# Patient Record
Sex: FEMALE | Race: WHITE | NOT HISPANIC OR LATINO | Employment: UNEMPLOYED | URBAN - METROPOLITAN AREA
[De-identification: names, ages, dates, MRNs, and addresses within clinical notes are randomized per-mention and may not be internally consistent; named-entity substitution may affect disease eponyms.]

---

## 2023-11-01 PROBLEM — G40.909 SEIZURE DISORDER (MULTI): Status: ACTIVE | Noted: 2023-11-01

## 2023-11-01 PROBLEM — R73.9 HYPERGLYCEMIA: Status: ACTIVE | Noted: 2023-11-01

## 2023-11-01 PROBLEM — E66.9 OBESITY: Status: ACTIVE | Noted: 2023-11-01

## 2023-11-01 PROBLEM — L23.9 ALLERGIC CONTACT DERMATITIS: Status: ACTIVE | Noted: 2023-11-01

## 2023-11-01 PROBLEM — E55.9 VITAMIN D DEFICIENCY: Status: ACTIVE | Noted: 2023-11-01

## 2023-11-01 PROBLEM — E03.9 HYPOTHYROIDISM: Status: ACTIVE | Noted: 2023-11-01

## 2023-11-01 PROBLEM — K21.9 ACID REFLUX: Status: ACTIVE | Noted: 2023-11-01

## 2023-11-01 PROBLEM — G43.909 MIGRAINE, UNSPECIFIED, NOT INTRACTABLE, WITHOUT STATUS MIGRAINOSUS: Status: ACTIVE | Noted: 2023-11-01

## 2023-11-01 PROBLEM — E78.5 HYPERLIPIDEMIA: Status: ACTIVE | Noted: 2023-11-01

## 2023-11-01 PROBLEM — R20.2 PARESTHESIAS: Status: ACTIVE | Noted: 2023-11-01

## 2023-11-01 PROBLEM — R53.83 FATIGUE: Status: ACTIVE | Noted: 2023-11-01

## 2023-11-01 PROBLEM — G62.9 PERIPHERAL NEUROPATHY: Status: ACTIVE | Noted: 2023-11-01

## 2023-11-01 PROBLEM — F39 MOOD DISORDER (CMS-HCC): Status: ACTIVE | Noted: 2023-11-01

## 2023-11-01 RX ORDER — METHYLPREDNISOLONE 4 MG/1
TABLET ORAL
COMMUNITY
Start: 2022-06-30 | End: 2024-01-30 | Stop reason: WASHOUT

## 2023-11-01 RX ORDER — PANTOPRAZOLE SODIUM 40 MG/1
40 TABLET, DELAYED RELEASE ORAL DAILY
COMMUNITY
End: 2024-04-11

## 2023-11-01 RX ORDER — SERTRALINE HYDROCHLORIDE 50 MG/1
50 TABLET, FILM COATED ORAL DAILY
COMMUNITY
Start: 2023-04-12

## 2023-11-01 RX ORDER — ATORVASTATIN CALCIUM 20 MG/1
TABLET, FILM COATED ORAL
COMMUNITY
Start: 2023-07-28 | End: 2024-01-30 | Stop reason: WASHOUT

## 2023-11-01 RX ORDER — LEVOTHYROXINE SODIUM 175 UG/1
1 TABLET ORAL DAILY
COMMUNITY
Start: 2021-11-16 | End: 2024-03-01

## 2023-11-01 RX ORDER — TRIAMCINOLONE ACETONIDE 1 MG/G
CREAM TOPICAL
COMMUNITY
Start: 2022-06-13 | End: 2024-01-30 | Stop reason: WASHOUT

## 2023-11-01 RX ORDER — LAMOTRIGINE 200 MG/1
1 TABLET ORAL 2 TIMES DAILY
Status: ON HOLD | COMMUNITY
Start: 2023-10-22 | End: 2024-04-25

## 2023-11-01 RX ORDER — ALBUTEROL SULFATE 90 UG/1
AEROSOL, METERED RESPIRATORY (INHALATION)
COMMUNITY
Start: 2023-01-15 | End: 2024-06-10 | Stop reason: HOSPADM

## 2023-11-01 RX ORDER — VALACYCLOVIR HYDROCHLORIDE 1 G/1
1000 TABLET, FILM COATED ORAL DAILY
COMMUNITY
End: 2024-01-30 | Stop reason: WASHOUT

## 2023-11-01 RX ORDER — ERGOCALCIFEROL 1.25 MG/1
1 CAPSULE ORAL
Status: ON HOLD | COMMUNITY
Start: 2022-04-19 | End: 2024-04-24 | Stop reason: ALTCHOICE

## 2023-11-01 RX ORDER — FLUOXETINE HYDROCHLORIDE 20 MG/1
1 CAPSULE ORAL DAILY
COMMUNITY
Start: 2022-01-31 | End: 2024-01-30 | Stop reason: WASHOUT

## 2023-11-01 RX ORDER — ATORVASTATIN CALCIUM 10 MG/1
1 TABLET, FILM COATED ORAL DAILY
COMMUNITY
Start: 2022-01-25 | End: 2024-03-25 | Stop reason: WASHOUT

## 2023-11-01 RX ORDER — FLUTICASONE PROPIONATE AND SALMETEROL 50; 250 UG/1; UG/1
1 POWDER RESPIRATORY (INHALATION) 2 TIMES DAILY
COMMUNITY
Start: 2023-02-11 | End: 2024-01-30 | Stop reason: WASHOUT

## 2023-11-01 RX ORDER — CEFUROXIME AXETIL 250 MG/1
250 TABLET ORAL EVERY 12 HOURS
COMMUNITY
Start: 2023-05-24 | End: 2024-01-30 | Stop reason: WASHOUT

## 2024-01-02 ENCOUNTER — DOCUMENTATION (OUTPATIENT)
Dept: PRIMARY CARE | Facility: CLINIC | Age: 59
End: 2024-01-02
Payer: COMMERCIAL

## 2024-01-03 ENCOUNTER — PATIENT OUTREACH (OUTPATIENT)
Dept: PRIMARY CARE | Facility: CLINIC | Age: 59
End: 2024-01-03
Payer: COMMERCIAL

## 2024-01-03 NOTE — PROGRESS NOTES
Discharge Facility:  [place]  Discharge Diagnosis: Community acquired pneumonia of left lower lobe of lung   Admission Date: 12/26/2023  Discharge Date: 12/29/2023    PCP Appointment Date: none- task office  Specialist Appointment Date: neurology 1/30/2024  Hospital Encounter and Summary: Linked       START taking these medications     levETIRAcetam 500 mg tablet  Commonly known as: KEPPRA  Take 1 tablet by mouth two times a day.     Unsuccessful attempts x 2 to reach patient for PCP follow up.

## 2024-01-12 ENCOUNTER — PATIENT OUTREACH (OUTPATIENT)
Dept: PRIMARY CARE | Facility: CLINIC | Age: 59
End: 2024-01-12
Payer: COMMERCIAL

## 2024-01-30 ENCOUNTER — OFFICE VISIT (OUTPATIENT)
Dept: NEUROLOGY | Facility: CLINIC | Age: 59
End: 2024-01-30
Payer: MEDICARE

## 2024-01-30 VITALS
DIASTOLIC BLOOD PRESSURE: 70 MMHG | HEART RATE: 66 BPM | BODY MASS INDEX: 35.56 KG/M2 | SYSTOLIC BLOOD PRESSURE: 112 MMHG | WEIGHT: 200.7 LBS | TEMPERATURE: 97.3 F | HEIGHT: 63 IN

## 2024-01-30 DIAGNOSIS — G40.109 TEMPORAL LOBE EPILEPSY (MULTI): Primary | ICD-10-CM

## 2024-01-30 PROCEDURE — 1036F TOBACCO NON-USER: CPT | Performed by: PSYCHIATRY & NEUROLOGY

## 2024-01-30 PROCEDURE — 99214 OFFICE O/P EST MOD 30 MIN: CPT | Performed by: PSYCHIATRY & NEUROLOGY

## 2024-01-30 RX ORDER — LEVETIRACETAM 500 MG/1
500 TABLET ORAL 2 TIMES DAILY
COMMUNITY
Start: 2023-12-29 | End: 2024-03-25 | Stop reason: SINTOL

## 2024-01-30 ASSESSMENT — PATIENT HEALTH QUESTIONNAIRE - PHQ9
2. FEELING DOWN, DEPRESSED OR HOPELESS: NEARLY EVERY DAY
1. LITTLE INTEREST OR PLEASURE IN DOING THINGS: NOT AT ALL
SUM OF ALL RESPONSES TO PHQ9 QUESTIONS 1 & 2: 3
10. IF YOU CHECKED OFF ANY PROBLEMS, HOW DIFFICULT HAVE THESE PROBLEMS MADE IT FOR YOU TO DO YOUR WORK, TAKE CARE OF THINGS AT HOME, OR GET ALONG WITH OTHER PEOPLE: VERY DIFFICULT

## 2024-01-30 ASSESSMENT — LIFESTYLE VARIABLES
HOW OFTEN DO YOU HAVE A DRINK CONTAINING ALCOHOL: NEVER
HOW MANY STANDARD DRINKS CONTAINING ALCOHOL DO YOU HAVE ON A TYPICAL DAY: PATIENT DOES NOT DRINK

## 2024-01-30 NOTE — PROGRESS NOTES
Chief Complaint: Epilepsy    HPI  58 y.o. female presenting for follow up regarding epilepsy.    Since the last visit, she developed focal seizures again.  Around Kali time, she developed episodes of staring off and has concurrent hand automatisms.  It lasts a couple of minutes.  Following the episodes, she is irritable and confused.  Prior to Kali time, her  reports that she was having staring spells once in a while.  She was taken to [facility].  Apparently, she had a continuous EEG overnight from1 2/28 to 12/29.  Keppra was added.  She is currently on Keppra 500 mg BID and Lamotrigine 200 mg BID.  Her  feels like the episodes have lessened.  She remains aphasic.  She also is very tearful.              Current Outpatient Medications:     albuterol 90 mcg/actuation inhaler, INHALE 1 PUFF EVERY 4 HOURS AS NEEDED FOR COUGH/WHEEZE, Disp: , Rfl:     atorvastatin (Lipitor) 10 mg tablet, Take 1 tablet (10 mg) by mouth once daily., Disp: , Rfl:     atorvastatin (Lipitor) 20 mg tablet, TAKE 1 TABLET DAILY, Disp: 90 tablet, Rfl: 3    ergocalciferol (Vitamin D-2) 1.25 MG (57822 UT) capsule, Take 1 capsule (1,250 mcg) by mouth 1 (one) time per week., Disp: , Rfl:     lamoTRIgine (LaMICtal) 200 mg tablet, Take 1 tablet (200 mg) by mouth 2 times a day., Disp: , Rfl:     levETIRAcetam (Keppra) 500 mg tablet, Take 1 tablet (500 mg) by mouth twice a day., Disp: , Rfl:     levothyroxine (Synthroid, Levoxyl) 175 mcg tablet, Take 1 tablet (175 mcg) by mouth once daily., Disp: , Rfl:     pantoprazole (ProtoNix) 40 mg EC tablet, Take 1 tablet (40 mg) by mouth once daily., Disp: , Rfl:     sertraline (Zoloft) 50 mg tablet, Take 1 tablet (50 mg) by mouth once daily., Disp: , Rfl:       Objective:  Gen: NAD  Neuro:  --HIF: Awake, alert, tearful; able to say yes/no answers, unable to name or repeat objects  --CN:  PERRLA, EOM grossly intact, VFF intact to threat, no visible facial asymmetry, facial sensation  intact, no tongue or palatal deviation, SCM intact  --Motor: Moves all 4 extremities equally; no focal deficits  --Sensory: Intact to light touch  --Reflex: 2+ symmetric, toes down  --Cerebellum: Unable to follow commans  --Gait: Normal, narrow based gait    Relevant Results  EEG  Moderate encephalopathy noted    Assessment:   This is a 58 year old female with temporal lobe epilepsy s/p epilepsy surgery presenting for follow up.  She receives her care at [facility].  Since the last visit, her family has noted increasing focal seizures.  She has events where she stares off and has automatisms in her hands (she will start rubbing her thighs) - lasting a few minutes.  She was seen at [facility] - Keppra was added.    Her case is rather complex.  Six months following her epilepsy surgery, she developed aphasia.  She had a workup at [facility] and it was thought that her aphasia may be functional (due to depression).      Her family is here today asking for a second opinion.    Plan:  - recommend continuing Keppra 500 mg BID and Lamotrigine 200 mg BID  - recommend second opinion with Epilepsy subspecialist at   - consider EMU monitoring    Kwaku Cleaning MD  [facility]  Department of Neurology

## 2024-02-05 ENCOUNTER — APPOINTMENT (OUTPATIENT)
Dept: PSYCHOLOGY | Facility: CLINIC | Age: 59
End: 2024-02-05
Payer: COMMERCIAL

## 2024-02-09 ENCOUNTER — PATIENT OUTREACH (OUTPATIENT)
Dept: PRIMARY CARE | Facility: CLINIC | Age: 59
End: 2024-02-09
Payer: MEDICARE

## 2024-03-01 DIAGNOSIS — E03.9 HYPOTHYROIDISM, UNSPECIFIED TYPE: Primary | ICD-10-CM

## 2024-03-01 RX ORDER — LEVOTHYROXINE SODIUM 175 UG/1
175 TABLET ORAL DAILY
Qty: 90 TABLET | Refills: 0 | Status: SHIPPED | OUTPATIENT
Start: 2024-03-01 | End: 2024-03-25 | Stop reason: SDUPTHER

## 2024-03-22 PROBLEM — R56.9 SEIZURE (MULTI): Status: ACTIVE | Noted: 2021-04-27

## 2024-03-22 PROBLEM — G40.219: Status: ACTIVE | Noted: 2020-10-02

## 2024-03-22 PROBLEM — B00.1 HERPES LABIALIS: Status: ACTIVE | Noted: 2024-03-22

## 2024-03-22 PROBLEM — G40.109 TEMPORAL LOBE EPILEPSY (MULTI): Status: ACTIVE | Noted: 2019-03-28

## 2024-03-22 PROBLEM — G47.30 SLEEP APNEA: Status: ACTIVE | Noted: 2021-04-30

## 2024-03-22 PROBLEM — R41.82 MENTAL STATUS, DECREASED: Status: ACTIVE | Noted: 2023-12-28

## 2024-03-22 PROBLEM — F33.9 RECURRENT MAJOR DEPRESSION (CMS-HCC): Status: ACTIVE | Noted: 2020-10-05

## 2024-03-22 PROBLEM — R41.3 MEMORY IMPAIRMENT: Status: ACTIVE | Noted: 2024-03-22

## 2024-03-22 PROBLEM — G40.119: Status: ACTIVE | Noted: 2021-09-20

## 2024-03-22 PROBLEM — R47.01 APHASIA: Status: ACTIVE | Noted: 2020-11-03

## 2024-03-22 PROBLEM — J45.909 ASTHMA (HHS-HCC): Status: ACTIVE | Noted: 2024-03-22

## 2024-03-22 PROBLEM — G40.919 BREAKTHROUGH SEIZURE (MULTI): Status: ACTIVE | Noted: 2022-03-01

## 2024-03-22 PROBLEM — R40.4 STARING EPISODES: Status: ACTIVE | Noted: 2023-12-27

## 2024-03-22 PROBLEM — G40.209 PARTIAL EPILEPSY WITH IMPAIRMENT OF CONSCIOUSNESS (MULTI): Status: ACTIVE | Noted: 2020-10-02

## 2024-03-22 PROBLEM — G40.909 EPILEPSY (MULTI): Status: ACTIVE | Noted: 2022-03-18

## 2024-03-22 PROBLEM — R21 RASH: Status: ACTIVE | Noted: 2024-03-22

## 2024-03-22 PROBLEM — J06.9 ACUTE UPPER RESPIRATORY INFECTION: Status: ACTIVE | Noted: 2024-03-22

## 2024-03-22 PROBLEM — R79.89: Status: ACTIVE | Noted: 2022-03-11

## 2024-03-22 PROBLEM — J18.9 COMMUNITY ACQUIRED PNEUMONIA OF LEFT LOWER LOBE OF LUNG: Status: ACTIVE | Noted: 2023-12-27

## 2024-03-22 PROBLEM — E78.49 OTHER HYPERLIPIDEMIA: Status: ACTIVE | Noted: 2020-10-05

## 2024-03-22 PROBLEM — F17.200 NICOTINE DEPENDENCE: Status: ACTIVE | Noted: 2020-09-05

## 2024-03-22 PROBLEM — F32.A DEPRESSIVE DISORDER: Status: ACTIVE | Noted: 2022-03-11

## 2024-03-22 PROBLEM — R26.89 BALANCE PROBLEMS: Status: ACTIVE | Noted: 2022-03-23

## 2024-03-22 PROBLEM — M79.602 PAIN OF LEFT UPPER EXTREMITY: Status: ACTIVE | Noted: 2022-02-14

## 2024-03-22 PROBLEM — E66.01 SEVERE OBESITY (MULTI): Status: ACTIVE | Noted: 2020-09-05

## 2024-03-22 PROBLEM — G93.41 METABOLIC ENCEPHALOPATHY: Status: ACTIVE | Noted: 2022-03-11

## 2024-03-25 ENCOUNTER — OFFICE VISIT (OUTPATIENT)
Dept: PRIMARY CARE | Facility: CLINIC | Age: 59
End: 2024-03-25
Payer: MEDICARE

## 2024-03-25 VITALS
DIASTOLIC BLOOD PRESSURE: 72 MMHG | OXYGEN SATURATION: 95 % | HEIGHT: 63 IN | BODY MASS INDEX: 35.26 KG/M2 | HEART RATE: 76 BPM | WEIGHT: 199 LBS | TEMPERATURE: 96.5 F | SYSTOLIC BLOOD PRESSURE: 114 MMHG

## 2024-03-25 DIAGNOSIS — R21 RASH: ICD-10-CM

## 2024-03-25 DIAGNOSIS — E03.9 HYPOTHYROIDISM, UNSPECIFIED TYPE: ICD-10-CM

## 2024-03-25 DIAGNOSIS — E78.5 HYPERLIPIDEMIA, UNSPECIFIED HYPERLIPIDEMIA TYPE: Primary | ICD-10-CM

## 2024-03-25 PROCEDURE — 99214 OFFICE O/P EST MOD 30 MIN: CPT | Performed by: FAMILY MEDICINE

## 2024-03-25 PROCEDURE — 1036F TOBACCO NON-USER: CPT | Performed by: FAMILY MEDICINE

## 2024-03-25 RX ORDER — LEVOTHYROXINE SODIUM 175 UG/1
175 TABLET ORAL DAILY
Qty: 90 TABLET | Refills: 3 | Status: SHIPPED | OUTPATIENT
Start: 2024-03-25 | End: 2024-06-10 | Stop reason: HOSPADM

## 2024-03-25 RX ORDER — TRIAMCINOLONE ACETONIDE 1 MG/G
CREAM TOPICAL 2 TIMES DAILY
Qty: 30 G | Refills: 0 | Status: SHIPPED | OUTPATIENT
Start: 2024-03-25 | End: 2024-04-11

## 2024-03-25 RX ORDER — ATORVASTATIN CALCIUM 20 MG/1
20 TABLET, FILM COATED ORAL DAILY
Qty: 90 TABLET | Refills: 3 | Status: SHIPPED | OUTPATIENT
Start: 2024-03-25

## 2024-03-25 ASSESSMENT — PATIENT HEALTH QUESTIONNAIRE - PHQ9
1. LITTLE INTEREST OR PLEASURE IN DOING THINGS: NOT AT ALL
2. FEELING DOWN, DEPRESSED OR HOPELESS: NOT AT ALL
SUM OF ALL RESPONSES TO PHQ9 QUESTIONS 1 AND 2: 0

## 2024-03-25 NOTE — PROGRESS NOTES
Fuv   Med refills   Eval medication    HPI patient with a history of hyperlipidemia hypothyroidism questing refills of medication.  She is scheduled to see an epilepsy subspecialist next month.  She remains on Lamictal.  She was given Keppra as well but discontinued the Keppra due to side effects.   notes she became very angry.  Patient will need refills for her Lipitor and Synthroid.  She notes she has a rash on the back notes it is itchy.  Denies fever chills chest pain.  Past medical social surgical history is reviewed    Exam calm psych pleasant female no psychosis.  Skin skin exam reveals a very small pinpoint rash on the low back area in the waistline no evidence of infection no blisters or pustules noted    A/P 1.  Hyperlipidemia stable 2 hypothyroidism stable 3 rash.  Triamcinolone cream ordered.  Medicines are refilled.  She will follow-up with neurology as previously arranged.   notes she also has an appointment with psychiatry at the [facility].

## 2024-04-23 ENCOUNTER — APPOINTMENT (OUTPATIENT)
Dept: RADIOLOGY | Facility: HOSPITAL | Age: 59
DRG: 177 | End: 2024-04-23
Payer: MEDICARE

## 2024-04-23 ENCOUNTER — HOSPITAL ENCOUNTER (INPATIENT)
Facility: HOSPITAL | Age: 59
LOS: 2 days | Discharge: HOME | DRG: 177 | End: 2024-04-25
Attending: STUDENT IN AN ORGANIZED HEALTH CARE EDUCATION/TRAINING PROGRAM | Admitting: STUDENT IN AN ORGANIZED HEALTH CARE EDUCATION/TRAINING PROGRAM
Payer: MEDICARE

## 2024-04-23 ENCOUNTER — APPOINTMENT (OUTPATIENT)
Dept: CARDIOLOGY | Facility: HOSPITAL | Age: 59
DRG: 177 | End: 2024-04-23
Payer: MEDICARE

## 2024-04-23 DIAGNOSIS — J18.9 PNEUMONIA OF RIGHT LUNG DUE TO INFECTIOUS ORGANISM, UNSPECIFIED PART OF LUNG: ICD-10-CM

## 2024-04-23 DIAGNOSIS — A49.9 BACTERIAL UTI: ICD-10-CM

## 2024-04-23 DIAGNOSIS — R41.82 ALTERED MENTAL STATUS, UNSPECIFIED ALTERED MENTAL STATUS TYPE: Primary | ICD-10-CM

## 2024-04-23 DIAGNOSIS — J18.9 COMMUNITY ACQUIRED PNEUMONIA OF LEFT LOWER LOBE OF LUNG: ICD-10-CM

## 2024-04-23 DIAGNOSIS — R56.9 SEIZURE (MULTI): ICD-10-CM

## 2024-04-23 DIAGNOSIS — E66.9 OBESITY, UNSPECIFIED: ICD-10-CM

## 2024-04-23 DIAGNOSIS — G40.109 TEMPORAL LOBE EPILEPSY (MULTI): ICD-10-CM

## 2024-04-23 DIAGNOSIS — N39.0 BACTERIAL UTI: ICD-10-CM

## 2024-04-23 LAB
ALBUMIN SERPL BCP-MCNC: 4.5 G/DL (ref 3.4–5)
ALP SERPL-CCNC: 78 U/L (ref 33–110)
ALT SERPL W P-5'-P-CCNC: 7 U/L (ref 7–45)
ANION GAP SERPL CALC-SCNC: 15 MMOL/L (ref 10–20)
APPEARANCE UR: ABNORMAL
AST SERPL W P-5'-P-CCNC: 17 U/L (ref 9–39)
BACTERIA #/AREA URNS AUTO: ABNORMAL /HPF
BASOPHILS # BLD AUTO: 0.05 X10*3/UL (ref 0–0.1)
BASOPHILS NFR BLD AUTO: 0.3 %
BILIRUB SERPL-MCNC: 0.6 MG/DL (ref 0–1.2)
BILIRUB UR STRIP.AUTO-MCNC: NEGATIVE MG/DL
BUN SERPL-MCNC: 16 MG/DL (ref 6–23)
CALCIUM SERPL-MCNC: 9.8 MG/DL (ref 8.6–10.3)
CAOX CRY #/AREA UR COMP ASSIST: ABNORMAL /HPF
CHLORIDE SERPL-SCNC: 104 MMOL/L (ref 98–107)
CO2 SERPL-SCNC: 24 MMOL/L (ref 21–32)
COLOR UR: YELLOW
CREAT SERPL-MCNC: 1.06 MG/DL (ref 0.5–1.05)
EGFRCR SERPLBLD CKD-EPI 2021: 61 ML/MIN/1.73M*2
EOSINOPHIL # BLD AUTO: 0.06 X10*3/UL (ref 0–0.7)
EOSINOPHIL NFR BLD AUTO: 0.4 %
ERYTHROCYTE [DISTWIDTH] IN BLOOD BY AUTOMATED COUNT: 13.5 % (ref 11.5–14.5)
FLUAV RNA RESP QL NAA+PROBE: NOT DETECTED
FLUBV RNA RESP QL NAA+PROBE: NOT DETECTED
GLUCOSE SERPL-MCNC: 100 MG/DL (ref 74–99)
GLUCOSE UR STRIP.AUTO-MCNC: NEGATIVE MG/DL
HCT VFR BLD AUTO: 41.9 % (ref 36–46)
HGB BLD-MCNC: 13.6 G/DL (ref 12–16)
IMM GRANULOCYTES # BLD AUTO: 0.04 X10*3/UL (ref 0–0.7)
IMM GRANULOCYTES NFR BLD AUTO: 0.3 % (ref 0–0.9)
KETONES UR STRIP.AUTO-MCNC: NEGATIVE MG/DL
LEUKOCYTE ESTERASE UR QL STRIP.AUTO: ABNORMAL
LYMPHOCYTES # BLD AUTO: 1.46 X10*3/UL (ref 1.2–4.8)
LYMPHOCYTES NFR BLD AUTO: 9.7 %
MAGNESIUM SERPL-MCNC: 2.11 MG/DL (ref 1.6–2.4)
MCH RBC QN AUTO: 27.7 PG (ref 26–34)
MCHC RBC AUTO-ENTMCNC: 32.5 G/DL (ref 32–36)
MCV RBC AUTO: 85 FL (ref 80–100)
MONOCYTES # BLD AUTO: 0.61 X10*3/UL (ref 0.1–1)
MONOCYTES NFR BLD AUTO: 4 %
MUCOUS THREADS #/AREA URNS AUTO: ABNORMAL /LPF
NEUTROPHILS # BLD AUTO: 12.85 X10*3/UL (ref 1.2–7.7)
NEUTROPHILS NFR BLD AUTO: 85.3 %
NITRITE UR QL STRIP.AUTO: NEGATIVE
NRBC BLD-RTO: 0 /100 WBCS (ref 0–0)
PH UR STRIP.AUTO: 6 [PH]
PLATELET # BLD AUTO: 204 X10*3/UL (ref 150–450)
POTASSIUM SERPL-SCNC: 4.3 MMOL/L (ref 3.5–5.3)
PROT SERPL-MCNC: 7.5 G/DL (ref 6.4–8.2)
PROT UR STRIP.AUTO-MCNC: ABNORMAL MG/DL
RBC # BLD AUTO: 4.91 X10*6/UL (ref 4–5.2)
RBC # UR STRIP.AUTO: ABNORMAL /UL
RBC #/AREA URNS AUTO: ABNORMAL /HPF
SARS-COV-2 RNA RESP QL NAA+PROBE: NOT DETECTED
SODIUM SERPL-SCNC: 139 MMOL/L (ref 136–145)
SP GR UR STRIP.AUTO: 1.03
SQUAMOUS #/AREA URNS AUTO: ABNORMAL /HPF
UROBILINOGEN UR STRIP.AUTO-MCNC: 4 MG/DL
WBC # BLD AUTO: 15.1 X10*3/UL (ref 4.4–11.3)
WBC #/AREA URNS AUTO: ABNORMAL /HPF

## 2024-04-23 PROCEDURE — 87086 URINE CULTURE/COLONY COUNT: CPT | Mod: PARLAB | Performed by: NURSE PRACTITIONER

## 2024-04-23 PROCEDURE — 96366 THER/PROPH/DIAG IV INF ADDON: CPT

## 2024-04-23 PROCEDURE — G0378 HOSPITAL OBSERVATION PER HR: HCPCS

## 2024-04-23 PROCEDURE — 1210000001 HC SEMI-PRIVATE ROOM DAILY

## 2024-04-23 PROCEDURE — 93005 ELECTROCARDIOGRAM TRACING: CPT

## 2024-04-23 PROCEDURE — 36415 COLL VENOUS BLD VENIPUNCTURE: CPT | Performed by: NURSE PRACTITIONER

## 2024-04-23 PROCEDURE — 83735 ASSAY OF MAGNESIUM: CPT | Performed by: NURSE PRACTITIONER

## 2024-04-23 PROCEDURE — 87636 SARSCOV2 & INF A&B AMP PRB: CPT | Performed by: NURSE PRACTITIONER

## 2024-04-23 PROCEDURE — 71046 X-RAY EXAM CHEST 2 VIEWS: CPT | Performed by: RADIOLOGY

## 2024-04-23 PROCEDURE — 2500000004 HC RX 250 GENERAL PHARMACY W/ HCPCS (ALT 636 FOR OP/ED): Performed by: STUDENT IN AN ORGANIZED HEALTH CARE EDUCATION/TRAINING PROGRAM

## 2024-04-23 PROCEDURE — 85025 COMPLETE CBC W/AUTO DIFF WBC: CPT | Performed by: NURSE PRACTITIONER

## 2024-04-23 PROCEDURE — 80053 COMPREHEN METABOLIC PANEL: CPT | Performed by: NURSE PRACTITIONER

## 2024-04-23 PROCEDURE — 99285 EMERGENCY DEPT VISIT HI MDM: CPT | Mod: 25

## 2024-04-23 PROCEDURE — 70450 CT HEAD/BRAIN W/O DYE: CPT | Performed by: RADIOLOGY

## 2024-04-23 PROCEDURE — 96365 THER/PROPH/DIAG IV INF INIT: CPT

## 2024-04-23 PROCEDURE — 71046 X-RAY EXAM CHEST 2 VIEWS: CPT

## 2024-04-23 PROCEDURE — 81001 URINALYSIS AUTO W/SCOPE: CPT | Performed by: NURSE PRACTITIONER

## 2024-04-23 PROCEDURE — 70450 CT HEAD/BRAIN W/O DYE: CPT

## 2024-04-23 RX ORDER — LEVOFLOXACIN 5 MG/ML
750 INJECTION, SOLUTION INTRAVENOUS ONCE
Status: COMPLETED | OUTPATIENT
Start: 2024-04-23 | End: 2024-04-23

## 2024-04-23 RX ORDER — DOXYCYCLINE HYCLATE 50 MG/1
100 CAPSULE ORAL ONCE
Status: DISCONTINUED | OUTPATIENT
Start: 2024-04-23 | End: 2024-04-23

## 2024-04-23 RX ADMIN — LEVOFLOXACIN 750 MG: 750 INJECTION, SOLUTION INTRAVENOUS at 21:44

## 2024-04-23 ASSESSMENT — COLUMBIA-SUICIDE SEVERITY RATING SCALE - C-SSRS
2. HAVE YOU ACTUALLY HAD ANY THOUGHTS OF KILLING YOURSELF?: NO
6. HAVE YOU EVER DONE ANYTHING, STARTED TO DO ANYTHING, OR PREPARED TO DO ANYTHING TO END YOUR LIFE?: NO
6. HAVE YOU EVER DONE ANYTHING, STARTED TO DO ANYTHING, OR PREPARED TO DO ANYTHING TO END YOUR LIFE?: NO
1. IN THE PAST MONTH, HAVE YOU WISHED YOU WERE DEAD OR WISHED YOU COULD GO TO SLEEP AND NOT WAKE UP?: NO

## 2024-04-23 ASSESSMENT — LIFESTYLE VARIABLES
HAVE YOU EVER FELT YOU SHOULD CUT DOWN ON YOUR DRINKING: NO
EVER HAD A DRINK FIRST THING IN THE MORNING TO STEADY YOUR NERVES TO GET RID OF A HANGOVER: NO
HAVE PEOPLE ANNOYED YOU BY CRITICIZING YOUR DRINKING: NO
TOTAL SCORE: 0
EVER FELT BAD OR GUILTY ABOUT YOUR DRINKING: NO

## 2024-04-23 ASSESSMENT — PAIN SCALES - GENERAL
PAINLEVEL_OUTOF10: 0 - NO PAIN
PAINLEVEL_OUTOF10: 0 - NO PAIN

## 2024-04-23 ASSESSMENT — PAIN - FUNCTIONAL ASSESSMENT: PAIN_FUNCTIONAL_ASSESSMENT: 0-10

## 2024-04-23 NOTE — ED TRIAGE NOTES
" TRIAGE NOTE   I saw the patient as the Clinician in Triage and performed a brief history and physical exam, established acuity, and ordered appropriate tests to develop basic plan of care. Patient will be seen by an [facility]ENNI, resident and/or physician who will independently evaluate the patient. Please see subsequent provider notes for further details and disposition.     Brief HPI: In brief, Elana Cox is a 58 y.o. female with significant past medical history for hypothyroidism, COPD and epilepsy presenting to ED today from home with  for evaluation of mental status change.  Patient is unable to answer any questions, she is able to state her name but she cannot answer anything else.  Follows few simple commands.   states in \"2021 the patient had brain surgery for focal seizures.\"   feels the patient has had decreased mental status for years since the surgery.  The patient is unable to follow directions, she hallucinates and is more confused.  Friend who is anesthesiologist dated \"the [facility] is not doing anything for her, take her somewhere else.\"  Reports cough over the last couple days.   wants another opinion.   denies fever/chills, sore throat, chest pain, shortness of breath, nausea/vomiting, abdominal pain, urinary symptoms, change in bowel habits or any other complaints.  No smoking, EtOH or drug use.  PCP is Dr. Olivares.  Neurologist at [facility].    Focused Physical exam:   General: Older  female, sitting quietly in exam room, responds to name and follows a few simple commands.  Awake and alert.  Well-nourished and hydrated.  Skin: Pink warm and dry.  Cardiac: Regular rate and rhythm.  Pulmonary, lungs clear bilaterally.  Abdomen: Soft and rounded.  Bowel sounds x 4.  Extremities: Movement of extremities x 4.  MSPs intact.  Skin intact.  No deformities.  Neuro: Awake and alert, intermittently mumbling, responds to name.  Moves extremities bilaterally and " symmetrically.    Plan/MDM:   58-year-old female with history of hypothyroidism, COPD and epilepsy is evaluated at the bedside in the ER for altered mental status.   states she has had altered mental status since brain surgery 3 years ago, she hallucinates at times she cannot follow directions and she is more confused.  Does report a mild cough.  A friend encouraged him to come for second opinion as he does not feel the bleeding clinic is doing anything for her.  On arrival to the ED, vital signs within normal limits.  Afebrile.  Awake and alert, responding to name and follows a few simple commands.  Basic metabolic workup will be obtained including labs, UA/urine culture, chest x-ray, viral swabs, EKG and head CT in preparation for further evaluation in the main ED.  Please see subsequent provider note for further details and disposition

## 2024-04-24 LAB
ALBUMIN SERPL BCP-MCNC: 4.2 G/DL (ref 3.4–5)
ANION GAP SERPL CALC-SCNC: 14 MMOL/L (ref 10–20)
ATRIAL RATE: 72 BPM
BACTERIA UR CULT: NORMAL
BUN SERPL-MCNC: 11 MG/DL (ref 6–23)
CALCIUM SERPL-MCNC: 9.2 MG/DL (ref 8.6–10.3)
CHLORIDE SERPL-SCNC: 105 MMOL/L (ref 98–107)
CO2 SERPL-SCNC: 23 MMOL/L (ref 21–32)
CREAT SERPL-MCNC: 0.82 MG/DL (ref 0.5–1.05)
EGFRCR SERPLBLD CKD-EPI 2021: 83 ML/MIN/1.73M*2
ERYTHROCYTE [DISTWIDTH] IN BLOOD BY AUTOMATED COUNT: 13.5 % (ref 11.5–14.5)
GLUCOSE SERPL-MCNC: 95 MG/DL (ref 74–99)
HCT VFR BLD AUTO: 38 % (ref 36–46)
HGB BLD-MCNC: 12.6 G/DL (ref 12–16)
HOLD SPECIMEN: NORMAL
LEVETIRACETAM SERPL-MCNC: <2 UG/ML (ref 10–40)
MCH RBC QN AUTO: 27.8 PG (ref 26–34)
MCHC RBC AUTO-ENTMCNC: 33.2 G/DL (ref 32–36)
MCV RBC AUTO: 84 FL (ref 80–100)
NRBC BLD-RTO: 0 /100 WBCS (ref 0–0)
P AXIS: 50 DEGREES
PHOSPHATE SERPL-MCNC: 4.1 MG/DL (ref 2.5–4.9)
PLATELET # BLD AUTO: 241 X10*3/UL (ref 150–450)
POTASSIUM SERPL-SCNC: 3.7 MMOL/L (ref 3.5–5.3)
PR INTERVAL: 182 MS
Q ONSET: 251 MS
QRS COUNT: 12 BEATS
QRS DURATION: 114 MS
QT INTERVAL: 399 MS
QTC CALCULATION(BAZETT): 437 MS
QTC FREDERICIA: 424 MS
R AXIS: 21 DEGREES
RBC # BLD AUTO: 4.53 X10*6/UL (ref 4–5.2)
SODIUM SERPL-SCNC: 138 MMOL/L (ref 136–145)
T AXIS: 54 DEGREES
T OFFSET: 450 MS
T3FREE SERPL-MCNC: 2.9 PG/ML (ref 2.3–4.2)
TSH SERPL-ACNC: 0.1 MIU/L (ref 0.44–3.98)
VENTRICULAR RATE: 72 BPM
WBC # BLD AUTO: 10.5 X10*3/UL (ref 4.4–11.3)

## 2024-04-24 PROCEDURE — 84443 ASSAY THYROID STIM HORMONE: CPT

## 2024-04-24 PROCEDURE — G0378 HOSPITAL OBSERVATION PER HR: HCPCS

## 2024-04-24 PROCEDURE — 87449 NOS EACH ORGANISM AG IA: CPT | Mod: PARLAB

## 2024-04-24 PROCEDURE — 2500000006 HC RX 250 W HCPCS SELF ADMINISTERED DRUGS (ALT 637 FOR ALL PAYERS): Mod: MUE

## 2024-04-24 PROCEDURE — 2500000001 HC RX 250 WO HCPCS SELF ADMINISTERED DRUGS (ALT 637 FOR MEDICARE OP)

## 2024-04-24 PROCEDURE — 36415 COLL VENOUS BLD VENIPUNCTURE: CPT

## 2024-04-24 PROCEDURE — 85027 COMPLETE CBC AUTOMATED: CPT

## 2024-04-24 PROCEDURE — 2500000004 HC RX 250 GENERAL PHARMACY W/ HCPCS (ALT 636 FOR OP/ED)

## 2024-04-24 PROCEDURE — 80069 RENAL FUNCTION PANEL: CPT

## 2024-04-24 PROCEDURE — 2500000005 HC RX 250 GENERAL PHARMACY W/O HCPCS

## 2024-04-24 PROCEDURE — 84481 FREE ASSAY (FT-3): CPT | Mod: PARLAB

## 2024-04-24 PROCEDURE — 2500000006 HC RX 250 W HCPCS SELF ADMINISTERED DRUGS (ALT 637 FOR ALL PAYERS)

## 2024-04-24 PROCEDURE — 87899 AGENT NOS ASSAY W/OPTIC: CPT | Mod: PARLAB

## 2024-04-24 PROCEDURE — 1200000002 HC GENERAL ROOM WITH TELEMETRY DAILY

## 2024-04-24 PROCEDURE — 86738 MYCOPLASMA ANTIBODY: CPT

## 2024-04-24 PROCEDURE — 80177 DRUG SCRN QUAN LEVETIRACETAM: CPT | Mod: PARLAB

## 2024-04-24 RX ORDER — TRIAMCINOLONE ACETONIDE 1 MG/G
CREAM TOPICAL 2 TIMES DAILY
Status: DISCONTINUED | OUTPATIENT
Start: 2024-04-24 | End: 2024-04-25 | Stop reason: HOSPADM

## 2024-04-24 RX ORDER — IPRATROPIUM BROMIDE AND ALBUTEROL SULFATE 2.5; .5 MG/3ML; MG/3ML
3 SOLUTION RESPIRATORY (INHALATION) EVERY 6 HOURS PRN
Status: DISCONTINUED | OUTPATIENT
Start: 2024-04-24 | End: 2024-04-24

## 2024-04-24 RX ORDER — GUAIFENESIN 600 MG/1
600 TABLET, EXTENDED RELEASE ORAL 2 TIMES DAILY PRN
Status: DISCONTINUED | OUTPATIENT
Start: 2024-04-24 | End: 2024-04-25 | Stop reason: HOSPADM

## 2024-04-24 RX ORDER — ACETAMINOPHEN 160 MG/5ML
650 SOLUTION ORAL EVERY 4 HOURS PRN
Status: DISCONTINUED | OUTPATIENT
Start: 2024-04-24 | End: 2024-04-25 | Stop reason: HOSPADM

## 2024-04-24 RX ORDER — LEVOFLOXACIN 5 MG/ML
750 INJECTION, SOLUTION INTRAVENOUS EVERY 24 HOURS
Status: DISCONTINUED | OUTPATIENT
Start: 2024-04-24 | End: 2024-04-25 | Stop reason: HOSPADM

## 2024-04-24 RX ORDER — ATORVASTATIN CALCIUM 20 MG/1
20 TABLET, FILM COATED ORAL DAILY
Status: DISCONTINUED | OUTPATIENT
Start: 2024-04-24 | End: 2024-04-25 | Stop reason: HOSPADM

## 2024-04-24 RX ORDER — ACETAMINOPHEN 650 MG/1
650 SUPPOSITORY RECTAL EVERY 4 HOURS PRN
Status: DISCONTINUED | OUTPATIENT
Start: 2024-04-24 | End: 2024-04-25 | Stop reason: HOSPADM

## 2024-04-24 RX ORDER — CEFTRIAXONE 1 G/50ML
1 INJECTION, SOLUTION INTRAVENOUS EVERY 24 HOURS
Status: DISCONTINUED | OUTPATIENT
Start: 2024-04-24 | End: 2024-04-24

## 2024-04-24 RX ORDER — ACETAMINOPHEN 325 MG/1
650 TABLET ORAL EVERY 4 HOURS PRN
Status: DISCONTINUED | OUTPATIENT
Start: 2024-04-24 | End: 2024-04-25 | Stop reason: HOSPADM

## 2024-04-24 RX ORDER — TALC
3 POWDER (GRAM) TOPICAL NIGHTLY PRN
Status: DISCONTINUED | OUTPATIENT
Start: 2024-04-24 | End: 2024-04-25 | Stop reason: HOSPADM

## 2024-04-24 RX ORDER — SERTRALINE HYDROCHLORIDE 50 MG/1
50 TABLET, FILM COATED ORAL DAILY
Status: DISCONTINUED | OUTPATIENT
Start: 2024-04-24 | End: 2024-04-25 | Stop reason: HOSPADM

## 2024-04-24 RX ORDER — LEVETIRACETAM 250 MG/1
125 TABLET ORAL 2 TIMES DAILY
COMMUNITY
End: 2024-06-10 | Stop reason: HOSPADM

## 2024-04-24 RX ORDER — LAMOTRIGINE 100 MG/1
200 TABLET ORAL 2 TIMES DAILY
Status: DISCONTINUED | OUTPATIENT
Start: 2024-04-24 | End: 2024-04-25 | Stop reason: HOSPADM

## 2024-04-24 RX ORDER — LEVETIRACETAM 500 MG/1
250 TABLET ORAL 2 TIMES DAILY
Status: CANCELLED | OUTPATIENT
Start: 2024-04-24

## 2024-04-24 RX ORDER — LEVETIRACETAM 500 MG/1
250 TABLET ORAL 2 TIMES DAILY
Status: DISCONTINUED | OUTPATIENT
Start: 2024-04-24 | End: 2024-04-25 | Stop reason: HOSPADM

## 2024-04-24 RX ORDER — ENOXAPARIN SODIUM 100 MG/ML
40 INJECTION SUBCUTANEOUS EVERY 24 HOURS
Status: DISCONTINUED | OUTPATIENT
Start: 2024-04-24 | End: 2024-04-25 | Stop reason: HOSPADM

## 2024-04-24 RX ORDER — POLYETHYLENE GLYCOL 3350 17 G/17G
17 POWDER, FOR SOLUTION ORAL DAILY
Status: DISCONTINUED | OUTPATIENT
Start: 2024-04-24 | End: 2024-04-25 | Stop reason: HOSPADM

## 2024-04-24 RX ORDER — PANTOPRAZOLE SODIUM 40 MG/1
40 TABLET, DELAYED RELEASE ORAL DAILY
Status: DISCONTINUED | OUTPATIENT
Start: 2024-04-24 | End: 2024-04-25 | Stop reason: HOSPADM

## 2024-04-24 RX ORDER — LEVOTHYROXINE SODIUM 175 UG/1
175 TABLET ORAL DAILY
Status: DISCONTINUED | OUTPATIENT
Start: 2024-04-24 | End: 2024-04-25 | Stop reason: HOSPADM

## 2024-04-24 RX ORDER — ERGOCALCIFEROL 1.25 MG/1
1250 CAPSULE ORAL
Status: DISCONTINUED | OUTPATIENT
Start: 2024-04-28 | End: 2024-04-25 | Stop reason: HOSPADM

## 2024-04-24 RX ORDER — ALBUTEROL SULFATE 90 UG/1
1 AEROSOL, METERED RESPIRATORY (INHALATION) EVERY 4 HOURS PRN
Status: DISCONTINUED | OUTPATIENT
Start: 2024-04-24 | End: 2024-04-25 | Stop reason: HOSPADM

## 2024-04-24 RX ADMIN — Medication 2 L/MIN: at 08:40

## 2024-04-24 RX ADMIN — LEVETIRACETAM 250 MG: 500 TABLET, FILM COATED ORAL at 08:39

## 2024-04-24 RX ADMIN — LEVETIRACETAM 250 MG: 500 TABLET, FILM COATED ORAL at 20:40

## 2024-04-24 RX ADMIN — Medication 2 L/MIN: at 20:00

## 2024-04-24 RX ADMIN — SERTRALINE HYDROCHLORIDE 50 MG: 50 TABLET ORAL at 08:38

## 2024-04-24 RX ADMIN — ENOXAPARIN SODIUM 40 MG: 40 INJECTION SUBCUTANEOUS at 02:13

## 2024-04-24 RX ADMIN — PANTOPRAZOLE SODIUM 40 MG: 40 TABLET, DELAYED RELEASE ORAL at 08:39

## 2024-04-24 RX ADMIN — LEVOFLOXACIN 750 MG: 5 INJECTION, SOLUTION INTRAVENOUS at 20:40

## 2024-04-24 RX ADMIN — Medication: at 08:00

## 2024-04-24 RX ADMIN — ATORVASTATIN CALCIUM 20 MG: 20 TABLET, FILM COATED ORAL at 08:38

## 2024-04-24 RX ADMIN — LAMOTRIGINE 200 MG: 100 TABLET ORAL at 02:13

## 2024-04-24 RX ADMIN — Medication: at 04:00

## 2024-04-24 RX ADMIN — LEVOTHYROXINE SODIUM 175 MCG: 175 TABLET ORAL at 06:51

## 2024-04-24 RX ADMIN — LAMOTRIGINE 200 MG: 100 TABLET ORAL at 20:40

## 2024-04-24 RX ADMIN — LAMOTRIGINE 200 MG: 100 TABLET ORAL at 08:39

## 2024-04-24 SDOH — SOCIAL STABILITY: SOCIAL INSECURITY: HAVE YOU HAD THOUGHTS OF HARMING ANYONE ELSE?: YES

## 2024-04-24 SDOH — SOCIAL STABILITY: SOCIAL INSECURITY: WERE YOU ABLE TO COMPLETE ALL THE BEHAVIORAL HEALTH SCREENINGS?: NO

## 2024-04-24 ASSESSMENT — ACTIVITIES OF DAILY LIVING (ADL)
PATIENT'S MEMORY ADEQUATE TO SAFELY COMPLETE DAILY ACTIVITIES?: NO
BATHING: NEEDS ASSISTANCE
HEARING - LEFT EAR: FUNCTIONAL
ADEQUATE_TO_COMPLETE_ADL: YES
TOILETING: NEEDS ASSISTANCE
HEARING - RIGHT EAR: FUNCTIONAL
GROOMING: NEEDS ASSISTANCE
LACK_OF_TRANSPORTATION: PATIENT DECLINED
JUDGMENT_ADEQUATE_SAFELY_COMPLETE_DAILY_ACTIVITIES: NO
DRESSING YOURSELF: NEEDS ASSISTANCE
WALKS IN HOME: INDEPENDENT
FEEDING YOURSELF: NEEDS ASSISTANCE

## 2024-04-24 ASSESSMENT — COGNITIVE AND FUNCTIONAL STATUS - GENERAL
DRESSING REGULAR LOWER BODY CLOTHING: A LOT
MOBILITY SCORE: 19
DAILY ACTIVITIY SCORE: 13
STANDING UP FROM CHAIR USING ARMS: A LITTLE
DAILY ACTIVITIY SCORE: 12
CLIMB 3 TO 5 STEPS WITH RAILING: A LOT
HELP NEEDED FOR BATHING: A LOT
HELP NEEDED FOR BATHING: A LOT
PERSONAL GROOMING: A LOT
MOBILITY SCORE: 24
WALKING IN HOSPITAL ROOM: A LITTLE
MOVING TO AND FROM BED TO CHAIR: A LITTLE
DRESSING REGULAR UPPER BODY CLOTHING: A LOT
TOILETING: A LOT
EATING MEALS: A LITTLE
EATING MEALS: A LOT
DRESSING REGULAR LOWER BODY CLOTHING: A LOT
PERSONAL GROOMING: A LOT
DRESSING REGULAR UPPER BODY CLOTHING: A LOT
TOILETING: A LOT
PATIENT BASELINE BEDBOUND: NO

## 2024-04-24 ASSESSMENT — LIFESTYLE VARIABLES
SKIP TO QUESTIONS 9-10: 1
AUDIT-C TOTAL SCORE: 0
HOW OFTEN DO YOU HAVE A DRINK CONTAINING ALCOHOL: NEVER
SUBSTANCE_ABUSE_PAST_12_MONTHS: NO
AUDIT-C TOTAL SCORE: 0
HOW OFTEN DO YOU HAVE 6 OR MORE DRINKS ON ONE OCCASION: NEVER
HOW MANY STANDARD DRINKS CONTAINING ALCOHOL DO YOU HAVE ON A TYPICAL DAY: PATIENT DOES NOT DRINK
PRESCIPTION_ABUSE_PAST_12_MONTHS: NO

## 2024-04-24 ASSESSMENT — PAIN SCALES - GENERAL
PAINLEVEL_OUTOF10: 0 - NO PAIN
PAINLEVEL_OUTOF10: 0 - NO PAIN

## 2024-04-24 ASSESSMENT — PAIN SCALES - WONG BAKER: WONGBAKER_NUMERICALRESPONSE: NO HURT

## 2024-04-24 ASSESSMENT — PATIENT HEALTH QUESTIONNAIRE - PHQ9
2. FEELING DOWN, DEPRESSED OR HOPELESS: SEVERAL DAYS
1. LITTLE INTEREST OR PLEASURE IN DOING THINGS: SEVERAL DAYS
SUM OF ALL RESPONSES TO PHQ9 QUESTIONS 1 & 2: 2

## 2024-04-24 NOTE — ED PROVIDER NOTES
"HPI   Chief Complaint   Patient presents with    Altered Mental Status     Patient had brain surgery in 2021 for focal seizures, ever since then her mental status has been declining. Patient unable to answer questions or follow commands appropriately since then.  concerned for increased hallucinations, patient asking \"where did the doctor go\" at home.  is frustrated with no significant findings at different hospitals and wants her checked out again. Scheduled for EEG in May.        This is a 58-year-old female with past medical history of seizures, migraines, COPD, but thyroidism, GERD presenting to the emergency department for altered mental status.  History comes from  as well as daughter.  Patient reportedly has had long-term decline ever since having brain surgery approximately 3 years ago.  She is being followed by neurology.  Over the last week this has seemed to have gotten acutely worse.  Patient is more confused, endorsing is seen in people who are not there.  She constantly needs direction/instructions to care for herself.  She has not had any falls or trauma.  Patient has had an occasional cough over this time.  She otherwise has not had any reported fevers.  Patient herself is difficult to obtain answers from.  She requires repeat questions and largely replies \"no\" to review of systems.      History provided by:  Patient   used: No                        Beth Coma Scale Score: 14                     Patient History   History reviewed. No pertinent past medical history.  Past Surgical History:   Procedure Laterality Date    OTHER SURGICAL HISTORY  03/10/2022    Colonoscopy    OTHER SURGICAL HISTORY  03/10/2022    Inguinal hernia repair    OTHER SURGICAL HISTORY  06/13/2022    Brain surgery     Family History   Problem Relation Name Age of Onset    Diabetes Mother      Heart failure Father      Diabetes Father      Skin cancer Father       Social History "     Tobacco Use    Smoking status: Former     Current packs/day: 0.00     Types: Cigarettes     Quit date: 2009     Years since quitting: 15.3    Smokeless tobacco: Never   Vaping Use    Vaping status: Never Used   Substance Use Topics    Alcohol use: Not Currently    Drug use: Never       Physical Exam   ED Triage Vitals [04/23/24 1734]   Temperature Heart Rate Respirations BP   37.3 °C (99.1 °F) 72 18 125/56      Pulse Ox Temp Source Heart Rate Source Patient Position   96 % Temporal Monitor Sitting      BP Location FiO2 (%)     Right arm --       Physical Exam  GEN: no acute distress, slow to answer and requires repeat questions.  Also requires repeat instructions during exam to which patient largely does not seem to understand  HEAD: atraumatic  EYES: EOMI, PEERL  NECK: supple, no C-spine tenderness, no stepoffs or deformities  CVS/CHEST: reg rate, nl rhythm, no murmurs/gallops/rubs  PULM: CTAB b/l no wheezes, crackles, or rhonchi   GI: NT/ND, no masses or organomegaly, soft, no guarding  BACK: no CVA tenderness   NEURO:  no facial asymmetry, moving all extremities, no focal deficits  PSYCH: AAOx1 (self) answers questions appropriately  ED Course & MDM   Diagnoses as of 04/23/24 2354   Altered mental status, unspecified altered mental status type   Pneumonia of right lung due to infectious organism, unspecified part of lung   Bacterial UTI       Medical Decision Making  This is a 58-year-old female with past medical history of seizures, migraines, COPD, but thyroidism, GERD presenting to the emergency department for altered mental status.  Patient stable upon presentation to the emergency department, no acute distress and vitals are unremarkable.  On exam patient is oriented x 1.  She does seem largely confused with difficulty contributing to her history or following commands during physical exam.  Lungs are otherwise clear, abdomen is nontender.  Not appear to be fluid overloaded.  While some of this is  chronic, patient was worked up for potential metabolic/infectious process that could be contributing to her symptoms.  There is no laterality or focal deficits that would be concerning for stroke.  She has had an occasional cough which does raise some concern for potential pneumonia.  She has no signs of trauma and family is denying any recent falls.    CT of patient's head showed no acute process.  Patient's lab work did show a leukocytosis as well as findings concerning for pneumonia on her chest x-ray as well as UTI on her urinalysis. Patient treated with Levaquin due to antibiotic allergies.   Patient's vitals are stable she does not appear to be septic.  Patient care discussed with daughter and  at bedside.  At this time they have concerns for caring her at home given her altered mental status and recent decline.  I do feel patient would benefit from admission and further management.  Patient discussed with Dr. Sepulveda who will admit.      Procedure  Procedures     Rj Rodriguez MD  04/23/24 9029       Rj Rodriguez MD  04/23/24 1006

## 2024-04-24 NOTE — CARE PLAN
The patient's goals for the shift include rest    The clinical goals for the shift include maintain safety, rest and comfort    Over the shift, the patient did make progress toward the following goals.

## 2024-04-24 NOTE — H&P
History Of Present Illness  Elana Cox is a 58 y.o. 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  Patient was obtained from daughter at bedside.  Daughter states that her mother has gone more confused and has begun to have visual hallucinations.  She is currently ANO x 1 which is her baseline.  She has also had a 2-day history of a productive cough of yellowish sputum with some mild shortness of breath.  She is also had some fever and chills.  She denies any urinary symptoms.  She denies any constipation or diarrhea.  She denies any chest pain, palpitations.  Ever since having a left temporal lobectomy in 2020, she has developed complex partial seizures where she would have staring episodes and periods where she would rub her hands together and get agitated.  She would also moved back and forth while sitting or lying down.  She is found increasingly difficult with coordination and balance at times.  She has also had difficulty expressing speech and unable to fully form sentences at times.  She lives at home with her family that her daughter's sister with some activities.    On arrival to the ED, vitals showed /56.  HR 72.  RR 18.  96% SpO2 on RA.  Afebrile.  Significant labs showed WCC 15.  Neutrophils 13.  Urinalysis showed moderate leukocyte esterase with 1+ bacteria and 6-10 WBCs in urine along with 2+ calcium oxalate crystals.  CXR shows a small focus of right basilar pneumonia.  CT head showed volume loss with left temporal encephalomalacia with craniotomy changes.  There is no acute intracranial abnormality.  ED started her on antibiotic Levaquin.  She is being admitted to medicine team for further management of  "aspiration pneumonia and UTI and worsening confusion.    CODE STATUS: Full code     Past Medical History  As above    Surgical History  Past Surgical History:   Procedure Laterality Date    OTHER SURGICAL HISTORY  03/10/2022    Colonoscopy    OTHER SURGICAL HISTORY  03/10/2022    Inguinal hernia repair    OTHER SURGICAL HISTORY  06/13/2022    Brain surgery        Social History  She reports that she quit smoking about 15 years ago. Her smoking use included cigarettes. She has never used smokeless tobacco. She reports that she does not currently use alcohol. She reports that she does not use drugs.    Family History  Family History   Problem Relation Name Age of Onset    Diabetes Mother      Heart failure Father      Diabetes Father      Skin cancer Father          Allergies  Penicillins and Sulfamethoxazole-trimethoprim    Review of Systems   A 12 point review of systems was performed and otherwise negative except as stated in the HPI.   Physical Exam  General: Agitated.  ANO x 1.  HEENT:  Normocephalic, atraumatic, mucus membranes moist.   Neck:  Trachea midline.  No JVD.    Chest: Decreased breath sounds bilaterally.  No wheezing.  No crackles  CV:  Regular rate and rhythm.  Positive S1/S2.   Abdomen: Bowel sounds present in all four quadrants, abdomen is soft, non-tender, non-distended.  Extremities:  No lower extremity edema or cyanosis.   Neurological:  AAOx1. No focal deficits.  Skin:  Warm and dry.   Last Recorded Vitals  Blood pressure 142/66, pulse 61, temperature 36.7 °C (98.1 °F), resp. rate 16, height 1.753 m (5' 9\"), weight 90.7 kg (200 lb), SpO2 97%.    Relevant Results  All labs and images reviewed by myself.     Assessment/Plan   Elana Cox is a 58 y.o. 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  She is ANO x 1 at baseline.  She has leukocytosis.  CXR shows evidence of right lower lobe pneumonia.  Urinalysis is positive for leukocyte Estrace. She is being admitted to medicine team for further management of right lower lobe pneumonia and UTI along with worsening confusion.    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  She also has a UTI.  She is also having worsening confusion over the last couple of days including increased staring episodes and rubbing hands together and visual hallucinations.  CT head shows volume loss of left temporal encephalomalacia with craniotomy changes.  She is currently ANO x 1 which is her baseline.  We believe that her worsening confusion is secondary to development of pneumonia and UTI.    Plan:  - We will treat RLL pneumonia and UTI both with IV Levaquin 750 mg once daily.  We will also give Mucinex for cough.  We also give incentive spirometry.  We obtained urinary antigens including strep pneumonia, mycoplasma, Legionella.  Patient does not require any oxygen and is not acutely hypoxic.  - We will obtain urine culture and follow-up antibiotic sensitivities.  There is no previous urine culture on file.  - We will continue patient's home Lamictal 200 mg twice daily and Keppra 250 mg twice daily for her localization-related epilepsy with complex partial seizures.  We will also obtain Keppra level.  - Will obtain TSH and T3 levels to determine if  hypothyroidism is a component that is contributing to her presentation of worsening confusion.  Continue home levothyroxine.    # COPD  # Recurrent major depression  # HLD  # GERD  - Continue home Lipitor, vitamin D2, levothyroxine, Protonix, sertraline, Kenalog 0.1% cream    - DVT PPx: Chentex    Dae Valdez MD  PGY1 internal medicine

## 2024-04-25 VITALS
DIASTOLIC BLOOD PRESSURE: 51 MMHG | TEMPERATURE: 97.9 F | OXYGEN SATURATION: 92 % | HEIGHT: 69 IN | SYSTOLIC BLOOD PRESSURE: 105 MMHG | WEIGHT: 200 LBS | HEART RATE: 60 BPM | RESPIRATION RATE: 16 BRPM | BODY MASS INDEX: 29.62 KG/M2

## 2024-04-25 DIAGNOSIS — E66.9 OBESITY, UNSPECIFIED: ICD-10-CM

## 2024-04-25 LAB
ALBUMIN SERPL BCP-MCNC: 4.1 G/DL (ref 3.4–5)
ALP SERPL-CCNC: 77 U/L (ref 33–110)
ALT SERPL W P-5'-P-CCNC: 7 U/L (ref 7–45)
ANION GAP SERPL CALC-SCNC: 13 MMOL/L (ref 10–20)
AST SERPL W P-5'-P-CCNC: 11 U/L (ref 9–39)
BILIRUB SERPL-MCNC: 0.5 MG/DL (ref 0–1.2)
BUN SERPL-MCNC: 14 MG/DL (ref 6–23)
CALCIUM SERPL-MCNC: 9.2 MG/DL (ref 8.6–10.3)
CHLORIDE SERPL-SCNC: 105 MMOL/L (ref 98–107)
CO2 SERPL-SCNC: 24 MMOL/L (ref 21–32)
CREAT SERPL-MCNC: 0.83 MG/DL (ref 0.5–1.05)
EGFRCR SERPLBLD CKD-EPI 2021: 81 ML/MIN/1.73M*2
ERYTHROCYTE [DISTWIDTH] IN BLOOD BY AUTOMATED COUNT: 13.4 % (ref 11.5–14.5)
GLUCOSE SERPL-MCNC: 111 MG/DL (ref 74–99)
HCT VFR BLD AUTO: 39 % (ref 36–46)
HGB BLD-MCNC: 12.6 G/DL (ref 12–16)
HOLD SPECIMEN: NORMAL
LEGIONELLA AG UR QL: NEGATIVE
MCH RBC QN AUTO: 27.5 PG (ref 26–34)
MCHC RBC AUTO-ENTMCNC: 32.3 G/DL (ref 32–36)
MCV RBC AUTO: 85 FL (ref 80–100)
NRBC BLD-RTO: 0 /100 WBCS (ref 0–0)
PLATELET # BLD AUTO: 242 X10*3/UL (ref 150–450)
POTASSIUM SERPL-SCNC: 4.3 MMOL/L (ref 3.5–5.3)
PROT SERPL-MCNC: 7 G/DL (ref 6.4–8.2)
RBC # BLD AUTO: 4.58 X10*6/UL (ref 4–5.2)
S PNEUM AG UR QL: NEGATIVE
SODIUM SERPL-SCNC: 138 MMOL/L (ref 136–145)
T4 FREE SERPL-MCNC: 1.29 NG/DL (ref 0.61–1.12)
WBC # BLD AUTO: 9.6 X10*3/UL (ref 4.4–11.3)

## 2024-04-25 PROCEDURE — 36415 COLL VENOUS BLD VENIPUNCTURE: CPT

## 2024-04-25 PROCEDURE — 85027 COMPLETE CBC AUTOMATED: CPT

## 2024-04-25 PROCEDURE — 2500000006 HC RX 250 W HCPCS SELF ADMINISTERED DRUGS (ALT 637 FOR ALL PAYERS)

## 2024-04-25 PROCEDURE — 84439 ASSAY OF FREE THYROXINE: CPT

## 2024-04-25 PROCEDURE — 2500000004 HC RX 250 GENERAL PHARMACY W/ HCPCS (ALT 636 FOR OP/ED)

## 2024-04-25 PROCEDURE — G0378 HOSPITAL OBSERVATION PER HR: HCPCS

## 2024-04-25 PROCEDURE — 80053 COMPREHEN METABOLIC PANEL: CPT

## 2024-04-25 PROCEDURE — 2500000006 HC RX 250 W HCPCS SELF ADMINISTERED DRUGS (ALT 637 FOR ALL PAYERS): Mod: MUE

## 2024-04-25 PROCEDURE — 2500000001 HC RX 250 WO HCPCS SELF ADMINISTERED DRUGS (ALT 637 FOR MEDICARE OP)

## 2024-04-25 RX ORDER — LEVOFLOXACIN 750 MG/1
750 TABLET ORAL DAILY
Qty: 7 TABLET | Refills: 0 | Status: SHIPPED | OUTPATIENT
Start: 2024-04-25 | End: 2024-05-02

## 2024-04-25 RX ORDER — PANTOPRAZOLE SODIUM 40 MG/1
40 TABLET, DELAYED RELEASE ORAL DAILY
Qty: 30 TABLET | Refills: 0 | Status: SHIPPED | OUTPATIENT
Start: 2024-04-25 | End: 2024-06-05 | Stop reason: SDUPTHER

## 2024-04-25 RX ORDER — LAMOTRIGINE 200 MG/1
200 TABLET ORAL 2 TIMES DAILY
Qty: 60 TABLET | Refills: 0 | Status: SHIPPED | OUTPATIENT
Start: 2024-04-25

## 2024-04-25 RX ORDER — LEVOFLOXACIN 750 MG/1
750 TABLET ORAL DAILY
Qty: 3 TABLET | Refills: 0 | Status: SHIPPED | OUTPATIENT
Start: 2024-04-25 | End: 2024-04-25

## 2024-04-25 RX ORDER — ERGOCALCIFEROL 1.25 MG/1
1 CAPSULE ORAL
Qty: 4 CAPSULE | Refills: 0 | Status: SHIPPED | OUTPATIENT
Start: 2024-04-28 | End: 2024-05-28

## 2024-04-25 RX ADMIN — LAMOTRIGINE 200 MG: 100 TABLET ORAL at 09:15

## 2024-04-25 RX ADMIN — ATORVASTATIN CALCIUM 20 MG: 20 TABLET, FILM COATED ORAL at 09:15

## 2024-04-25 RX ADMIN — SERTRALINE HYDROCHLORIDE 50 MG: 50 TABLET ORAL at 09:15

## 2024-04-25 RX ADMIN — LEVETIRACETAM 250 MG: 500 TABLET, FILM COATED ORAL at 09:15

## 2024-04-25 RX ADMIN — LEVOTHYROXINE SODIUM 175 MCG: 175 TABLET ORAL at 06:26

## 2024-04-25 RX ADMIN — PANTOPRAZOLE SODIUM 40 MG: 40 TABLET, DELAYED RELEASE ORAL at 09:15

## 2024-04-25 RX ADMIN — POLYETHYLENE GLYCOL 3350 17 G: 17 POWDER, FOR SOLUTION ORAL at 09:15

## 2024-04-25 RX ADMIN — ENOXAPARIN SODIUM 40 MG: 40 INJECTION SUBCUTANEOUS at 09:15

## 2024-04-25 NOTE — DISCHARGE INSTRUCTIONS
-Follow up with your Neurologist on the next scheduled appointment   -Follow up with your primary care provider in 1-2 weeks

## 2024-04-25 NOTE — DISCHARGE SUMMARY
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       lamoTRIgine 200 mg tablet  Commonly known as: LaMICtal           levETIRAcetam 250 mg tablet  Commonly known as: Keppra           levothyroxine 175 mcg tablet  Commonly known as: Synthroid, Levoxyl      Take 1 tablet (175 mcg) by mouth once daily.       pantoprazole 40 mg EC tablet  Commonly known as: ProtoNix      TAKE 1 TABLET BY MOUTH EVERY DAY       sertraline 50 mg tablet  Commonly known as: Zoloft           triamcinolone 0.1 % cream  Commonly known as: Kenalog      APPLY TOPICALLY 2 TIMES A DAY. APPLY TO AFFECTED AREA 1-2 TIMES DAILY AS NEEDED. AVOID FACE AND GROIN.                Test Results Pending At Discharge  Pending Labs       Order Current Status    Extra Tubes In process    Legionella Antigen, Urine In process    Mycoplasma pneumoniae antibody, IgM In process    SST TOP In process    Streptococcus pneumoniae Antigen, Urine In process    T4, free In process            Hospital Course  Elana Cox is a 58 y.o. 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  Patient has ANO x 1 at baseline but family noticed that she appeared to be more confused than usual in the last couple days.  In the ED the patient was hemodynamically stable.  Labs are notable for leukocytosis, UA suggestive of UTI, chest x-ray showing possible right basilar pneumonia, and CT head with chronic changes.  It was believed that patient's worsening mental status is due to the infections.  Extensive conversation was had with family regarding likely reversibility of patient's mental status following significant amounts of seizures and brain surgery.  Patient was treated with Levaquin for both infections.  Patient continued to improve and was deemed medically stable for discharge on 4/25/2024 with antibiotics .    Pertinent Physical Exam At Time of Discharge  Physical Exam  General: Pleasant.  ANO x 1.  HEENT:  Normocephalic, atraumatic, mucus membranes moist.   Neck:  Trachea midline.  No JVD.    Chest:   No wheezing.  No crackles  CV:  Regular rate and rhythm.  Positive S1/S2.   Abdomen: Bowel sounds present in all four quadrants, abdomen is soft, non-tender, non-distended.  Extremities:  No lower extremity edema or cyanosis.   Neurological:  AAOx1. No focal deficits.  Skin:  Warm and dry.     Outpatient Follow-Up  Future Appointments   Date Time Provider Department Center   5/6/2024  1:00 PM Donal Wiggins MD [id number]         Madi Pearson MD

## 2024-04-25 NOTE — HOSPITAL COURSE
Elana Cox is a 58 y.o. 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  Patient has ANO x 1 at baseline but family noticed that she appeared to be more confused than usual in the last couple days.  In the ED the patient was hemodynamically stable.  Labs are notable for leukocytosis, UA suggestive of UTI, chest x-ray showing possible right basilar pneumonia, and CT head with chronic changes.  It was believed that patient's worsening mental status is due to the infections.  Extensive conversation was had with family regarding likely reversibility of patient's mental status following significant amounts of seizures and brain surgery.  Patient was treated with Levaquin for both infections.  Patient continued to improve and was deemed medically stable for discharge on 4/25/2024.

## 2024-04-26 ENCOUNTER — TELEPHONE (OUTPATIENT)
Dept: PRIMARY CARE | Facility: CLINIC | Age: 59
End: 2024-04-26
Payer: MEDICARE

## 2024-04-26 ENCOUNTER — PATIENT OUTREACH (OUTPATIENT)
Dept: PRIMARY CARE | Facility: CLINIC | Age: 59
End: 2024-04-26
Payer: MEDICARE

## 2024-04-26 DIAGNOSIS — J18.9 PNEUMONIA OF RIGHT LOWER LOBE DUE TO INFECTIOUS ORGANISM: ICD-10-CM

## 2024-04-26 DIAGNOSIS — N39.0 URINARY TRACT INFECTION WITHOUT HEMATURIA, SITE UNSPECIFIED: ICD-10-CM

## 2024-04-26 DIAGNOSIS — G93.41 ACUTE METABOLIC ENCEPHALOPATHY: ICD-10-CM

## 2024-04-26 LAB — M PNEUMO IGM SER IA-ACNC: 0.26 U/L

## 2024-04-26 RX ORDER — PANTOPRAZOLE SODIUM 40 MG/1
40 TABLET, DELAYED RELEASE ORAL DAILY
Qty: 90 TABLET | OUTPATIENT
Start: 2024-04-26

## 2024-04-26 NOTE — PROGRESS NOTES
Discharge Facility:  [facility]     Discharge Diagnosis:  RLL pneumonia  UTI  Acute on Chronic metabolic encephalopathy superimposed on cognitive dysfunction    Admission Date:4/24/24  Discharge Date: 4/25/24    PCP Appointment Date:TBD-I am unable to make an appt due to no openings . Message sent to office staff requesting assistance. As well as encourged patient to call today to schedule.     Specialist Appointment Date:   Continue with previously scheduled neurology appointment     Neurology New Patient Visit with Donal Wiggins   Monday May 6, 2024 1:00 PM   [facility] [address] [facility] 5th Floor [facility] [id number] [phone]     Hospital Encounter and Summary: Linked   See discharge assessment below for further details  Engagement  Call Start Time: 1010 (spoke with  , Tod) (4/26/2024 10:09 AM)    Medications  Medications reviewed with patient/caregiver?: Yes (4/26/2024 10:09 AM)  Is the patient having any side effects they believe may be caused by any medication additions or changes?: No (4/26/2024 10:09 AM)  Does the patient have all medications ordered at discharge?: Yes (4/26/2024 10:09 AM)  Care Management Interventions: Provided patient education (4/26/2024 10:09 AM)  Prescription Comments: New:levoFLOXacin 750 mg tablet Commonly known as: Levaquin Take 1 tablet (750 mg) by mouth once daily for 7 days. (4/26/2024 10:09 AM)  Is the patient taking all medications as directed (includes completed medication regime)?: Yes (4/26/2024 10:09 AM)  Medication Comments: CM discussed referencing discharge paperwork to follow detailed daily medication schedule. (4/26/2024 10:09 AM)    Appointments  Does the patient have a primary care provider?: Yes (4/26/2024 10:09 AM)  Care Management Interventions: Educated patient on importance of making appointment (messaged PCP office to rob appt as there are no openings so I am unable to make an appt for pt) (4/26/2024  10:09 AM)  Has the patient kept scheduled appointments due by today?: Yes (4/26/2024 10:09 AM)  Care Management Interventions: Educated on importance of keeping appointment (Reviewed upcoming Neurology appt for May 6) (4/26/2024 10:09 AM)    Self Management  Has home health visited the patient within 72 hours of discharge?: Not applicable (4/26/2024 10:09 AM)  What Durable Medical Equipment (DME) was ordered?: n/a (4/26/2024 10:09 AM)    Patient Teaching  Does the patient have access to their discharge instructions?: Yes (4/26/2024 10:09 AM)  Care Management Interventions: Reviewed instructions with patient (4/26/2024 10:09 AM)  What is the patient's perception of their health status since discharge?: Same (4/26/2024 10:09 AM)  Is the patient/caregiver able to teach back the hierarchy of who to call/visit for symptoms/problems? PCP, Specialist, Home Health nurse, Urgent Care, ED, 911: Yes (4/26/2024 10:09 AM)    Wrap Up  Wrap Up Additional Comments: I introduced myself and the TCM program to Elana Cox's , Tod. Reviewed hospital stay and answered any questions. I gave my contact information and encouraged to call me if needing assistance or has any further questions prior to my next outreach. (4/26/2024 10:09 AM)  Call End Time: 1013 (4/26/2024 10:09 AM)

## 2024-04-26 NOTE — TELEPHONE ENCOUNTER
----- Message from Shira Ortiz sent at 4/26/2024 11:15 AM EDT -----  Regarding: RE: TCM appt needed - thank you  Patient does not want a follow up at this time  ----- Message -----  From: Leslie Watts LPN  Sent: 4/26/2024  10:09 AM EDT  To: Do [place] [id number] Clerical  Subject: TCM appt needed - thank you                      Hello,  Can you please contact pt to schedule hospital follow up within 14 days of discharge.    Discharge Facility:  [facility]     Discharge Diagnosis:  RLL pneumonia  UTI  Acute on Chronic metabolic encephalopathy superimposed on cognitive dysfunction    Admission Date:4/24/24  Discharge Date: 4/25/24    Thank you,  Leslie Watts LPN   Care Transitions Specialist

## 2024-05-03 ENCOUNTER — TELEPHONE (OUTPATIENT)
Dept: PRIMARY CARE | Facility: CLINIC | Age: 59
End: 2024-05-03
Payer: MEDICARE

## 2024-05-03 DIAGNOSIS — J45.909 ASTHMA, UNSPECIFIED ASTHMA SEVERITY, UNSPECIFIED WHETHER COMPLICATED, UNSPECIFIED WHETHER PERSISTENT (HHS-HCC): Primary | ICD-10-CM

## 2024-05-03 DIAGNOSIS — R21 RASH: ICD-10-CM

## 2024-05-03 DIAGNOSIS — J44.9 CHRONIC OBSTRUCTIVE PULMONARY DISEASE, UNSPECIFIED COPD TYPE (MULTI): ICD-10-CM

## 2024-05-03 RX ORDER — TRIAMCINOLONE ACETONIDE 1 MG/G
CREAM TOPICAL 2 TIMES DAILY
Qty: 30 G | Refills: 0 | Status: SHIPPED | OUTPATIENT
Start: 2024-05-03 | End: 2024-06-10 | Stop reason: HOSPADM

## 2024-05-03 NOTE — TELEPHONE ENCOUNTER
Son, Tod Cox jr left message stating that his mother has surgery but has an infection in her mouth and would like to know if you could send in Abx.   Please Advise

## 2024-05-06 ENCOUNTER — OFFICE VISIT (OUTPATIENT)
Dept: NEUROLOGY | Facility: HOSPITAL | Age: 59
End: 2024-05-06
Payer: MEDICARE

## 2024-05-06 VITALS
DIASTOLIC BLOOD PRESSURE: 76 MMHG | WEIGHT: 200 LBS | HEART RATE: 54 BPM | RESPIRATION RATE: 18 BRPM | HEIGHT: 65 IN | BODY MASS INDEX: 33.32 KG/M2 | SYSTOLIC BLOOD PRESSURE: 159 MMHG

## 2024-05-06 DIAGNOSIS — G93.81 HIPPOCAMPAL SCLEROSIS: ICD-10-CM

## 2024-05-06 DIAGNOSIS — Q04.8: ICD-10-CM

## 2024-05-06 DIAGNOSIS — R47.01 APHASIA: ICD-10-CM

## 2024-05-06 DIAGNOSIS — G40.119 TEMPORAL LOBE EPILEPSY, INTRACTABLE (MULTI): Primary | ICD-10-CM

## 2024-05-06 DIAGNOSIS — G40.109 TEMPORAL LOBE EPILEPSY (MULTI): ICD-10-CM

## 2024-05-06 PROCEDURE — 99205 OFFICE O/P NEW HI 60 MIN: CPT | Performed by: STUDENT IN AN ORGANIZED HEALTH CARE EDUCATION/TRAINING PROGRAM

## 2024-05-06 PROCEDURE — 99215 OFFICE O/P EST HI 40 MIN: CPT | Performed by: STUDENT IN AN ORGANIZED HEALTH CARE EDUCATION/TRAINING PROGRAM

## 2024-05-06 NOTE — PATIENT INSTRUCTIONS
"It was a pleasure to see you in clinic today. We have ordered an admission to our epilepsy monitoring unit. You will be called to schedule this. We will get an MRI at that time.     Compliance education: It is important to continue to try and achieve seizure control because of the potential for injury and illness due to seizures. In a very small minority of patients with generalized tonic clonic seizures (\"grand mal\"), breathing or heart function can stop during a seizure and result in demise (sudden unexpected death in epilepsy or SUDEP). [place] from seizures prevents this kind of outcome.    If you have any questions, please call my office at [phone]. If you have any sudden new concerning or worsening symptoms, call 911 and go to the Emergency Room. Otherwise, it was good seeing you today, and we will see you back in about 3 months.    Sincerely,  [facility]    "
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
## 2024-05-08 PROBLEM — E66.9 OBESITY: Status: ACTIVE | Noted: 2020-09-05

## 2024-05-08 PROBLEM — G40.909 SEIZURE DISORDER (MULTI): Status: ACTIVE | Noted: 2019-03-28

## 2024-05-08 PROBLEM — R41.82 ALTERED MENTAL STATUS: Status: ACTIVE | Noted: 2023-12-28

## 2024-05-08 PROBLEM — N39.0 URINARY TRACT BACTERIAL INFECTIONS: Status: ACTIVE | Noted: 2024-05-08

## 2024-05-08 PROBLEM — A49.9 URINARY TRACT BACTERIAL INFECTIONS: Status: ACTIVE | Noted: 2024-05-08

## 2024-05-08 PROBLEM — F39 MOOD DISORDER (CMS-HCC): Status: ACTIVE | Noted: 2020-10-05

## 2024-05-09 ENCOUNTER — APPOINTMENT (OUTPATIENT)
Dept: PRIMARY CARE | Facility: CLINIC | Age: 59
End: 2024-05-09
Payer: MEDICARE

## 2024-05-09 RX ORDER — GUAIFENESIN 600 MG/1
600 TABLET, EXTENDED RELEASE ORAL 2 TIMES DAILY PRN
COMMUNITY
Start: 2024-04-24 | End: 2024-06-10 | Stop reason: HOSPADM

## 2024-05-09 RX ORDER — TALC
3 POWDER (GRAM) TOPICAL DAILY PRN
COMMUNITY
Start: 2024-04-24 | End: 2024-06-10 | Stop reason: HOSPADM

## 2024-05-09 RX ORDER — ENOXAPARIN SODIUM 100 MG/ML
40 INJECTION SUBCUTANEOUS
COMMUNITY
Start: 2024-04-24 | End: 2024-06-10 | Stop reason: HOSPADM

## 2024-05-26 DIAGNOSIS — E66.9 OBESITY, UNSPECIFIED: ICD-10-CM

## 2024-06-04 ENCOUNTER — TELEPHONE (OUTPATIENT)
Dept: PRIMARY CARE | Facility: CLINIC | Age: 59
End: 2024-06-04
Payer: MEDICARE

## 2024-06-04 DIAGNOSIS — E66.9 OBESITY, UNSPECIFIED: ICD-10-CM

## 2024-06-04 NOTE — TELEPHONE ENCOUNTER
Pt son states that pt is out of pantoprazole and she is having a hard time. I tried to reorder and it will not let me. It says that it is pending with Dr. Hendrickson     Please advise   Protonix refilled to [facility]

## 2024-06-05 RX ORDER — PANTOPRAZOLE SODIUM 40 MG/1
40 TABLET, DELAYED RELEASE ORAL DAILY
Qty: 90 TABLET | Refills: 1 | OUTPATIENT
Start: 2024-06-05

## 2024-06-05 RX ORDER — PANTOPRAZOLE SODIUM 40 MG/1
40 TABLET, DELAYED RELEASE ORAL DAILY
Qty: 30 TABLET | Refills: 11 | Status: SHIPPED | OUTPATIENT
Start: 2024-06-05

## 2024-06-06 ENCOUNTER — APPOINTMENT (OUTPATIENT)
Dept: CARDIOLOGY | Facility: HOSPITAL | Age: 59
DRG: 101 | End: 2024-06-06
Payer: MEDICARE

## 2024-06-06 ENCOUNTER — HOSPITAL ENCOUNTER (INPATIENT)
Dept: NEUROLOGY | Facility: HOSPITAL | Age: 59
LOS: 4 days | Discharge: HOME | End: 2024-06-10
Attending: PSYCHIATRY & NEUROLOGY | Admitting: PSYCHIATRY & NEUROLOGY
Payer: MEDICARE

## 2024-06-06 DIAGNOSIS — G40.909 NONINTRACTABLE EPILEPSY WITHOUT STATUS EPILEPTICUS, UNSPECIFIED EPILEPSY TYPE (MULTI): ICD-10-CM

## 2024-06-06 DIAGNOSIS — G40.109 TEMPORAL LOBE EPILEPSY (MULTI): Primary | ICD-10-CM

## 2024-06-06 DIAGNOSIS — G40.909 SEIZURE DISORDER (MULTI): ICD-10-CM

## 2024-06-06 DIAGNOSIS — R56.9 SEIZURE (MULTI): ICD-10-CM

## 2024-06-06 LAB
ALBUMIN SERPL BCP-MCNC: 4.3 G/DL (ref 3.4–5)
ALP SERPL-CCNC: 76 U/L (ref 33–110)
ALT SERPL W P-5'-P-CCNC: 5 U/L (ref 7–45)
ANION GAP SERPL CALC-SCNC: 17 MMOL/L (ref 10–20)
AST SERPL W P-5'-P-CCNC: 10 U/L (ref 9–39)
BASOPHILS # BLD AUTO: 0.04 X10*3/UL (ref 0–0.1)
BASOPHILS NFR BLD AUTO: 0.6 %
BILIRUB SERPL-MCNC: 0.7 MG/DL (ref 0–1.2)
BUN SERPL-MCNC: 16 MG/DL (ref 6–23)
CALCIUM SERPL-MCNC: 9.1 MG/DL (ref 8.6–10.6)
CHLORIDE SERPL-SCNC: 107 MMOL/L (ref 98–107)
CO2 SERPL-SCNC: 20 MMOL/L (ref 21–32)
CREAT SERPL-MCNC: 0.9 MG/DL (ref 0.5–1.05)
EGFRCR SERPLBLD CKD-EPI 2021: 74 ML/MIN/1.73M*2
EOSINOPHIL # BLD AUTO: 0.06 X10*3/UL (ref 0–0.7)
EOSINOPHIL NFR BLD AUTO: 1 %
ERYTHROCYTE [DISTWIDTH] IN BLOOD BY AUTOMATED COUNT: 14 % (ref 11.5–14.5)
GLUCOSE SERPL-MCNC: 83 MG/DL (ref 74–99)
HCT VFR BLD AUTO: 38.7 % (ref 36–46)
HGB BLD-MCNC: 12.4 G/DL (ref 12–16)
IMM GRANULOCYTES # BLD AUTO: 0.02 X10*3/UL (ref 0–0.7)
IMM GRANULOCYTES NFR BLD AUTO: 0.3 % (ref 0–0.9)
LEVETIRACETAM SERPL-MCNC: 6 UG/ML (ref 10–40)
LYMPHOCYTES # BLD AUTO: 1.54 X10*3/UL (ref 1.2–4.8)
LYMPHOCYTES NFR BLD AUTO: 24.6 %
MCH RBC QN AUTO: 27.6 PG (ref 26–34)
MCHC RBC AUTO-ENTMCNC: 32 G/DL (ref 32–36)
MCV RBC AUTO: 86 FL (ref 80–100)
MONOCYTES # BLD AUTO: 0.3 X10*3/UL (ref 0.1–1)
MONOCYTES NFR BLD AUTO: 4.8 %
NEUTROPHILS # BLD AUTO: 4.29 X10*3/UL (ref 1.2–7.7)
NEUTROPHILS NFR BLD AUTO: 68.7 %
NRBC BLD-RTO: 0 /100 WBCS (ref 0–0)
PLATELET # BLD AUTO: 241 X10*3/UL (ref 150–450)
POTASSIUM SERPL-SCNC: 3.6 MMOL/L (ref 3.5–5.3)
PROT SERPL-MCNC: 6.8 G/DL (ref 6.4–8.2)
RBC # BLD AUTO: 4.5 X10*6/UL (ref 4–5.2)
SODIUM SERPL-SCNC: 140 MMOL/L (ref 136–145)
WBC # BLD AUTO: 6.3 X10*3/UL (ref 4.4–11.3)

## 2024-06-06 PROCEDURE — 85025 COMPLETE CBC W/AUTO DIFF WBC: CPT | Performed by: STUDENT IN AN ORGANIZED HEALTH CARE EDUCATION/TRAINING PROGRAM

## 2024-06-06 PROCEDURE — 4A10X4Z MONITORING OF CENTRAL NERVOUS ELECTRICAL ACTIVITY, EXTERNAL APPROACH: ICD-10-PCS | Performed by: PSYCHIATRY & NEUROLOGY

## 2024-06-06 PROCEDURE — 36415 COLL VENOUS BLD VENIPUNCTURE: CPT | Performed by: STUDENT IN AN ORGANIZED HEALTH CARE EDUCATION/TRAINING PROGRAM

## 2024-06-06 PROCEDURE — 99223 1ST HOSP IP/OBS HIGH 75: CPT | Performed by: STUDENT IN AN ORGANIZED HEALTH CARE EDUCATION/TRAINING PROGRAM

## 2024-06-06 PROCEDURE — 80177 DRUG SCRN QUAN LEVETIRACETAM: CPT | Performed by: STUDENT IN AN ORGANIZED HEALTH CARE EDUCATION/TRAINING PROGRAM

## 2024-06-06 PROCEDURE — 2500000002 HC RX 250 W HCPCS SELF ADMINISTERED DRUGS (ALT 637 FOR MEDICARE OP, ALT 636 FOR OP/ED): Mod: MUE | Performed by: STUDENT IN AN ORGANIZED HEALTH CARE EDUCATION/TRAINING PROGRAM

## 2024-06-06 PROCEDURE — 95720 EEG PHY/QHP EA INCR W/VEEG: CPT | Performed by: PSYCHIATRY & NEUROLOGY

## 2024-06-06 PROCEDURE — 95700 EEG CONT REC W/VID EEG TECH: CPT

## 2024-06-06 PROCEDURE — 2500000001 HC RX 250 WO HCPCS SELF ADMINISTERED DRUGS (ALT 637 FOR MEDICARE OP): Performed by: STUDENT IN AN ORGANIZED HEALTH CARE EDUCATION/TRAINING PROGRAM

## 2024-06-06 PROCEDURE — 80175 DRUG SCREEN QUAN LAMOTRIGINE: CPT | Performed by: STUDENT IN AN ORGANIZED HEALTH CARE EDUCATION/TRAINING PROGRAM

## 2024-06-06 PROCEDURE — 1100000001 HC PRIVATE ROOM DAILY

## 2024-06-06 PROCEDURE — 80053 COMPREHEN METABOLIC PANEL: CPT | Performed by: STUDENT IN AN ORGANIZED HEALTH CARE EDUCATION/TRAINING PROGRAM

## 2024-06-06 PROCEDURE — 93005 ELECTROCARDIOGRAM TRACING: CPT

## 2024-06-06 PROCEDURE — 2500000004 HC RX 250 GENERAL PHARMACY W/ HCPCS (ALT 636 FOR OP/ED): Performed by: STUDENT IN AN ORGANIZED HEALTH CARE EDUCATION/TRAINING PROGRAM

## 2024-06-06 PROCEDURE — 84075 ASSAY ALKALINE PHOSPHATASE: CPT | Performed by: STUDENT IN AN ORGANIZED HEALTH CARE EDUCATION/TRAINING PROGRAM

## 2024-06-06 PROCEDURE — 95716 VEEG EA 12-26HR CONT MNTR: CPT

## 2024-06-06 RX ORDER — ENOXAPARIN SODIUM 100 MG/ML
40 INJECTION SUBCUTANEOUS EVERY 24 HOURS
Status: DISCONTINUED | OUTPATIENT
Start: 2024-06-06 | End: 2024-06-10 | Stop reason: HOSPADM

## 2024-06-06 RX ORDER — LEVOTHYROXINE SODIUM 125 UG/1
125 TABLET ORAL DAILY
COMMUNITY

## 2024-06-06 RX ORDER — LAMOTRIGINE 100 MG/1
200 TABLET ORAL 2 TIMES DAILY
Status: DISCONTINUED | OUTPATIENT
Start: 2024-06-06 | End: 2024-06-10 | Stop reason: HOSPADM

## 2024-06-06 RX ORDER — SERTRALINE HYDROCHLORIDE 50 MG/1
50 TABLET, FILM COATED ORAL DAILY
Status: DISCONTINUED | OUTPATIENT
Start: 2024-06-06 | End: 2024-06-10 | Stop reason: HOSPADM

## 2024-06-06 RX ORDER — ATORVASTATIN CALCIUM 20 MG/1
20 TABLET, FILM COATED ORAL DAILY
Status: DISCONTINUED | OUTPATIENT
Start: 2024-06-06 | End: 2024-06-10 | Stop reason: HOSPADM

## 2024-06-06 RX ORDER — DIPHENHYDRAMINE HCL 25 MG
25 CAPSULE ORAL EVERY 6 HOURS PRN
Status: DISCONTINUED | OUTPATIENT
Start: 2024-06-06 | End: 2024-06-10 | Stop reason: HOSPADM

## 2024-06-06 RX ORDER — LEVOTHYROXINE SODIUM 125 UG/1
125 TABLET ORAL DAILY
Status: DISCONTINUED | OUTPATIENT
Start: 2024-06-06 | End: 2024-06-10 | Stop reason: HOSPADM

## 2024-06-06 RX ORDER — ACETAMINOPHEN 325 MG/1
650 TABLET ORAL EVERY 4 HOURS PRN
Status: DISCONTINUED | OUTPATIENT
Start: 2024-06-06 | End: 2024-06-10 | Stop reason: HOSPADM

## 2024-06-06 RX ORDER — LEVETIRACETAM 500 MG/1
500 TABLET ORAL 2 TIMES DAILY
COMMUNITY
End: 2024-06-10 | Stop reason: HOSPADM

## 2024-06-06 RX ORDER — LEVETIRACETAM 500 MG/1
500 TABLET ORAL ONCE
Status: COMPLETED | OUTPATIENT
Start: 2024-06-06 | End: 2024-06-06

## 2024-06-06 RX ORDER — LORAZEPAM 2 MG/ML
2 INJECTION INTRAMUSCULAR EVERY 5 MIN PRN
Status: DISCONTINUED | OUTPATIENT
Start: 2024-06-06 | End: 2024-06-10 | Stop reason: HOSPADM

## 2024-06-06 RX ORDER — PANTOPRAZOLE SODIUM 40 MG/1
40 TABLET, DELAYED RELEASE ORAL DAILY
Status: DISCONTINUED | OUTPATIENT
Start: 2024-06-06 | End: 2024-06-10 | Stop reason: HOSPADM

## 2024-06-06 RX ORDER — ENOXAPARIN SODIUM 100 MG/ML
40 INJECTION SUBCUTANEOUS ONCE
Status: DISCONTINUED | OUTPATIENT
Start: 2024-06-06 | End: 2024-06-06

## 2024-06-06 RX ADMIN — ENOXAPARIN SODIUM 40 MG: 100 INJECTION SUBCUTANEOUS at 22:13

## 2024-06-06 RX ADMIN — LAMOTRIGINE 200 MG: 100 TABLET ORAL at 22:13

## 2024-06-06 RX ADMIN — SERTRALINE 50 MG: 50 TABLET, FILM COATED ORAL at 22:13

## 2024-06-06 RX ADMIN — LEVETIRACETAM 500 MG: 500 TABLET, FILM COATED ORAL at 22:13

## 2024-06-06 SDOH — SOCIAL STABILITY: SOCIAL INSECURITY: ARE YOU OR HAVE YOU BEEN THREATENED OR ABUSED PHYSICALLY, EMOTIONALLY, OR SEXUALLY BY ANYONE?: NO

## 2024-06-06 SDOH — SOCIAL STABILITY: SOCIAL INSECURITY: HAS ANYONE EVER THREATENED TO HURT YOUR FAMILY OR YOUR PETS?: UNABLE TO ASSESS

## 2024-06-06 SDOH — SOCIAL STABILITY: SOCIAL INSECURITY: DOES ANYONE TRY TO KEEP YOU FROM HAVING/CONTACTING OTHER FRIENDS OR DOING THINGS OUTSIDE YOUR HOME?: UNABLE TO ASSESS

## 2024-06-06 SDOH — ECONOMIC STABILITY: TRANSPORTATION INSECURITY
IN THE PAST 12 MONTHS, HAS LACK OF TRANSPORTATION KEPT YOU FROM MEETINGS, WORK, OR FROM GETTING THINGS NEEDED FOR DAILY LIVING?: NO

## 2024-06-06 SDOH — ECONOMIC STABILITY: HOUSING INSECURITY
IN THE LAST 12 MONTHS, WAS THERE A TIME WHEN YOU DID NOT HAVE A STEADY PLACE TO SLEEP OR SLEPT IN A SHELTER (INCLUDING NOW)?: NO

## 2024-06-06 SDOH — ECONOMIC STABILITY: HOUSING INSECURITY: IN THE LAST 12 MONTHS, HOW MANY PLACES HAVE YOU LIVED?: 1

## 2024-06-06 SDOH — ECONOMIC STABILITY: INCOME INSECURITY
IN THE LAST 12 MONTHS, WAS THERE A TIME WHEN YOU WERE NOT ABLE TO PAY THE MORTGAGE OR RENT ON TIME?: PATIENT UNABLE TO ANSWER

## 2024-06-06 SDOH — ECONOMIC STABILITY: INCOME INSECURITY: IN THE LAST 12 MONTHS, WAS THERE A TIME WHEN YOU WERE NOT ABLE TO PAY THE MORTGAGE OR RENT ON TIME?: NO

## 2024-06-06 SDOH — ECONOMIC STABILITY: INCOME INSECURITY: HOW HARD IS IT FOR YOU TO PAY FOR THE VERY BASICS LIKE FOOD, HOUSING, MEDICAL CARE, AND HEATING?: NOT VERY HARD

## 2024-06-06 SDOH — SOCIAL STABILITY: SOCIAL INSECURITY: HAVE YOU HAD THOUGHTS OF HARMING ANYONE ELSE?: NO

## 2024-06-06 SDOH — SOCIAL STABILITY: SOCIAL INSECURITY: ABUSE: ADULT

## 2024-06-06 SDOH — ECONOMIC STABILITY: INCOME INSECURITY
HOW HARD IS IT FOR YOU TO PAY FOR THE VERY BASICS LIKE FOOD, HOUSING, MEDICAL CARE, AND HEATING?: PATIENT UNABLE TO ANSWER

## 2024-06-06 SDOH — SOCIAL STABILITY: SOCIAL INSECURITY: DO YOU FEEL UNSAFE GOING BACK TO THE PLACE WHERE YOU ARE LIVING?: YES

## 2024-06-06 SDOH — SOCIAL STABILITY: SOCIAL INSECURITY: DO YOU FEEL ANYONE HAS EXPLOITED OR TAKEN ADVANTAGE OF YOU FINANCIALLY OR OF YOUR PERSONAL PROPERTY?: UNABLE TO ASSESS

## 2024-06-06 SDOH — SOCIAL STABILITY: SOCIAL INSECURITY: HAVE YOU HAD ANY THOUGHTS OF HARMING ANYONE ELSE?: NO

## 2024-06-06 SDOH — SOCIAL STABILITY: SOCIAL INSECURITY: ARE THERE ANY APPARENT SIGNS OF INJURIES/BEHAVIORS THAT COULD BE RELATED TO ABUSE/NEGLECT?: NO

## 2024-06-06 SDOH — ECONOMIC STABILITY: TRANSPORTATION INSECURITY
IN THE PAST 12 MONTHS, HAS THE LACK OF TRANSPORTATION KEPT YOU FROM MEDICAL APPOINTMENTS OR FROM GETTING MEDICATIONS?: NO

## 2024-06-06 ASSESSMENT — ACTIVITIES OF DAILY LIVING (ADL)
DRESSING YOURSELF: NEEDS ASSISTANCE
BATHING: NEEDS ASSISTANCE
FEEDING YOURSELF: NEEDS ASSISTANCE
JUDGMENT_ADEQUATE_SAFELY_COMPLETE_DAILY_ACTIVITIES: NO
GROOMING: NEEDS ASSISTANCE
PATIENT'S MEMORY ADEQUATE TO SAFELY COMPLETE DAILY ACTIVITIES?: NO
WALKS IN HOME: INDEPENDENT
ADEQUATE_TO_COMPLETE_ADL: YES
TOILETING: NEEDS ASSISTANCE
HEARING - LEFT EAR: FUNCTIONAL
HEARING - RIGHT EAR: FUNCTIONAL

## 2024-06-06 ASSESSMENT — COGNITIVE AND FUNCTIONAL STATUS - GENERAL
PATIENT BASELINE BEDBOUND: NO
DRESSING REGULAR LOWER BODY CLOTHING: A LITTLE
TOILETING: A LITTLE
MOBILITY SCORE: 24
HELP NEEDED FOR BATHING: A LITTLE
PERSONAL GROOMING: A LITTLE
PERSONAL GROOMING: A LITTLE
TOILETING: A LITTLE
EATING MEALS: A LITTLE
DRESSING REGULAR UPPER BODY CLOTHING: A LITTLE
DAILY ACTIVITIY SCORE: 18
HELP NEEDED FOR BATHING: A LITTLE
EATING MEALS: A LITTLE
DRESSING REGULAR LOWER BODY CLOTHING: A LITTLE
DRESSING REGULAR UPPER BODY CLOTHING: A LITTLE
DAILY ACTIVITIY SCORE: 18
MOBILITY SCORE: 24

## 2024-06-06 ASSESSMENT — LIFESTYLE VARIABLES
HOW OFTEN DO YOU HAVE 6 OR MORE DRINKS ON ONE OCCASION: NEVER
SKIP TO QUESTIONS 9-10: 1
AUDIT-C TOTAL SCORE: 0
AUDIT-C TOTAL SCORE: 0
HOW MANY STANDARD DRINKS CONTAINING ALCOHOL DO YOU HAVE ON A TYPICAL DAY: PATIENT DOES NOT DRINK
HOW OFTEN DO YOU HAVE A DRINK CONTAINING ALCOHOL: NEVER

## 2024-06-06 ASSESSMENT — COLUMBIA-SUICIDE SEVERITY RATING SCALE - C-SSRS
1. IN THE PAST MONTH, HAVE YOU WISHED YOU WERE DEAD OR WISHED YOU COULD GO TO SLEEP AND NOT WAKE UP?: NO
6. HAVE YOU EVER DONE ANYTHING, STARTED TO DO ANYTHING, OR PREPARED TO DO ANYTHING TO END YOUR LIFE?: NO
2. HAVE YOU ACTUALLY HAD ANY THOUGHTS OF KILLING YOURSELF?: NO

## 2024-06-06 ASSESSMENT — PAIN SCALES - GENERAL
PAINLEVEL_OUTOF10: 0 - NO PAIN
PAINLEVEL_OUTOF10: 0 - NO PAIN

## 2024-06-06 ASSESSMENT — PAIN - FUNCTIONAL ASSESSMENT
PAIN_FUNCTIONAL_ASSESSMENT: 0-10
PAIN_FUNCTIONAL_ASSESSMENT: 0-10

## 2024-06-06 ASSESSMENT — PATIENT HEALTH QUESTIONNAIRE - PHQ9
2. FEELING DOWN, DEPRESSED OR HOPELESS: SEVERAL DAYS
1. LITTLE INTEREST OR PLEASURE IN DOING THINGS: NOT AT ALL
SUM OF ALL RESPONSES TO PHQ9 QUESTIONS 1 & 2: 1

## 2024-06-06 NOTE — CARE PLAN
The patient's goals for the shift include patientwill be oriented to unit    The clinical goals for the shift include patient will tolarate v-EEG electode placement    Over the shift, the patient did tolerated electrode placement.  Patient admitted to capture events will remain on home dose per doctor order, and monitor thru out the night via VEEG monitoring.

## 2024-06-06 NOTE — H&P
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 electronically signed by: KRISHNA MANZANO MD on Dec 26 2023  8:41PM  EST     Last Recorded Vitals  There were no vitals taken for this visit.    NEUROLOGIC EXAM:    MENTAL STATUS:  - Alert and oriented to person, oriented to place, [facility], year, month, date with choices  - She is able to follow simple commands, able to repeat simple sentences .  Able to identify objects with choices, unable to name simple objects.  Unable to read, able to name words with choices.  Verbal output markedly diminished, unable to spontaneously produce sentence.  Unable to demonstrate how to comb hair, use hammer or salute.         CRANIAL NERVES:  - CN I: Not Assessed  - CN II: Pupils constrict to light bilaterally 3->2 mm, visual fields intact bilaterally  - CN III, IV, VI: No gaze limitation or deviation.  Marked apraxia of eye movements, no vertical gaze palsy  - CN V: Facial sensation intact to light touch  - CN VII: No facial droop, closes eyes against resistance  - CN VIII: Hearing intact to voice  - CN IX, X: Palate elevates symmetrically  - CN XII: Tongue midline without atrophy or fasciculations    MOTOR:    - Strength: R                L  Shoulder Abd 5                5  Elbow Flex 5                5  Elbow Ext  5                5  Hip flexion 5                5  Knee Ext  5                 5  Knee Flex        5                 5  DorsiFlex 5                5  PlantarFlex       5                5    REFLEXES:  R                L  Biceps              3               3  Brachioradialis  3               3  Patellar              3               3  Achilles +2               +2  Plantar           Down            Down  Ankle Clonus   Absent          Absent    COORDINATION: Finger to nose and heel to shin intact bilaterally  SENSATION: Intact and symmetric to light touch and temperature in all extremities  GAIT: Deferred for patient's safety    Relevant Results  No results found for this or any previous visit (from the past 24 hour(s)).           Assessment    Elana Cox is a right handed  58 y.o. 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  Seizures prior to her surgery were daily dialeptic seizures with automatisms.  Since surgery she has had progressive cognitive decline mainly affecting her language function.  She recently saw Dr. Wiggins in clinic as a transfer of care from [facility].  Mild postop aphasia can occur after dominant temporal lobectomy however is usually transient and improves.  He has referred her to the [facility] to rule out electrographic seizures as a cause for her progressive language decline.       4D CLASSIFICATION  Type: EPE  Semiology:   1) Aura -> Automotor (D)       Lateralizing signs: none      Onset: 2020      Frequency:  multiple per day prior to surgery, last in Dec 2023  EZ: Left temporal  Etiology: Hippocampal sclerosis, FCD Type 1  Comorbidities: hypothyroidism  Current AEDs: -200, -500        Plan:  - current AEDs: lamotrigine 200-200, levetiracetam 500-500  - Continue current AEDs for now  - Will decide on weaning Keppra in the morning  - [facility] admission for 5 days to rule out seizures as etiology of cognitive decline (MRI brain at discharge)  - continuous video EEG  - seizure precautions  - ativan 2mg IV as  needed seizures lasting more than 5 minutes  - benadryl 25mg as needed q6h for itching    #Misc  - Home PPI, statin, Synthroid  - Regular diet  - lovenox for DVT ppx    FULL CODE     General Neurology: [id number]  Tariq Palomo MD, PhD Neurology Resident

## 2024-06-07 ENCOUNTER — HOSPITAL ENCOUNTER (INPATIENT)
Dept: NEUROLOGY | Facility: HOSPITAL | Age: 59
Discharge: HOME | DRG: 101 | End: 2024-06-07
Payer: MEDICARE

## 2024-06-07 LAB — LAMOTRIGINE SERPL-MCNC: 7.8 UG/ML (ref 2.5–15)

## 2024-06-07 PROCEDURE — 95720 EEG PHY/QHP EA INCR W/VEEG: CPT | Performed by: PSYCHIATRY & NEUROLOGY

## 2024-06-07 PROCEDURE — 2500000001 HC RX 250 WO HCPCS SELF ADMINISTERED DRUGS (ALT 637 FOR MEDICARE OP): Performed by: STUDENT IN AN ORGANIZED HEALTH CARE EDUCATION/TRAINING PROGRAM

## 2024-06-07 PROCEDURE — 1100000001 HC PRIVATE ROOM DAILY

## 2024-06-07 PROCEDURE — 95716 VEEG EA 12-26HR CONT MNTR: CPT

## 2024-06-07 PROCEDURE — 2500000004 HC RX 250 GENERAL PHARMACY W/ HCPCS (ALT 636 FOR OP/ED): Performed by: STUDENT IN AN ORGANIZED HEALTH CARE EDUCATION/TRAINING PROGRAM

## 2024-06-07 PROCEDURE — 2500000002 HC RX 250 W HCPCS SELF ADMINISTERED DRUGS (ALT 637 FOR MEDICARE OP, ALT 636 FOR OP/ED): Mod: MUE | Performed by: STUDENT IN AN ORGANIZED HEALTH CARE EDUCATION/TRAINING PROGRAM

## 2024-06-07 RX ORDER — LEVETIRACETAM 500 MG/1
500 TABLET ORAL 2 TIMES DAILY
Status: DISCONTINUED | OUTPATIENT
Start: 2024-06-07 | End: 2024-06-10 | Stop reason: HOSPADM

## 2024-06-07 RX ADMIN — ATORVASTATIN CALCIUM 20 MG: 20 TABLET, FILM COATED ORAL at 08:15

## 2024-06-07 RX ADMIN — LAMOTRIGINE 200 MG: 100 TABLET ORAL at 21:26

## 2024-06-07 RX ADMIN — SERTRALINE 50 MG: 50 TABLET, FILM COATED ORAL at 08:15

## 2024-06-07 RX ADMIN — LEVETIRACETAM 500 MG: 500 TABLET, FILM COATED ORAL at 12:08

## 2024-06-07 RX ADMIN — PANTOPRAZOLE SODIUM 40 MG: 40 TABLET, DELAYED RELEASE ORAL at 08:15

## 2024-06-07 RX ADMIN — LEVOTHYROXINE SODIUM 125 MCG: 0.12 TABLET ORAL at 06:01

## 2024-06-07 RX ADMIN — LEVETIRACETAM 500 MG: 500 TABLET, FILM COATED ORAL at 21:26

## 2024-06-07 RX ADMIN — LAMOTRIGINE 200 MG: 100 TABLET ORAL at 08:16

## 2024-06-07 RX ADMIN — ENOXAPARIN SODIUM 40 MG: 100 INJECTION SUBCUTANEOUS at 21:26

## 2024-06-07 ASSESSMENT — PAIN SCALES - GENERAL
PAINLEVEL_OUTOF10: 0 - NO PAIN
PAINLEVEL_OUTOF10: 0 - NO PAIN

## 2024-06-07 ASSESSMENT — COGNITIVE AND FUNCTIONAL STATUS - GENERAL
DAILY ACTIVITIY SCORE: 18
MOBILITY SCORE: 24
DRESSING REGULAR LOWER BODY CLOTHING: A LITTLE
TOILETING: A LITTLE
DRESSING REGULAR UPPER BODY CLOTHING: A LITTLE
PERSONAL GROOMING: A LITTLE
EATING MEALS: A LITTLE
HELP NEEDED FOR BATHING: A LITTLE

## 2024-06-07 ASSESSMENT — PAIN - FUNCTIONAL ASSESSMENT: PAIN_FUNCTIONAL_ASSESSMENT: 0-10

## 2024-06-07 NOTE — PROGRESS NOTES
"Elana Cox is a 58 y.o. female on day 1 of admission presenting with Temporal lobe epilepsy (Multi).      Subjective   No events captured overnight.        Objective     Last Recorded Vitals  Blood pressure 145/71, pulse 54, temperature 36.5 °C (97.7 °F), resp. rate 18, height 1.65 m (5' 4.96\"), weight 88.5 kg (195 lb), SpO2 95%.    Physical Exam  Constitutional:       General: She is awake.   Neurological:      Mental Status: She is alert.       Neurological Exam  Mental Status  Awake and alert. Speech: Had difficulty with producing sponatenous speech, could not name things or read or follow some commands that required her to recognize objects.    Relevant Results                                                   Assessment/Plan      Principal Problem:    Temporal lobe epilepsy (Multi)      ==================================================  Classification of Paroxysmal Episodes  ==================================================      1.          Diagnosis: Epileptic Paroxysmal Episode          Epileptogenic Zone: Left Temporal           Seizure Type:               1.                   Epileptic Seizure Semiology:  Aura =>  Automotor Seizure =>  Dialeptic Seizure                   Start From: 2008                   Frequency: multiple per day prior to surgery, last in Dec 2023           Lateralizing Sign:            Etiology: Left Mesial Temporal Sclerosis (Hippocampal Sclerosis) , FCD Type I      ==================================================  Current Video/EEG  ==================================================      Intericatal EEG Findings:           Non-Epileptiform Abnormality:               1. Continuous Slow,  Generalized              2. Continuous Slow, Left Temporal    ==================================================  Impression and Plan  ==================================================      Evolution in last 24 hours: No events captured and no seizures on vEEG      Subjective: Patient overall " doing well,  at bedside      Objective: vEEG shows continuous slowing no epileptiform discharges       Current non-AED Rx:       Impression: Elana Cox is a right handed  58 y.o. 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  Seizures prior to her surgery were daily dialeptic seizures with automatisms.  Since surgery she has had progressive cognitive decline mainly affecting her language function.  She recently saw Dr. Wiggins in clinic as a transfer of care from [facility].  Mild postop aphasia can occur after dominant temporal lobectomy however is usually transient and improves.  He has referred her to the [facility] to rule out electrographic seizures as a cause for her progressive language decline.  Thus far no episodes captured and no seizures on EEG. Patient will remain on current medications and will monitor for any clinical or subclincal seizures.       Plans:           1. continue vEEG          2. continue Keppra 500mg BID           3. continue lamictal 200mg BID          4. seizure and fall precautions          5. ativan for seizures >3 min    ==================================================  Medication Table  ==================================================      1. Date: 06/06/2024  Lamictal®(dose: 200-200 level: 7.8)  Keppra®(dose: 500-500 level: 6)        2. Date: 06/07/2024  Lamictal®(dose: 200-200)  Keppra®(dose: 500-500)      ==================================================  Seizure Onset Table  ==================================================      No known seizure onsets.             Ruchi Del Valle DO

## 2024-06-07 NOTE — NURSING NOTE
Assumed care of pt after shift change at 11pm.  Pt resting in bed, denies any needs at present.  vEEG monitoring con't to capture events.  Resp even and unlabored.  Pt's daughter at bedside.

## 2024-06-07 NOTE — CARE PLAN
The patient's goals for the shift include safety    The clinical goals for the shift include patient will tolarate v-EEG electode placement    Problem: Pain - Adult  Goal: Verbalizes/displays adequate comfort level or baseline comfort level  Outcome: Progressing     Problem: Safety - Adult  Goal: Free from fall injury  Outcome: Progressing     Problem: Chronic Conditions and Co-morbidities  Goal: Patient's chronic conditions and co-morbidity symptoms are monitored and maintained or improved  Outcome: Progressing

## 2024-06-08 ENCOUNTER — HOSPITAL ENCOUNTER (INPATIENT)
Dept: NEUROLOGY | Facility: HOSPITAL | Age: 59
Discharge: HOME | DRG: 101 | End: 2024-06-08
Payer: MEDICARE

## 2024-06-08 PROCEDURE — 95716 VEEG EA 12-26HR CONT MNTR: CPT

## 2024-06-08 PROCEDURE — 99232 SBSQ HOSP IP/OBS MODERATE 35: CPT | Performed by: STUDENT IN AN ORGANIZED HEALTH CARE EDUCATION/TRAINING PROGRAM

## 2024-06-08 PROCEDURE — 1100000001 HC PRIVATE ROOM DAILY

## 2024-06-08 PROCEDURE — 2500000001 HC RX 250 WO HCPCS SELF ADMINISTERED DRUGS (ALT 637 FOR MEDICARE OP): Performed by: STUDENT IN AN ORGANIZED HEALTH CARE EDUCATION/TRAINING PROGRAM

## 2024-06-08 PROCEDURE — 2500000004 HC RX 250 GENERAL PHARMACY W/ HCPCS (ALT 636 FOR OP/ED): Performed by: STUDENT IN AN ORGANIZED HEALTH CARE EDUCATION/TRAINING PROGRAM

## 2024-06-08 PROCEDURE — 2500000002 HC RX 250 W HCPCS SELF ADMINISTERED DRUGS (ALT 637 FOR MEDICARE OP, ALT 636 FOR OP/ED): Mod: MUE | Performed by: STUDENT IN AN ORGANIZED HEALTH CARE EDUCATION/TRAINING PROGRAM

## 2024-06-08 PROCEDURE — 95720 EEG PHY/QHP EA INCR W/VEEG: CPT | Performed by: PSYCHIATRY & NEUROLOGY

## 2024-06-08 RX ADMIN — PANTOPRAZOLE SODIUM 40 MG: 40 TABLET, DELAYED RELEASE ORAL at 09:35

## 2024-06-08 RX ADMIN — LEVOTHYROXINE SODIUM 125 MCG: 0.12 TABLET ORAL at 05:50

## 2024-06-08 RX ADMIN — LAMOTRIGINE 200 MG: 100 TABLET ORAL at 09:35

## 2024-06-08 RX ADMIN — SERTRALINE 50 MG: 50 TABLET, FILM COATED ORAL at 09:35

## 2024-06-08 RX ADMIN — DIPHENHYDRAMINE HYDROCHLORIDE 25 MG: 25 CAPSULE ORAL at 22:23

## 2024-06-08 RX ADMIN — LAMOTRIGINE 200 MG: 100 TABLET ORAL at 21:13

## 2024-06-08 RX ADMIN — ATORVASTATIN CALCIUM 20 MG: 20 TABLET, FILM COATED ORAL at 09:35

## 2024-06-08 RX ADMIN — LEVETIRACETAM 500 MG: 500 TABLET, FILM COATED ORAL at 09:35

## 2024-06-08 RX ADMIN — LEVETIRACETAM 500 MG: 500 TABLET, FILM COATED ORAL at 21:13

## 2024-06-08 RX ADMIN — ENOXAPARIN SODIUM 40 MG: 100 INJECTION SUBCUTANEOUS at 21:13

## 2024-06-08 ASSESSMENT — COGNITIVE AND FUNCTIONAL STATUS - GENERAL
PERSONAL GROOMING: A LITTLE
TOILETING: A LITTLE
EATING MEALS: A LITTLE
DRESSING REGULAR LOWER BODY CLOTHING: A LITTLE
MOBILITY SCORE: 24
HELP NEEDED FOR BATHING: A LITTLE
DAILY ACTIVITIY SCORE: 18
DRESSING REGULAR UPPER BODY CLOTHING: A LITTLE

## 2024-06-08 ASSESSMENT — PAIN - FUNCTIONAL ASSESSMENT
PAIN_FUNCTIONAL_ASSESSMENT: 0-10

## 2024-06-08 ASSESSMENT — PAIN SCALES - GENERAL
PAINLEVEL_OUTOF10: 0 - NO PAIN

## 2024-06-08 NOTE — CARE PLAN
Problem: Safety - Adult  Goal: Free from fall injury  Outcome: Progressing     Problem: Pain - Adult  Goal: Verbalizes/displays adequate comfort level or baseline comfort level  Outcome: Progressing   The patient's goals for the shift include patientwill be oriented to unit    The clinical goals for the shift include safety    Over the shift, the patient did  make progress toward the following goals.

## 2024-06-08 NOTE — PROGRESS NOTES
"Elana Cox is a 58 y.o. female on day 2 of admission presenting with Temporal lobe epilepsy (Multi).      Subjective   No acute events overnight, no dialeptic episodes seen.        Objective     Last Recorded Vitals  Blood pressure 128/76, pulse 56, temperature 36.5 °C (97.7 °F), resp. rate 20, height 1.65 m (5' 4.96\"), weight 88.5 kg (195 lb), SpO2 97%.    Physical Exam  Neurological Exam  Relevant Results              Physical Exam  Constitutional:       General: She is awake.   Neurological:      Mental Status: She is alert.         Neurological Exam  Mental Status  Awake and alert. Speech: Had difficulty with producing sponatenous speech, could not name things or read or follow some commands that required her to recognize objects.                                     Assessment/Plan      Principal Problem:    Temporal lobe epilepsy (Multi)      ==================================================  Classification of Paroxysmal Episodes  ==================================================      1.          Diagnosis: Epileptic Paroxysmal Episode          Epileptogenic Zone: Left Temporal           Seizure Type:               1.                   Epileptic Seizure Semiology:  Aura =>  Automotor Seizure =>  Dialeptic Seizure                   Start From: 2008                   Frequency: multiple per day prior to surgery, last in Dec 2023           Lateralizing Sign:            Etiology: Left Mesial Temporal Sclerosis (Hippocampal Sclerosis) , FCD Type I      ==================================================  Current Video/EEG  ==================================================      Intericatal EEG Findings:           Non-Epileptiform Abnormality:               1. Continuous Slow,  Generalized              2. Continuous Slow, Left Temporal    ==================================================  Impression and Plan  ==================================================      Evolution in last 24 hours: No events captured " and no seizures on vEEG      Subjective: Patient overall doing well,  at bedside      Objective: vEEG shows continuous slowing and left sided slowing but no epileptiform discharges       Current non-AED Rx:       Impression: Elana Cox is a right handed  58 y.o. 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  Seizures prior to her surgery were daily dialeptic seizures with automatisms.  Since surgery she has had progressive cognitive decline mainly affecting her language function.  She recently saw Dr. Wiggins in clinic as a transfer of care from [facility].  Mild postop aphasia can occur after dominant temporal lobectomy however is usually transient and improves.  He has referred her to the [facility] to rule out electrographic seizures as a cause for her progressive language decline.  Thus far no episodes captured and no seizures on EEG. Patient will remain on current medications and will monitor for any clinical or subclincal seizures. Will plan for MRI upon discharge on monday.       Plans:           1. continue vEEG          2. continue Keppra 500mg BID           3. continue lamictal 200mg BID          4. seizure and fall precautions          5. ativan for seizures >3 min          6. MRI brain on discharge.     ==================================================  Medication Table  ==================================================      1. Date: 06/06/2024  Lamictal®(dose: 200-200 level: 7.8)  Keppra®(dose: 500-500 level: 6)        2. Date: 06/07/2024  Lamictal®(dose: 200-200)  Keppra®(dose: 500-500)        3. Date: 06/08/2024  Lamictal®(dose: 200-200)  Keppra®(dose: 500-500)      ==================================================  Seizure Onset Table  ==================================================      No known seizure onsets.               Ruchi Del Valle DO

## 2024-06-08 NOTE — CARE PLAN
The patient's goals for the shift include patientwill be oriented to unit    The clinical goals for the shift include Patient will tolerate vEEG monitoring to obtain data for characterization and madical management.    Patient had no seizures during this shift. No changes to AED's. Patient to continue on vEEG monitoring. Patient to have MRI upon discharge.

## 2024-06-09 ENCOUNTER — HOSPITAL ENCOUNTER (INPATIENT)
Dept: NEUROLOGY | Facility: HOSPITAL | Age: 59
Discharge: HOME | DRG: 101 | End: 2024-06-09
Payer: MEDICARE

## 2024-06-09 PROCEDURE — 2500000002 HC RX 250 W HCPCS SELF ADMINISTERED DRUGS (ALT 637 FOR MEDICARE OP, ALT 636 FOR OP/ED): Mod: MUE | Performed by: STUDENT IN AN ORGANIZED HEALTH CARE EDUCATION/TRAINING PROGRAM

## 2024-06-09 PROCEDURE — 95716 VEEG EA 12-26HR CONT MNTR: CPT

## 2024-06-09 PROCEDURE — 1100000001 HC PRIVATE ROOM DAILY

## 2024-06-09 PROCEDURE — 2500000001 HC RX 250 WO HCPCS SELF ADMINISTERED DRUGS (ALT 637 FOR MEDICARE OP): Performed by: STUDENT IN AN ORGANIZED HEALTH CARE EDUCATION/TRAINING PROGRAM

## 2024-06-09 PROCEDURE — 95720 EEG PHY/QHP EA INCR W/VEEG: CPT | Performed by: PSYCHIATRY & NEUROLOGY

## 2024-06-09 PROCEDURE — 2500000004 HC RX 250 GENERAL PHARMACY W/ HCPCS (ALT 636 FOR OP/ED): Performed by: STUDENT IN AN ORGANIZED HEALTH CARE EDUCATION/TRAINING PROGRAM

## 2024-06-09 PROCEDURE — 99232 SBSQ HOSP IP/OBS MODERATE 35: CPT | Performed by: STUDENT IN AN ORGANIZED HEALTH CARE EDUCATION/TRAINING PROGRAM

## 2024-06-09 RX ADMIN — ENOXAPARIN SODIUM 40 MG: 100 INJECTION SUBCUTANEOUS at 22:14

## 2024-06-09 RX ADMIN — LAMOTRIGINE 200 MG: 100 TABLET ORAL at 09:23

## 2024-06-09 RX ADMIN — ATORVASTATIN CALCIUM 20 MG: 20 TABLET, FILM COATED ORAL at 09:22

## 2024-06-09 RX ADMIN — LEVOTHYROXINE SODIUM 125 MCG: 0.12 TABLET ORAL at 06:22

## 2024-06-09 RX ADMIN — LEVETIRACETAM 500 MG: 500 TABLET, FILM COATED ORAL at 09:23

## 2024-06-09 RX ADMIN — LAMOTRIGINE 200 MG: 100 TABLET ORAL at 22:12

## 2024-06-09 RX ADMIN — LEVETIRACETAM 500 MG: 500 TABLET, FILM COATED ORAL at 22:12

## 2024-06-09 RX ADMIN — PANTOPRAZOLE SODIUM 40 MG: 40 TABLET, DELAYED RELEASE ORAL at 09:23

## 2024-06-09 RX ADMIN — SERTRALINE 50 MG: 50 TABLET, FILM COATED ORAL at 09:23

## 2024-06-09 ASSESSMENT — PAIN SCALES - GENERAL
PAINLEVEL_OUTOF10: 0 - NO PAIN

## 2024-06-09 ASSESSMENT — COGNITIVE AND FUNCTIONAL STATUS - GENERAL
DRESSING REGULAR LOWER BODY CLOTHING: A LITTLE
DRESSING REGULAR UPPER BODY CLOTHING: A LITTLE
PERSONAL GROOMING: A LITTLE
HELP NEEDED FOR BATHING: A LITTLE
EATING MEALS: A LITTLE
TOILETING: A LITTLE
MOBILITY SCORE: 24
DAILY ACTIVITIY SCORE: 18

## 2024-06-09 ASSESSMENT — PAIN - FUNCTIONAL ASSESSMENT
PAIN_FUNCTIONAL_ASSESSMENT: 0-10

## 2024-06-09 NOTE — CARE PLAN
The patient's goals for the shift include patientwill be oriented to unit    The clinical goals for the shift include Patient wll tolerate vEEG monitoring to obtain data for characterization nad medical management.    Patient had no seizures during this shift. Patient to have leads removed and vEEG discontinued tomorrow AM if no subclinical seizures noted, per MD orders. Patient to go to MRI prior to discharge after leads removed. Patient to continue on current AED's.

## 2024-06-09 NOTE — HOSPITAL COURSE
Elana Cox is a right handed 58 y.o. 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 Seizures prior to her surgery were daily dialeptic seizures with automatisms. Since surgery she has had progressive cognitive decline mainly affecting her language function. She recently saw Dr. Wiggins in clinic as a transfer of care from [facility]. Mild postop aphasia can occur after dominant temporal lobectomy however is usually transient and improves. She was referred her to the [facility] to rule out electrographic seizures as a cause for her progressive language decline.     Her home Keppra 500 mg twice daily and Lamictal 200 twice daily were continued on admission.  Keppra level on admission less than 6, lamotrigine level 7.8.  It appears she had been taking Keppra 250 twice daily at home and only recently switched over to 500 twice daily.  EEG showed left hemispheric slowing no seizures and she did not have any clinical episodes.  MRI brain with and without was obtained on day of discharge.  She was referred to cognitive neurology and will follow-up with epilepsy as well.    Do not to drive, use power tools or operate heavy machinery, and should not be on ladders. Use the shower and not the bath. Likewise, refrain from any activity which could result in injury to themselves or others if they had a seizure or lost consciousness. These restrictions should continue until instructed by a doctor to do otherwise.

## 2024-06-09 NOTE — CARE PLAN
Problem: Pain - Adult  Goal: Verbalizes/displays adequate comfort level or baseline comfort level  Outcome: Progressing     Problem: Safety - Adult  Goal: Free from fall injury  Outcome: Progressing   The patient's goals for the shift include patientwill be oriented to unit    The clinical goals for the shift include Patient will tolerate vEEG monitoring to obtain data for characterization and madical management    Over the shift, the patient did make progress toward the following goals.

## 2024-06-09 NOTE — PROGRESS NOTES
"Elana Cox is a 58 y.o. female on day 3 of admission presenting with Temporal lobe epilepsy (Multi).      Subjective   No acute events overnight, pateint has not had any episodes        Objective     Last Recorded Vitals  Blood pressure 124/78, pulse 61, temperature 36.5 °C (97.7 °F), resp. rate 18, height 1.65 m (5' 4.96\"), weight 88.5 kg (195 lb), SpO2 96%.    Physical Exam  Neurological Exam  Relevant Results                             Physical Exam  Constitutional:       General: She is awake.   Neurological:      Mental Status: She is alert.         Neurological Exam  Mental Status  Awake and alert. Speech: Had difficulty with producing sponatenous speech, could not name things or read or follow some commands that required her to recognize objects.                                      Assessment/Plan      Principal Problem:    Temporal lobe epilepsy (Multi)      ==================================================  Classification of Paroxysmal Episodes  ==================================================      1.          Diagnosis: Epileptic Paroxysmal Episode          Epileptogenic Zone: Left Temporal           Seizure Type:               1.                   Epileptic Seizure Semiology:  Aura =>  Automotor Seizure =>  Dialeptic Seizure                   Start From: 2008                   Frequency: multiple per day prior to surgery, last in Dec 2023           Lateralizing Sign:            Etiology: Left Mesial Temporal Sclerosis (Hippocampal Sclerosis) , FCD Type I      ==================================================  Current Video/EEG  ==================================================      Intericatal EEG Findings:           Non-Epileptiform Abnormality:               1. Continuous Slow,  Generalized              2. Continuous Slow, Left Temporal    ==================================================  Impression and Plan  ==================================================      Evolution in last 24 " hours: No events captured and no seizures on vEEG      Subjective: Patient overall doing well      Objective: vEEG shows continuous slowing and left sided slowing but no epileptiform discharges or seizures      Current non-AED Rx:       Impression: Elana Cox is a right handed  58 y.o. 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  Seizures prior to her surgery were daily dialeptic seizures with automatisms.  Since surgery she has had progressive cognitive decline mainly affecting her language function.  She recently saw Dr. Wiggins in clinic as a transfer of care from [facility].  Mild postop aphasia can occur after dominant temporal lobectomy however is usually transient and improves.  He has referred her to the [facility] to rule out electrographic seizures as a cause for her progressive language decline.  Thus far no episodes captured and no seizures on EEG. Patient will remain on current medications and will monitor for any clinical or subclincal seizures. Will plan for MRI upon discharge on monday.       Plans:           1. continue vEEG          2. continue Keppra 500mg BID           3. continue lamictal 200mg BID          4. seizure and fall precautions          5. ativan for seizures >3 min          6. MRI brain on discharge tomorrow .     ==================================================  Medication Table  ==================================================      1. Date: 06/06/2024  Lamictal®(dose: 200-200 level: 7.8)  Keppra®(dose: 500-500 level: 6)        2. Date: 06/07/2024  Lamictal®(dose: 200-200)  Keppra®(dose: 500-500)        3. Date: 06/08/2024  Lamictal®(dose: 200-200)  Keppra®(dose: 500-500)        4. Date: 06/09/2024  Lamictal®(dose: 200-200)  Keppra®(dose: 500-500)      ==================================================  Seizure Onset Table  ==================================================      No known seizure onsets.               Ruchi  Eligio, DO

## 2024-06-10 ENCOUNTER — APPOINTMENT (OUTPATIENT)
Dept: NEUROLOGY | Facility: HOSPITAL | Age: 59
End: 2024-06-10
Payer: MEDICARE

## 2024-06-10 VITALS
RESPIRATION RATE: 18 BRPM | DIASTOLIC BLOOD PRESSURE: 63 MMHG | OXYGEN SATURATION: 96 % | HEART RATE: 54 BPM | SYSTOLIC BLOOD PRESSURE: 120 MMHG | WEIGHT: 195 LBS | TEMPERATURE: 97.5 F | BODY MASS INDEX: 32.49 KG/M2 | HEIGHT: 65 IN

## 2024-06-10 PROCEDURE — 2500000002 HC RX 250 W HCPCS SELF ADMINISTERED DRUGS (ALT 637 FOR MEDICARE OP, ALT 636 FOR OP/ED): Performed by: STUDENT IN AN ORGANIZED HEALTH CARE EDUCATION/TRAINING PROGRAM

## 2024-06-10 PROCEDURE — 2500000001 HC RX 250 WO HCPCS SELF ADMINISTERED DRUGS (ALT 637 FOR MEDICARE OP): Performed by: STUDENT IN AN ORGANIZED HEALTH CARE EDUCATION/TRAINING PROGRAM

## 2024-06-10 PROCEDURE — 99239 HOSP IP/OBS DSCHRG MGMT >30: CPT | Performed by: STUDENT IN AN ORGANIZED HEALTH CARE EDUCATION/TRAINING PROGRAM

## 2024-06-10 PROCEDURE — 95718 EEG PHYS/QHP 2-12 HR W/VEEG: CPT | Performed by: PSYCHIATRY & NEUROLOGY

## 2024-06-10 PROCEDURE — 95713 VEEG 2-12 HR CONT MNTR: CPT

## 2024-06-10 RX ORDER — LEVETIRACETAM 500 MG/1
500 TABLET ORAL 2 TIMES DAILY
Start: 2024-06-10 | End: 2024-09-08

## 2024-06-10 RX ADMIN — LEVOTHYROXINE SODIUM 125 MCG: 0.12 TABLET ORAL at 07:47

## 2024-06-10 RX ADMIN — PANTOPRAZOLE SODIUM 40 MG: 40 TABLET, DELAYED RELEASE ORAL at 08:30

## 2024-06-10 RX ADMIN — ATORVASTATIN CALCIUM 20 MG: 20 TABLET, FILM COATED ORAL at 08:30

## 2024-06-10 RX ADMIN — LAMOTRIGINE 200 MG: 100 TABLET ORAL at 08:30

## 2024-06-10 RX ADMIN — SERTRALINE 50 MG: 50 TABLET, FILM COATED ORAL at 08:30

## 2024-06-10 RX ADMIN — LEVETIRACETAM 500 MG: 500 TABLET, FILM COATED ORAL at 08:30

## 2024-06-10 ASSESSMENT — PAIN SCALES - GENERAL: PAINLEVEL_OUTOF10: 0 - NO PAIN

## 2024-06-10 NOTE — NURSING NOTE
Update for MRI:  The patient is on vEEG and is not authorized for take down orders until after 7 am on Monday, providing she does not have any seizures overnight.  And of course, this comes at change of shift.  She will be available after 7:45/8:00am once the MD has reviewed her vEEG and the techs remove the leads. She is in a GOWN. She has IV access L 20g. She is NOT on IV fluids/drips. She does not need meds for the test.  What you should know is that she has aphasia. Expressive aphasia/word finding especially, but comprehension and understanding instructions is impaired too. Communication from staff should be slow, patient and repeated if necessary.

## 2024-06-10 NOTE — DISCHARGE SUMMARY
Discharge Diagnosis  Temporal lobe epilepsy (Multi)    Epilepsy Quality and Core Measure Topics  The patient was encouraged to keep a seizure diary  The patient was encouraged to practice good sleep hygiene  The risks and benefits of pertinent medications were discussed with the patient and/or family  Addressed all relevant psychiatric comorbidities  First Time Seizures  No this is not the patient's first seizure  Is this patient seizure free?  Yes, no treatment changes at this time  Should this patient observe standard seizure precautions?  Yes Reviewed seizure precautions with patient; specifically, the patient may not drive, may not operate heavy machinery, ought not swim unsupervised, should shower rather than bath, should be cautious with hot or heavy objects, and should not perform any activities at heights such as on a ladder. This will be re-assessed at the patient's next appointment.   Medication Side Effects Discussion Statement  The risks and benefits of pertinent medications were discussed with the patient and/or kin.    Issues Requiring Follow-Up  -Follow-up with epilepsy, Dr. Wiggins  -Referral to speech therapy, psychiatry, cognitive neurology  -Continue Keppra 500 twice daily, lamotrigine 200 twice daily    Test Results Pending At Discharge  Pending Labs       No current pending labs.            Hospital Course  Elana Cox is a right handed 58 y.o. 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 Seizures prior to her surgery were daily dialeptic seizures with automatisms. Since surgery she has had progressive cognitive decline mainly affecting her language function. She recently saw Dr. Wiggins in clinic as a transfer of care from [facility]. Mild postop aphasia can occur after dominant temporal lobectomy however is usually transient and improves. She was referred her to the [facility] to rule out electrographic seizures as a cause for her progressive  language decline.     Her home Keppra 500 mg twice daily and Lamictal 200 twice daily were continued on admission.  Keppra level on admission less than 6, lamotrigine level 7.8.  It appears she had been taking Keppra 250 twice daily at home and only recently switched over to 500 twice daily.  EEG showed left temporal slowing without seizures and she did not have any clinical episodes.  She was referred to cognitive neurology, speech therapy and psychiatry and will follow-up with epilepsy as well.  MRI brain could not be obtained on discharge due to scheduling issues so will need to be obtained outpatient when she follows up with epilepsy.     Do not to drive, use power tools or operate heavy machinery, and should not be on ladders. Use the shower and not the bath. Likewise, refrain from any activity which could result in injury to themselves or others if they had a seizure or lost consciousness. These restrictions should continue until instructed by a doctor to do otherwise.      Pertinent Physical Exam At Time of Discharge  Physical Exam    MENTAL STATUS:  - Alert and oriented to person, oriented to place, [facility], year, month, date with choices  - She is able to follow simple commands, able to repeat simple sentences .  Able to identify objects with choices, unable to name simple objects.  Unable to read, able to name words with choices.  Verbal output markedly diminished, unable to spontaneously produce sentence.  Unable to demonstrate how to comb hair, use hammer or salute.            CRANIAL NERVES:  - CN I: Not Assessed  - CN II: Pupils constrict to light bilaterally 3->2 mm, visual fields intact bilaterally  - CN III, IV, VI: No gaze limitation or deviation.  Marked apraxia of eye movements, no vertical gaze palsy  - CN V: Facial sensation intact to light touch  - CN VII: No facial droop, closes eyes against resistance  - CN VIII: Hearing intact to voice  - CN IX, X: Palate elevates symmetrically  - CN  XII: Tongue midline without atrophy or fasciculations     MOTOR:     - Strength:       R                L  Shoulder Abd  5                5  Elbow Flex       5                5  Elbow Ext         5                5  Hip flexion       5                5  Knee Ext           5                5  Knee Flex        5                 5  DorsiFlex          5                5  PlantarFlex       5                5     REFLEXES:         R                L  Biceps               3               3  Brachioradialis  3               3  Patellar              3               3  Achilles            +2               +2  Plantar           Down            Down  Ankle Clonus   Absent          Absent     COORDINATION: Finger to nose and heel to shin intact bilaterally  SENSATION: Intact and symmetric to light touch and temperature in all extremities  GAIT: Deferred for patient's safety      Home Medications     Medication List      CHANGE how you take these medications     levETIRAcetam 500 mg tablet; Commonly known as: Keppra; Take 1 tablet   (500 mg) by mouth 2 times a day.; What changed: Another medication with   the same name was removed. Continue taking this medication, and follow the   directions you see here.   levothyroxine 125 mcg tablet; Commonly known as: Synthroid, Levoxyl;   What changed: Another medication with the same name was removed. Continue   taking this medication, and follow the directions you see here.     CONTINUE taking these medications     atorvastatin 20 mg tablet; Commonly known as: Lipitor; Take 1 tablet (20   mg) by mouth once daily.   lamoTRIgine 200 mg tablet; Commonly known as: LaMICtal; Take 1 tablet   (200 mg) by mouth 2 times a day.   pantoprazole 40 mg EC tablet; Commonly known as: ProtoNix; Take 1 tablet   (40 mg) by mouth once daily.   sertraline 50 mg tablet; Commonly known as: Zoloft     STOP taking these medications     albuterol 90 mcg/actuation inhaler   enoxaparin 40 mg/0.4 mL syringe; Commonly  known as: Lovenox   guaiFENesin 600 mg 12 hr tablet; Commonly known as: Mucinex   melatonin 3 mg tablet   triamcinolone 0.1 % cream; Commonly known as: Kenalog       Outpatient Follow-Up  No future appointments.    Tariq Palomo MD

## 2024-06-10 NOTE — NURSING NOTE
Patient discharged to home. Discharge instructions reviewed with patient and family. Family able to verbalize understanding and recall. No prescriptions required. PIV removed intact.

## 2024-06-10 NOTE — DISCHARGE INSTRUCTIONS
Ms. Cox,    You were admitted to the  epilepsy monitoring unit to record your brain electrical activity to detect any seizures.  You did not have any seizures while you were admitted and you are brainwave just showed some slowing on the side where you had your surgery.  We did not make any changes to your antiseizure medications because these are controlling your seizures while.  We will have you follow-up with your seizure doctor Dr. Wiggins.  We will also have you follow-up with a neurologist that specializes in memory issues.    Continue taking Keppra (levetiracetam) 500 mg twice a day  Continue taking lamotrigine 200 mg twice a day      Do not to drive, use power tools or operate heavy machinery, and should not be on ladders. Use the shower and not the bath. Likewise, refrain from any activity which could result in injury to themselves or others if they had a seizure or lost consciousness. These restrictions should continue until instructed by a doctor to do otherwise.

## 2024-06-11 ENCOUNTER — PATIENT OUTREACH (OUTPATIENT)
Dept: PRIMARY CARE | Facility: CLINIC | Age: 59
End: 2024-06-11
Payer: MEDICARE

## 2024-06-11 NOTE — PROGRESS NOTES
Discharge Facility:  [facility]    Discharge Diagnosis:  Temporal lobe epilepsy     Admission Date:6/6/24  Discharge Date: 6/10/24    PCP Appointment Date:TBD-I am unable to make an appt due to no openings . Message sent to office staff requesting assistance. As well as encourged patient to call today to schedule.       Specialist Appointment Date:   7/12/2024  1:10 PM   Zak Romano MD  Endocrinology  NPI: [id number]  [address] [address]  Phone: [phone]    -Follow-up with epilepsy, Dr. Wiggins-neuro   -Referral to speech therapy, psychiatry, cognitive neurology    Hospital Encounter and Summary: Linked   See discharge assessment below for further details  Engagement  Call Start Time: 0000 (Spoke with  , Tod) (6/11/2024  8:57 AM)    Medications  Medications reviewed with patient/caregiver?: Yes (6/11/2024  8:57 AM)  Is the patient having any side effects they believe may be caused by any medication additions or changes?: No (6/11/2024  8:57 AM)  Does the patient have all medications ordered at discharge?: Not applicable (6/11/2024  8:57 AM)  Prescription Comments: STOP taking these medications     albuterol 90 mcg/actuation inhaler   enoxaparin 40 mg/0.4 mL syringe; Commonly known as: Lovenox   guaiFENesin 600 mg 12 hr tablet; Commonly known as: Mucinex   melatonin 3 mg tablet   triamcinolone 0.1 % cream; Commonly known as: Kenalog (6/11/2024  8:57 AM)  Is the patient taking all medications as directed (includes completed medication regime)?: Yes (6/11/2024  8:57 AM)    Appointments  Does the patient have a primary care provider?: Yes (6/11/2024  8:57 AM)  Care Management Interventions: Educated patient on importance of making appointment; Advised patient to make appointment (No opening for CM o schedule. Message sent to clerical staff to reach out to  to schedule.) (6/11/2024  8:57 AM)  Has the patient kept scheduled  appointments due by today?: Yes (6/11/2024  8:57 AM)  Care Management Interventions: Advised to schedule with specialist (advised to call Neuro clinic - recommend follow up with Dr Wiggins) (6/11/2024  8:57 AM)    Self Management  Has home health visited the patient within 72 hours of discharge?: Not applicable (6/11/2024  8:57 AM)  What Durable Medical Equipment (DME) was ordered?: n/a (6/11/2024  8:57 AM)    Patient Teaching  Does the patient have access to their discharge instructions?: Yes (6/11/2024  8:57 AM)  Care Management Interventions: Reviewed instructions with patient (reviewed with ) (6/11/2024  8:57 AM)  What is the patient's perception of their health status since discharge?: Same (6/11/2024  8:57 AM)  Is the patient/caregiver able to teach back the hierarchy of who to call/visit for symptoms/problems? PCP, Specialist, Home Health nurse, Urgent Care, ED, 911: Yes (6/11/2024  8:57 AM)  Patient/Caregiver Education Comments: I re-introduced myself and the TCM program to Elana's , Tod. Nereyda has been enrolled in TCM program w prior admissions. Reviewed hospital stay and answered any questions. I gave my contact information and encouraged to call me if needing assistance or has any further non-emergent questions prior to my next outreach. (6/11/2024  8:57 AM)    Wrap Up  Call End Time: 0900 (6/11/2024  8:57 AM)

## 2024-06-14 LAB
ATRIAL RATE: 50 BPM
P AXIS: 53 DEGREES
P OFFSET: 173 MS
P ONSET: 120 MS
PR INTERVAL: 202 MS
Q ONSET: 221 MS
QRS COUNT: 8 BEATS
QRS DURATION: 104 MS
QT INTERVAL: 462 MS
QTC CALCULATION(BAZETT): 421 MS
QTC FREDERICIA: 435 MS
R AXIS: -13 DEGREES
T AXIS: 59 DEGREES
T OFFSET: 452 MS
VENTRICULAR RATE: 50 BPM

## 2024-06-17 DIAGNOSIS — R56.9 SEIZURES (MULTI): ICD-10-CM

## 2024-06-25 ENCOUNTER — HOSPITAL ENCOUNTER (OUTPATIENT)
Dept: RADIOLOGY | Facility: CLINIC | Age: 59
Discharge: HOME | End: 2024-06-25
Payer: MEDICARE

## 2024-06-25 ENCOUNTER — DOCUMENTATION (OUTPATIENT)
Dept: SPEECH THERAPY | Facility: CLINIC | Age: 59
End: 2024-06-25
Payer: MEDICARE

## 2024-06-25 DIAGNOSIS — R56.9 SEIZURES (MULTI): ICD-10-CM

## 2024-06-25 PROCEDURE — A9575 INJ GADOTERATE MEGLUMI 0.1ML: HCPCS | Performed by: STUDENT IN AN ORGANIZED HEALTH CARE EDUCATION/TRAINING PROGRAM

## 2024-06-25 PROCEDURE — 2550000001 HC RX 255 CONTRASTS: Performed by: STUDENT IN AN ORGANIZED HEALTH CARE EDUCATION/TRAINING PROGRAM

## 2024-06-25 PROCEDURE — 70553 MRI BRAIN STEM W/O & W/DYE: CPT | Performed by: RADIOLOGY

## 2024-06-25 PROCEDURE — 70553 MRI BRAIN STEM W/O & W/DYE: CPT

## 2024-06-25 RX ORDER — GADOTERATE MEGLUMINE 376.9 MG/ML
18 INJECTION INTRAVENOUS
Status: COMPLETED | OUTPATIENT
Start: 2024-06-25 | End: 2024-06-25

## 2024-06-25 ASSESSMENT — ENCOUNTER SYMPTOMS
LOSS OF SENSATION IN FEET: 0
DEPRESSION: 0
OCCASIONAL FEELINGS OF UNSTEADINESS: 0

## 2024-06-25 NOTE — PROGRESS NOTES
Speech-Language Pathology                 Therapy Communication Note    Patient Name: Elana Cox  MRN: [id number]  Today's Date: 6/25/2024     Discipline: Speech Language Pathology    Missed Visit Reason:  Pt did not show for speech therapy evaluation.  Pt to call in to reschedule appointment when ready.

## 2024-07-02 ENCOUNTER — EVALUATION (OUTPATIENT)
Dept: SPEECH THERAPY | Facility: CLINIC | Age: 59
End: 2024-07-02
Payer: MEDICARE

## 2024-07-02 DIAGNOSIS — R47.01 APHASIA: Primary | ICD-10-CM

## 2024-07-02 PROCEDURE — 96105 ASSESSMENT OF APHASIA: CPT | Mod: GN | Performed by: SPEECH-LANGUAGE PATHOLOGIST

## 2024-07-02 ASSESSMENT — PAIN SCALES - GENERAL: PAINLEVEL_OUTOF10: 0 - NO PAIN

## 2024-07-02 ASSESSMENT — PAIN - FUNCTIONAL ASSESSMENT: PAIN_FUNCTIONAL_ASSESSMENT: 0-10

## 2024-07-02 NOTE — PROGRESS NOTES
Speech-Language Pathology    Adult Outpatient Speech-Language and Cognitive Assessment    Patient Name: Elana Cox  MRN: [id number]  : 1965  Today's Date: 24  Time Calculation  Start Time: 1415  Stop Time: 1515  Time Calculation (min): 60 min      Current Problem:   Aphasia    SLP Assessment:  Pt is a pleasant 59 year old female being seen with spouse present.  Pt currently presents with severe to profound global aphasia, affecting her verbal and written expression as well as her auditory and reading comprehension.  Pt did display echolalia at times and relied heavily on filler phrases (e.g. I don't know, ya know?, good, yeah, wait, and stuff, etc.).  She was not able to add detail and struggled more when given additional time as she became more frustrated that she was not able to generate the answer.  Pt was noted to rely heavily on body language cues from those around her to help her with both comprehension as well as how to respond to things.    Pt was noted to have significant speed of processing deficits and did better when directions were broken up into chunks and they were repeated for her.  She does appear to have some potential fine motor dyspraxia that would benefit from OT therapy. This is likely a result of pt's surgery and surgery.   Spouse is very supportive and wants wife to improve.   Elana Cox will benefit from skilled speech therapy at this time to address above deficits.     Related Medical History:   Pt had a history of dominant left temporal epilepsy after anterior lobectomy in .  Seizures prior to the surgery were daily dialeptic seizures with automatisms.  Since surgery, she has increasing cognitive decline that has been focused on her language production.  Pt has been referred for further work up on why the aphasia is persisting as this is typically transient following dominant temporal lobectomy per physician note 6/10/24.    Pt had MRI completed on 24 with the  "following findings:  \"Previous left temporal craniotomy with partial resection of the left temporal lobe. Associated abnormal white matter signal in the remaining portion of the left temporal lobe with ex vacuo dilatation  of the left ventricular atrium and temporal horn. Findings are  consistent with previous surgery. No associated enhancement or  hemosiderin deposition      There is slight prominence of the cortical sulci and sylvian  fissures. There is mild ventricular dilatation.  There are a few  scattered foci of abnormal signal within the basal ganglia and  subcortical white matter bilaterally.  These are compatible with  minimal small vessel ischemic changes.  These nonspecific findings  could also be produced by a demyelinating or post inflammatory  process.  The visualized skull base paranasal sinuses and orbital  structures are unremarkable. Diffusion weighted images and associated  ADC maps of the brain were unremarkable.  There is no evidence of  diffusion restriction to suggest the presence of acute infarction.  Gradient echo T2 weighted images fail to demonstrate hemosiderin  deposition or other evidence of hemorrhage.    Following intravenous injection of there is no abnormal enhancement.  There is normal contrast opacification of the dural venous sinuses.\"    Per spouse, pt had surgery in 2020 and was better with her speech for about a month.  She returned to work and a week after returning to work, her co-workers called spouse to come get pt and take her to the hospital due to the pt have extreme difficulty at work.  Spouse did this and following this, she had speech therapy.  However, per chart review, pt had speech therapy at [facility] in 2022, some time after her surgery.  Spouse reports that pt did improve some while attending speech therapy, but has been slowly declining since being discharged from .  He would like pt to further improve at this time and is especially hopeful after meeting with " physician at hospital during recent hospital stay in June of 2024.     SLP Plan:  Elana Cox is recommended to be seen for Skilled Speech Therapy 2 times per week for 8 weeks.  This was reviewed with family and they are in agreement of this.     Goals:  By discharge Elana Cox will:  LTGs:  Pt will be able to demonstrate adequate comprehension skills to be able to participate in ADLs.  Pt will be able to demonstrate adequate expressive language skills to be able to fully and effectively communicate with others to get her wants/needs met in a variety of situations.   STGs:  Pt will follow single-step commands/directives that are presented verbally with 80% accuracy and mod cues to increase receptive language skills.   Pt will point to named words in written form with 80% accuracy given moderate cues.  Patient will complete automatic speech tasks with 80% accuracy when provided with moderate visual and verbal cueing.   Patient will complete confrontational naming tasks with 80% accuracy when provided with moderate visual and verbal cueing.  Patient will perform sentence completion tasks with 80% accuracy when provided with moderate visual and verbal cueing.   Patient/family education to be provided each session.      Consider referral to: OT  Prognosis: Fair  Factors that may affect progress: None.  Pt/family agrees to the reviewed goals and Plan for therapy.    Plan of care was developed with input and agreement by the pt/family.    Subjective:   Elana Cox was seen 1-on-1 with spouse, Tod, in attendance. Pt participated well throughout the assessment..  Certification Period Start Date: 07/02/24 Certification Period End Date: 09/30/24   Referred By: Dr. Ashly Boss  Date of Onset: 2020 following anterior temporal lobectomy  Reason for Referral: Aphasia/Cognition  Chief Complaint: difficulty with language.  Prior Level of Function: Prior to surgery in 2020 was WFL.  Previous  Therapies: Pt did have ST in  to target aphasia with [facility].  Respiratory Status: WFL.  Hearing: WFL.   Vision: WFL.  No overt symptoms/signs of abuse/neglect.   Precautions: None.  Pt/family prefer to learn via demonstration, printed materials, performance, and explanation/discussion.    Pain:  Pain Assessment: 0-10   0-10 (Numeric) Pain Score: 0 - No pain    Insurance:  Reviewed: Yes  Number of Authorized Visits: Number of Authorized Treatments : Visits based on Medical Necessity, $25 co-pay  Total Number of Visits:  1   Number of Authorized Treatments : Visits based on Medical Necessity, $25 co-pay  Certification Period Start Date: 24 Certification Period End Date: 24     Objective:  Pt was administered the [place] Diagnostic Aphasia Examination (BDAE):  Fluency:         Phrase Length (Rating Scale): 2/7, 10%         Melodic Line (Rating Scale): 2/7, 10%         Grammatical Form (Rating Scale): 1/7, 0%    Conversation/Expository Speech:         Simple Social Responses: 4/7, 15%  Auditory Comprehension:        Basic Word Discrimination: 6/16, 5%        Commands: 0/10, 0%        Complex Ideational Material: 0/6, 0%  Articulation:          Articulatory Agility (Rating Scale) 3/7, 30%  Recitation:        Automized Sequences: 0/4, 0%  Repetition:        Words: 3 /5, 30 %  Naming:        Responsive Namin /10, 0 %        [place] Naming Test: 0 /15, 10 %        Special Categories: 8 /12, 25 %  Reading:        Matching Cases and Scripts: 0 /4, 0 %        Number Matchin /4, 0 %        Picture-Word Matchin /4, 0 %        Oral Word Readin /15, 15 %  Writing:        Form: 0 /14, 0 %    Many of the subtests were not completed due to lower level of ability of the pt.  She was also demonstrating mental fatigue as well as increase in nervousness by wringing hand and deep breaths in/out in attempts to calm herself.   However, assessment did indicate severe to profound global aphasia deficits that affect  verbal and written expression and auditory and reading comprehension evenly.     Treatment Provided:   Reviewed results of assessment.    Outpatient Education:   Reviewed results of today's assessment.    Reviewed plan for Therapy and Elana Cox and family were in agreement of this.

## 2024-07-03 ENCOUNTER — PATIENT OUTREACH (OUTPATIENT)
Dept: PRIMARY CARE | Facility: CLINIC | Age: 59
End: 2024-07-03
Payer: MEDICARE

## 2024-07-03 NOTE — PROGRESS NOTES
Unable to reach patient for call back after patient's follow up appointment with PCP. -Appt was not scheduled     LVM with call back number for patient to call if needed to assist with any questions or concerns patient may have.

## 2024-07-10 ENCOUNTER — TREATMENT (OUTPATIENT)
Dept: SPEECH THERAPY | Facility: CLINIC | Age: 59
End: 2024-07-10
Payer: MEDICARE

## 2024-07-10 DIAGNOSIS — R47.01 APHASIA: Primary | ICD-10-CM

## 2024-07-10 PROCEDURE — 92507 TX SP LANG VOICE COMM INDIV: CPT | Mod: GN | Performed by: SPEECH-LANGUAGE PATHOLOGIST

## 2024-07-10 ASSESSMENT — PAIN - FUNCTIONAL ASSESSMENT: PAIN_FUNCTIONAL_ASSESSMENT: 0-10

## 2024-07-10 ASSESSMENT — PAIN SCALES - GENERAL: PAINLEVEL_OUTOF10: 0 - NO PAIN

## 2024-07-10 NOTE — PROGRESS NOTES
Speech-Language Pathology  Outpatient Speech-Language Pathology Treatment    Patient Name: Elana Cox  MRN: [id number]  : 1965  Today's Date: 07/10/24  Time Calculation  Start Time: 845  Stop Time: 930  Time Calculation (min): 45 min      Current Problem:  Aphasia    SLP Assessment  Pt completed confrontational naming task and a sentence lead in worksheet. Pt completed both activities with good effort and performance improved with phonemic cues and repetition.  Pt completed a writing task where she was asked to trace her name. Pt completed this with good effort. Pt had difficulty with this task and required maximum assistance from the clinician.  Pt daughter reported that pt was having a rough day this morning before she came in.     Plan  SLP Tx Plan:  Continue with POC.   SLP Plan: Skilled SLP  SLP Frequency: 1x/week  Duration: 45 minutes    Subjective  Elana Cox was pleasant and participated well throughout the session.    General Visit Info  Number of Authorized Treatments : Visits based on Medical Necessity, $25 co-pay   Total Number of Visits : 2     Insurance Reviewed this date: yes    Pain  Pain Assessment: 0-10   0-10 (Numeric) Pain Score: 0 - No pain    Objective  By discharge Elana Cox will:  LTGs:  Pt will be able to demonstrate adequate comprehension skills to be able to participate in ADLs.  Pt will be able to demonstrate adequate expressive language skills to be able to fully and effectively communicate with others to get her wants/needs met in a variety of situations.   STGs:  Pt will follow single-step commands/directives that are presented verbally with 80% accuracy and mod cues to increase receptive language skills. Goal set 24.   NOT ADDRESSED.  Pt will point to named words in written form with 80% accuracy given moderate cues. Goal set 24.   NOT ADDRESSED.  Patient will complete automatic speech tasks with 80% accuracy when provided with moderate visual and verbal  cueing. Goal set 7/2/24.  NOT ADDRESSED.  Patient will complete confrontational naming tasks with 80% accuracy when provided with moderate visual and verbal cueing. Goal set 7/2/24.  ONGOING. Pt completed confrontational naming task with 30% accuracy independently and increased to 60% with maximum models and cueing.  Patient will perform sentence completion tasks with 80% accuracy when provided with moderate visual and verbal cueing. Goal set 7/2/24.  ONGOING. Pt completed sentence lead in worksheet with 40% accuracy independently and increased to 80% with maximum models and cueing.  Patient/family education to be provided each session. Goal set 7/2/24.   ONGOING.    Education  Education was provided to pt/family and reviewed how to carryover strategies learned in session to home.

## 2024-07-17 ENCOUNTER — TREATMENT (OUTPATIENT)
Dept: SPEECH THERAPY | Facility: CLINIC | Age: 59
End: 2024-07-17
Payer: MEDICARE

## 2024-07-17 DIAGNOSIS — R47.01 APHASIA: Primary | ICD-10-CM

## 2024-07-17 PROCEDURE — 92507 TX SP LANG VOICE COMM INDIV: CPT | Mod: GN | Performed by: SPEECH-LANGUAGE PATHOLOGIST

## 2024-07-17 ASSESSMENT — PAIN - FUNCTIONAL ASSESSMENT: PAIN_FUNCTIONAL_ASSESSMENT: 0-10

## 2024-07-17 ASSESSMENT — PAIN SCALES - GENERAL: PAINLEVEL_OUTOF10: 0 - NO PAIN

## 2024-07-17 NOTE — PROGRESS NOTES
Speech-Language Pathology  Outpatient Speech-Language Pathology Treatment    Patient Name: Elana Cox  MRN: [id number]  : 1965  Today's Date: 24  Time Calculation  Start Time: 1100  Stop Time: 1130  Time Calculation (min): 30 min      Current Problem:  Aphasia    SLP Assessment  Pt completed confrontational naming task and a sentence lead in worksheet. Pt completed both activities with good effort and performance improved with phonemic and semantic cues and repetition.  Pt brought in color/shape/word flashcards from home and completed those in session as well. Pt completed this with good effort. Pt did well with a sentence lead in from the clinician to be able to name the items on the flashcards.    Plan  SLP Tx Plan:  Continue with POC.   SLP Plan: Skilled SLP  SLP Frequency: 1x/week  Duration: 45 minutes    Subjective  Elana Cox was pleasant and participated well throughout the session.    General Visit Info  Number of Authorized Treatments : Visits based on Medical Necessity, $25 co-pay   Total Number of Visits : 3     Insurance Reviewed this date: yes    Pain  Pain Assessment: 0-10   0-10 (Numeric) Pain Score: 0 - No pain    Objective  By discharge Elana Cox will:  LTGs:  Pt will be able to demonstrate adequate comprehension skills to be able to participate in ADLs.  Pt will be able to demonstrate adequate expressive language skills to be able to fully and effectively communicate with others to get her wants/needs met in a variety of situations.   STGs:  Pt will follow single-step commands/directives that are presented verbally with 80% accuracy and mod cues to increase receptive language skills. Goal set 24.   NOT ADDRESSED.  Pt will point to named words in written form with 80% accuracy given moderate cues. Goal set 24.   NOT ADDRESSED.  Patient will complete automatic speech tasks with 80% accuracy when provided with moderate visual and verbal cueing. Goal set  7/2/24.  ONGOING. 50% with mod cues, increased to 75% with max cues.   Patient will complete confrontational naming tasks with 80% accuracy when provided with moderate visual and verbal cueing. Goal set 7/2/24.  ONGOING. Pt completed confrontational naming task with 60% accuracy when provided with maximum cueing from the clinician.  Patient will perform sentence completion tasks with 80% accuracy when provided with moderate visual and verbal cueing. Goal set 7/2/24.  ONGOING. Pt completed sentence lead in worksheet with 40% accuracy independently and increased to 70% with maximum models and cueing from clinician.  Patient/family education to be provided each session. Goal set 7/2/24.   ONGOING.    Education  Education was provided to pt/family and reviewed how to carryover strategies learned in session to home.

## 2024-07-19 ENCOUNTER — TREATMENT (OUTPATIENT)
Dept: SPEECH THERAPY | Facility: CLINIC | Age: 59
End: 2024-07-19
Payer: MEDICARE

## 2024-07-19 DIAGNOSIS — R47.01 APHASIA: Primary | ICD-10-CM

## 2024-07-19 PROCEDURE — 92507 TX SP LANG VOICE COMM INDIV: CPT | Mod: GN | Performed by: SPEECH-LANGUAGE PATHOLOGIST

## 2024-07-19 ASSESSMENT — PAIN SCALES - GENERAL: PAINLEVEL_OUTOF10: 0 - NO PAIN

## 2024-07-19 ASSESSMENT — PAIN - FUNCTIONAL ASSESSMENT: PAIN_FUNCTIONAL_ASSESSMENT: 0-10

## 2024-07-19 NOTE — PROGRESS NOTES
Speech-Language Pathology  Outpatient Speech-Language Pathology Treatment    Patient Name: Elana Cox  MRN: [id number]  : 1965  Today's Date: 24  Time Calculation  Start Time: 1130  Stop Time: 1200  Time Calculation (min): 30 min      Current Problem:  Aphasia    SLP Assessment  Pt completed confrontational naming task and a sentence lead in worksheet. Pt completed both activities with good effort and performance improved with phonemic and semantic cues and repetition.  Pt was sent home with sentence lead in worksheet to complete outside of therapy and  was informed on this. Pt and  expressed understanding.    Plan  SLP Tx Plan:  Continue with POC.   SLP Plan: Skilled SLP  SLP Frequency: 1x/week  Duration: 45 minutes    Subjective  Elana Cox was pleasant and participated well throughout the session.    General Visit Info  Number of Authorized Treatments : Visits based on Medical Necessity, $25 co-pay   Total Number of Visits : 4     Insurance Reviewed this date: yes    Pain  Pain Assessment: 0-10   0-10 (Numeric) Pain Score: 0 - No pain    Objective  By discharge Elana Cox will:  LTGs:  Pt will be able to demonstrate adequate comprehension skills to be able to participate in ADLs.  Pt will be able to demonstrate adequate expressive language skills to be able to fully and effectively communicate with others to get her wants/needs met in a variety of situations.   STGs:  Pt will follow single-step commands/directives that are presented verbally with 80% accuracy and mod cues to increase receptive language skills. Goal set 24.   NOT ADDRESSED.  Pt will point to named words in written form with 80% accuracy given moderate cues. Goal set 24.   NOT ADDRESSED.  Patient will complete automatic speech tasks with 80% accuracy when provided with moderate visual and verbal cueing. Goal set 24.  ONGOING. 55% with mod cues, increased to 70% with max cues.   Patient will complete  confrontational naming tasks with 80% accuracy when provided with moderate visual and verbal cueing. Goal set 7/2/24.  ONGOING. Pt completed confrontational naming task with 65% accuracy when provided with maximum cueing from the clinician.  Patient will perform sentence completion tasks with 80% accuracy when provided with moderate visual and verbal cueing. Goal set 7/2/24.  ONGOING. Pt completed sentence lead in worksheet with 40% accuracy independently and increased to 70% with maximum models and cueing from clinician.  Patient/family education to be provided each session. Goal set 7/2/24.   ONGOING.    Education  Education was provided to pt/family and reviewed how to carryover strategies learned in session to home.

## 2024-07-24 ENCOUNTER — TREATMENT (OUTPATIENT)
Dept: SPEECH THERAPY | Facility: CLINIC | Age: 59
End: 2024-07-24
Payer: MEDICARE

## 2024-07-24 DIAGNOSIS — R47.01 APHASIA: Primary | ICD-10-CM

## 2024-07-24 PROCEDURE — 92507 TX SP LANG VOICE COMM INDIV: CPT | Mod: GN | Performed by: SPEECH-LANGUAGE PATHOLOGIST

## 2024-07-24 ASSESSMENT — PAIN SCALES - GENERAL: PAINLEVEL_OUTOF10: 0 - NO PAIN

## 2024-07-24 ASSESSMENT — PAIN - FUNCTIONAL ASSESSMENT: PAIN_FUNCTIONAL_ASSESSMENT: 0-10

## 2024-07-24 NOTE — PROGRESS NOTES
Speech-Language Pathology  Outpatient Speech-Language Pathology Treatment    Patient Name: Elana Cox  MRN: [id number]  : 1965  Today's Date: 24  Time Calculation  Start Time: 845  Stop Time: 930  Time Calculation (min): 45 min      Current Problem:  Aphasia    SLP Assessment  Pt completed confrontational naming task and a sentence lead in worksheet. Pt completed both activities with good effort and performance improved with phonemic and semantic cues and repetition.  Pt also completed 1-step direction task and did very well on this task when she was asked to point to an item in a 2-3 item visual field, but required max cues when this was increased to 4 items.  Pt was sent home with sentence lead in worksheet to complete outside of therapy and  was informed on this. Pt and  expressed understanding.    Plan  SLP Tx Plan:  Continue with POC.   SLP Plan: Skilled SLP  SLP Frequency: 1x/week  Duration: 45 minutes    Subjective  Elana Cox was pleasant and participated well throughout the session.    General Visit Info  Number of Authorized Treatments : Visits based on Medical Necessity, $25 co-pay   Total Number of Visits : 5  Insurance Reviewed this date: yes    Pain  Pain Assessment: 0-10   0-10 (Numeric) Pain Score: 0 - No pain    Objective  By discharge Elana Cox will:  LTGs:  Pt will be able to demonstrate adequate comprehension skills to be able to participate in ADLs.  Pt will be able to demonstrate adequate expressive language skills to be able to fully and effectively communicate with others to get her wants/needs met in a variety of situations.   STGs:  Pt will follow single-step commands/directives that are presented verbally with 80% accuracy and mod cues to increase receptive language skills. Goal set 24.   ONGOING. Pointing to an object when given 2-3 objects at 90% accuracy independently, when given 4 objects she was at 20% independently and increased to 50%  with max models and cueing.  Pt will point to named words in written form with 80% accuracy given moderate cues. Goal set 7/2/24.   NOT ADDRESSED.  Patient will complete automatic speech tasks with 80% accuracy when provided with moderate visual and verbal cueing. Goal set 7/2/24.  ONGOING. 55% with mod cues, increased to 70% with max cues.   Patient will complete confrontational naming tasks with 80% accuracy when provided with moderate visual and verbal cueing. Goal set 7/2/24.  ONGOING. Pt completed confrontational naming task with 65% accuracy when provided with maximum cueing from the clinician.  Patient will perform sentence completion tasks with 80% accuracy when provided with moderate visual and verbal cueing. Goal set 7/2/24.  ONGOING. Pt completed sentence lead in worksheet with 40% accuracy independently and increased to 65% with maximum models and cueing from clinician.  Patient/family education to be provided each session. Goal set 7/2/24.   ONGOING.    Education  Education was provided to pt/family and reviewed how to carryover strategies learned in session to home.

## 2024-07-26 ENCOUNTER — TREATMENT (OUTPATIENT)
Dept: SPEECH THERAPY | Facility: CLINIC | Age: 59
End: 2024-07-26
Payer: MEDICARE

## 2024-07-26 DIAGNOSIS — R47.01 APHASIA: Primary | ICD-10-CM

## 2024-07-26 PROCEDURE — 92507 TX SP LANG VOICE COMM INDIV: CPT | Mod: GN | Performed by: SPEECH-LANGUAGE PATHOLOGIST

## 2024-07-26 ASSESSMENT — PAIN SCALES - GENERAL: PAINLEVEL_OUTOF10: 0 - NO PAIN

## 2024-07-26 ASSESSMENT — PAIN - FUNCTIONAL ASSESSMENT: PAIN_FUNCTIONAL_ASSESSMENT: 0-10

## 2024-07-26 NOTE — PROGRESS NOTES
Speech-Language Pathology  Outpatient Speech-Language Pathology Treatment    Patient Name: Elana Cox  MRN: [id number]  : 1965  Today's Date: 24  Time Calculation  Start Time: 730  Stop Time: 815  Time Calculation (min): 45 min      Current Problem:  Aphasia    SLP Assessment  Pt benefited from phonemic cuing throughout tasks.  She continues to get easily frustrated when she feels like she is not doing well.  Pt is easy to calm and redirect to task and works hard towards goals.     Plan  SLP Tx Plan:  Continue with POC.   SLP Plan: Skilled SLP  SLP Frequency: 1x/week  Duration: 45 minutes    Subjective  Elana Cox was pleasant and participated well throughout the session.    General Visit Info  Number of Authorized Treatments : Visits based on Medical Necessity, $25 co-pay   Total Number of Visits : 6  Insurance Reviewed this date: yes    Pain  Pain Assessment: 0-10   0-10 (Numeric) Pain Score: 0 - No pain    Objective  By discharge Elana Cox will:  LTGs:  Pt will be able to demonstrate adequate comprehension skills to be able to participate in ADLs.  Pt will be able to demonstrate adequate expressive language skills to be able to fully and effectively communicate with others to get her wants/needs met in a variety of situations.   STGs:  Pt will follow single-step commands/directives that are presented verbally with 80% accuracy and mod cues to increase receptive language skills. Goal set 24.   ONGOING. Pointing to an object when given 3 pictures at 100% accuracy independently, when given 4 pictures she was at 90% independently, 5 pictures at 90% independently and 6 pictures at 80% independently.  She notably had difficulty with glasses consistently.    Pt will point to named words in written form with 80% accuracy given moderate cues. Goal set 24.   NOT ADDRESSED.  Patient will complete automatic speech tasks with 80% accuracy when provided with moderate visual and verbal cueing.  Goal set 7/2/24.  ONGOING. 55% with mod cues, increased to 80% with max cues.   Patient will complete confrontational naming tasks with 80% accuracy when provided with moderate visual and verbal cueing. Goal set 7/2/24.  ONGOING. Pt completed confrontational naming task with 85% accuracy when provided with maximum cueing from the clinician.  Patient will perform sentence completion tasks with 80% accuracy when provided with moderate visual and verbal cueing. Goal set 7/2/24.  ONGOING. Pt completed sentence lead in worksheet with 20% accuracy independently and increased to 65% with maximum models and cueing from clinician.  Patient/family education to be provided each session. Goal set 7/2/24.   ONGOING.    Education  Education was provided to pt/family and reviewed how to carryover strategies learned in session to home.

## 2024-07-29 ENCOUNTER — TREATMENT (OUTPATIENT)
Dept: SPEECH THERAPY | Facility: CLINIC | Age: 59
End: 2024-07-29
Payer: MEDICARE

## 2024-07-29 DIAGNOSIS — R47.01 APHASIA: Primary | ICD-10-CM

## 2024-07-29 PROCEDURE — 92507 TX SP LANG VOICE COMM INDIV: CPT | Mod: GN | Performed by: SPEECH-LANGUAGE PATHOLOGIST

## 2024-07-29 ASSESSMENT — PAIN SCALES - GENERAL: PAINLEVEL_OUTOF10: 0 - NO PAIN

## 2024-07-29 ASSESSMENT — PAIN - FUNCTIONAL ASSESSMENT: PAIN_FUNCTIONAL_ASSESSMENT: 0-10

## 2024-07-29 NOTE — PROGRESS NOTES
Speech-Language Pathology  Outpatient Speech-Language Pathology Treatment    Patient Name: Elana Cox  MRN: [id number]  : 1965  Today's Date: 24  Time Calculation  Start Time: 0800  Stop Time: 0845  Time Calculation (min): 45 min      Current Problem:  Aphasia    SLP Assessment  Pt consistently would require additional time to generate responses and would often need repetition of directions.  Within task, she would know what to do for the first half of a repetitive task, then on the last several trials would appear to not know what to do.   Pt would be able to spontaneously name or point to an item for several times, but then later have difficulty naming it or pointing to it upon command.  She would often come close to pointing to it and then appear to second guess herself.  She would come close to saying the target with the first few sounds then trail off and appear to forget what to do. This would happen fairly consistently on the last or second to last target in a a group of six.  It did appear that pt was not aware that she would doing this.     Plan  SLP Tx Plan:  Continue with POC.   SLP Plan: Skilled SLP  SLP Frequency: 1x/week  Duration: 45 minutes    Subjective  Elana Cox was pleasant and participated well throughout the session.    General Visit Info  Number of Authorized Treatments : Visits based on Medical Necessity, $25 co-pay   Total Number of Visits : 7  Insurance Reviewed this date: yes    Pain  Pain Assessment: 0-10   0-10 (Numeric) Pain Score: 0 - No pain    Objective  By discharge Elana Cox will:  LTGs:  Pt will be able to demonstrate adequate comprehension skills to be able to participate in ADLs.  Pt will be able to demonstrate adequate expressive language skills to be able to fully and effectively communicate with others to get her wants/needs met in a variety of situations.   STGs:  Pt will follow single-step commands/directives that are presented verbally with 80%  accuracy and mod cues to increase receptive language skills. Goal set 7/2/24.   ONGOING. Pointing to an object when given 3 pictures at 100% accuracy independently, when given 4 pictures she was at 90% independently, 5 pictures at 90% independently and 6 pictures at 90% independently.      Pt will point to named words in written form with 80% accuracy given moderate cues. Goal set 7/2/24.   NOT ADDRESSED.  Patient will complete automatic speech tasks with 80% accuracy when provided with moderate visual and verbal cueing. Goal set 7/2/24.  ONGOING. 75% with mod cues, increased to 90% with max cues.   Patient will complete confrontational naming tasks with 80% accuracy when provided with moderate visual and verbal cueing. Goal set 7/2/24.  ONGOING. Pt completed confrontational naming task with 85% accuracy independently, increased to 100% with mod cues.  Patient will perform sentence completion tasks with 80% accuracy when provided with moderate visual and verbal cueing. Goal set 7/2/24.  ONGOING. Sentence lead in task at 90% with min cues.   Patient/family education to be provided each session. Goal set 7/2/24.   ONGOING.    Education  Education was provided to pt/family and reviewed how to carryover strategies learned in session to home.

## 2024-08-01 ENCOUNTER — TREATMENT (OUTPATIENT)
Dept: SPEECH THERAPY | Facility: CLINIC | Age: 59
End: 2024-08-01
Payer: MEDICARE

## 2024-08-01 DIAGNOSIS — R47.01 APHASIA: Primary | ICD-10-CM

## 2024-08-01 PROCEDURE — 92507 TX SP LANG VOICE COMM INDIV: CPT | Mod: GN | Performed by: SPEECH-LANGUAGE PATHOLOGIST

## 2024-08-01 ASSESSMENT — PAIN - FUNCTIONAL ASSESSMENT: PAIN_FUNCTIONAL_ASSESSMENT: 0-10

## 2024-08-01 ASSESSMENT — PAIN SCALES - GENERAL: PAINLEVEL_OUTOF10: 0 - NO PAIN

## 2024-08-01 NOTE — PROGRESS NOTES
Speech-Language Pathology  Outpatient Speech-Language Pathology Treatment    Patient Name: Elana Cox  MRN: [id number]  : 1965  Today's Date: 24  Time Calculation  Start Time: 0800  Stop Time: 0845  Time Calculation (min): 45 min      Current Problem:  Aphasia    SLP Assessment  Pt participated well, but had more difficulty with tasks and it was variable within task. Sometimes getting clock consistently and others, appearing to not know what it was.    She was able to participate in some minimal conversation this date.  She was able to express recent things she had done in a disjointed manner, but was able to get the gist across.   Copies of sentence completion task were provided for homework.     Plan  SLP Tx Plan:  Continue with POC.   SLP Plan: Skilled SLP  SLP Frequency: 1x/week  Duration: 45 minutes    Subjective  Elana Cox was pleasant and participated well throughout the session.    General Visit Info  Number of Authorized Treatments : Visits based on Medical Necessity, $25 co-pay   Total Number of Visits : 8  Insurance Reviewed this date: yes    Pain  Pain Assessment: 0-10   0-10 (Numeric) Pain Score: 0 - No pain    Objective  By discharge Elana Cox will:  LTGs:  Pt will be able to demonstrate adequate comprehension skills to be able to participate in ADLs.  Pt will be able to demonstrate adequate expressive language skills to be able to fully and effectively communicate with others to get her wants/needs met in a variety of situations.   STGs:  Pt will follow single-step commands/directives that are presented verbally with 80% accuracy and mod cues to increase receptive language skills. Goal set 24.   ONGOING. Pointing to an object when given 6 pictures at 60% independently and increased to 90% with min to mod cues.     Pt will point to named words in written form with 80% accuracy given moderate cues. Goal set 24.   NOT ADDRESSED.  Patient will complete automatic speech  tasks with 80% accuracy when provided with moderate visual and verbal cueing. Goal set 7/2/24.  ONGOING. 75% with mod cues, increased to 90% with max cues.   Patient will complete confrontational naming tasks with 80% accuracy when provided with moderate visual and verbal cueing. Goal set 7/2/24.  ONGOING. Pt completed confrontational naming task with 65% accuracy independently, increased to 90% with mod cues.  Patient will perform sentence completion tasks with 80% accuracy when provided with moderate visual and verbal cueing. Goal set 7/2/24.  ONGOING. Sentence lead in task at 90% with mod cues.   Patient/family education to be provided each session. Goal set 7/2/24.   ONGOING.    Education  Education was provided to pt/family and reviewed how to carryover strategies learned in session to home.

## 2024-08-05 ENCOUNTER — TREATMENT (OUTPATIENT)
Dept: SPEECH THERAPY | Facility: CLINIC | Age: 59
End: 2024-08-05
Payer: MEDICARE

## 2024-08-05 DIAGNOSIS — R47.01 APHASIA: Primary | ICD-10-CM

## 2024-08-05 PROCEDURE — 92507 TX SP LANG VOICE COMM INDIV: CPT | Mod: GN | Performed by: SPEECH-LANGUAGE PATHOLOGIST

## 2024-08-05 ASSESSMENT — PAIN SCALES - GENERAL: PAINLEVEL_OUTOF10: 0 - NO PAIN

## 2024-08-05 ASSESSMENT — PAIN - FUNCTIONAL ASSESSMENT: PAIN_FUNCTIONAL_ASSESSMENT: 0-10

## 2024-08-05 NOTE — PROGRESS NOTES
Speech-Language Pathology  Outpatient Speech-Language Pathology Treatment    Patient Name: Elana Cox  MRN: [id number]  : 1965  Today's Date: 24  Time Calculation  Start Time: 815  Stop Time: 900  Time Calculation (min): 45 min      Current Problem:  Aphasia    SLP Assessment  Pt continues to be able to move to larger fields for pointing to named items.  She was able to talk about the tax free weekend with the clinician and initiated this conversation and topic.    Reviewed progress with spouse.      Plan  SLP Tx Plan:  Continue with POC.   SLP Plan: Skilled SLP  SLP Frequency: 1x/week  Duration: 45 minutes    Subjective  Elana Cox was pleasant and participated well throughout the session.    General Visit Info  Number of Authorized Treatments : Visits based on Medical Necessity, $25 co-pay   Total Number of Visits : 9  Insurance Reviewed this date: yes    Pain  Pain Assessment: 0-10   0-10 (Numeric) Pain Score: 0 - No pain    Objective  By discharge Elana Cox will:  LTGs:  Pt will be able to demonstrate adequate comprehension skills to be able to participate in ADLs.  Pt will be able to demonstrate adequate expressive language skills to be able to fully and effectively communicate with others to get her wants/needs met in a variety of situations.   STGs:  Pt will follow single-step commands/directives that are presented verbally with 80% accuracy and mod cues to increase receptive language skills. Goal set 24.   ONGOING. Pointing to an object when given 6 pictures at 80% independently and increased to 100% with min to mod cues.   8 pictures at 75% independently and increased to 100% with mod cues.   Pt will point to named words in written form with 80% accuracy given moderate cues. Goal set 24.   NOT ADDRESSED.  Patient will complete automatic speech tasks with 80% accuracy when provided with moderate visual and verbal cueing. Goal set 24.  ONGOING. 75% with mod cues,  increased to 90% with max cues.   Patient will complete confrontational naming tasks with 80% accuracy when provided with moderate visual and verbal cueing. Goal set 7/2/24.  ONGOING. Pt completed confrontational naming task with 75% accuracy independently, increased to 100% with mod cues.  Patient will perform sentence completion tasks with 80% accuracy when provided with moderate visual and verbal cueing. Goal set 7/2/24.  ONGOING. Sentence lead in task at 90% with mod cues.   Patient/family education to be provided each session. Goal set 7/2/24.   ONGOING.    Education  Education was provided to pt/family and reviewed how to carryover strategies learned in session to home.

## 2024-08-06 ENCOUNTER — PATIENT OUTREACH (OUTPATIENT)
Dept: PRIMARY CARE | Facility: CLINIC | Age: 59
End: 2024-08-06
Payer: MEDICARE

## 2024-08-06 NOTE — PROGRESS NOTES
Successful outreach to patient's  regarding hospitalization as patient continues TCM program.   At time of outreach call the patient feels as if their condition has very slight improved with speech since hospital visit.   Patient has not had follow up yet. Has Neurology appt coming up on 8/27/24 Dr Wiggins.    expressed his frustration with watching Elana struggle daily, the length he has to wait for appts and the finical issues he is having with her money being tied up in court.   I encouraged him to keep neuro appt which he confirmed they will definitely attend. I extensive encouraged him to set up an appt with Dr Wright to go over Elana's overall health care and concerns but he declined numerous times. I told him to make sure to call the office t schedule if he changes his mind.

## 2024-08-07 ENCOUNTER — APPOINTMENT (OUTPATIENT)
Dept: SPEECH THERAPY | Facility: CLINIC | Age: 59
End: 2024-08-07
Payer: MEDICARE

## 2024-08-12 ENCOUNTER — TREATMENT (OUTPATIENT)
Dept: SPEECH THERAPY | Facility: CLINIC | Age: 59
End: 2024-08-12
Payer: MEDICARE

## 2024-08-12 DIAGNOSIS — R47.01 APHASIA: Primary | ICD-10-CM

## 2024-08-12 PROCEDURE — 92507 TX SP LANG VOICE COMM INDIV: CPT | Mod: GN | Performed by: SPEECH-LANGUAGE PATHOLOGIST

## 2024-08-12 ASSESSMENT — PAIN SCALES - GENERAL: PAINLEVEL_OUTOF10: 0 - NO PAIN

## 2024-08-12 ASSESSMENT — PAIN - FUNCTIONAL ASSESSMENT: PAIN_FUNCTIONAL_ASSESSMENT: 0-10

## 2024-08-12 NOTE — PROGRESS NOTES
Speech-Language Pathology  Outpatient Speech-Language Pathology Treatment    Patient Name: Elana Cox  MRN: [id number]  : 1965  Today's Date: 24  Time Calculation  Start Time: 815  Stop Time: 900  Time Calculation (min): 45 min      Current Problem:  Aphasia    SLP Assessment   Pt relies heavily on someone providing lead in sentences or the initial sound of a word to help her produce the targeted word.   Reviewed progress with spouse.    Pt was able to express herself in conversation with mod assistance and clinician getting the rough gist of what pt was attempting to say.     Plan  SLP Tx Plan:  Continue with POC.   SLP Plan: Skilled SLP  SLP Frequency: 1x/week  Duration: 45 minutes    Subjective  Elana Cox was pleasant and participated well throughout the session.    General Visit Info  Number of Authorized Treatments : Visits based on Medical Necessity, $25 co-pay   Total Number of Visits : 10  Insurance Reviewed this date: yes    Pain  Pain Assessment: 0-10   0-10 (Numeric) Pain Score: 0 - No pain    Objective  By discharge Elana Cox will:  LTGs:  Pt will be able to demonstrate adequate comprehension skills to be able to participate in ADLs.  Pt will be able to demonstrate adequate expressive language skills to be able to fully and effectively communicate with others to get her wants/needs met in a variety of situations.   STGs:  Pt will follow single-step commands/directives that are presented verbally with 80% accuracy and mod cues to increase receptive language skills. Goal set 24.   ONGOING. Pointing to an object when given 6 pictures at 85% independently and increased to 100% with min cues.     Pt will point to named words in written form with 80% accuracy given moderate cues. Goal set 24.   NOT ADDRESSED. Pt has not been able to point to any written words and requires max cues.  She is easily frustrated by this.   Patient will complete automatic speech tasks with 80%  accuracy when provided with moderate visual and verbal cueing. Goal set 7/2/24.  NOT ADDRESSED>  Patient will complete confrontational naming tasks with 80% accuracy when provided with moderate visual and verbal cueing. Goal set 7/2/24.  ONGOING. Pt completed confrontational naming task with 75% accuracy independently, increased to 100% with mod cues with words that had been reviewed.  With words that had not been reviewed in session, 20% independently and increased to 80% with max cues with lead in sentences and phonemic cues.  Patient will perform sentence completion tasks with 80% accuracy when provided with moderate visual and verbal cueing. Goal set 7/2/24.  ONGOING. Sentence lead in task at 90% with mod cues.   Patient/family education to be provided each session. Goal set 7/2/24.   ONGOING.    Education  Education was provided to pt/family and reviewed how to carryover strategies learned in session to home.

## 2024-08-15 ENCOUNTER — APPOINTMENT (OUTPATIENT)
Dept: SPEECH THERAPY | Facility: CLINIC | Age: 59
End: 2024-08-15
Payer: MEDICARE

## 2024-08-19 ENCOUNTER — APPOINTMENT (OUTPATIENT)
Dept: SPEECH THERAPY | Facility: CLINIC | Age: 59
End: 2024-08-19
Payer: MEDICARE

## 2024-08-22 ENCOUNTER — DOCUMENTATION (OUTPATIENT)
Dept: SPEECH THERAPY | Facility: CLINIC | Age: 59
End: 2024-08-22
Payer: MEDICARE

## 2024-08-22 NOTE — PROGRESS NOTES
Speech-Language Pathology                 Therapy Communication Note    Patient Name: Elana Cox  MRN: [id number]  Today's Date: 8/22/2024     Discipline: Speech Language Pathology    Missed Visit Reason:  Pt did not show for therapy, no call from family.  Therapy to resume at next scheduled session.

## 2024-08-26 ENCOUNTER — TREATMENT (OUTPATIENT)
Dept: SPEECH THERAPY | Facility: CLINIC | Age: 59
End: 2024-08-26
Payer: MEDICARE

## 2024-08-26 DIAGNOSIS — R47.01 APHASIA: Primary | ICD-10-CM

## 2024-08-26 PROCEDURE — 92507 TX SP LANG VOICE COMM INDIV: CPT | Mod: GN | Performed by: SPEECH-LANGUAGE PATHOLOGIST

## 2024-08-26 ASSESSMENT — PAIN SCALES - GENERAL: PAINLEVEL_OUTOF10: 0 - NO PAIN

## 2024-08-26 ASSESSMENT — PAIN - FUNCTIONAL ASSESSMENT: PAIN_FUNCTIONAL_ASSESSMENT: 0-10

## 2024-08-26 NOTE — PROGRESS NOTES
Speech-Language Pathology  Outpatient Speech-Language Pathology Treatment    Patient Name: Elana Cox  MRN: [id number]  : 1965  Today's Date: 24  Time Calculation  Start Time: 945  Stop Time: 0  Time Calculation (min): 45 min      Current Problem:  Aphasia    SLP Assessment   Pt did benefit as much from lead in sentence or phonemic cues at this time.  She did benefit from direct models from the clinician.  Pt did hold onto information some of the time.  She continues to rely heavily on repetition of directions as well as gestural supports from clinician.    Handouts targeted sentence completion were provided for homework this date.     Plan  SLP Tx Plan:  Continue with POC.   SLP Plan: Skilled SLP  SLP Frequency: 1x/week  Duration: 45 minutes    Subjective  Elana Cox was pleasant and participated well throughout the session.    General Visit Info  Number of Authorized Treatments : Visits based on Medical Necessity, $25 co-pay   Total Number of Visits : 11  Insurance Reviewed this date: yes    Pain  Pain Assessment: 0-10   0-10 (Numeric) Pain Score: 0 - No pain    Objective  By discharge Elana Cox will:  LTGs:  Pt will be able to demonstrate adequate comprehension skills to be able to participate in ADLs.  Pt will be able to demonstrate adequate expressive language skills to be able to fully and effectively communicate with others to get her wants/needs met in a variety of situations.   STGs:  Pt will follow single-step commands/directives that are presented verbally with 80% accuracy and mod cues to increase receptive language skills. Goal set 24.   ONGOING. Pointing to an object when given 6 pictures at 85% independently and increased to 100% with min cues.  Pointing to named item in a field of 8 at 75% and increased to 90% with mod cues.    Pt will point to named words in written form with 80% accuracy given moderate cues. Goal set 24.   NOT ADDRESSED. Pt has not been able to  point to any written words and requires max cues.  She is easily frustrated by this.   Patient will complete automatic speech tasks with 80% accuracy when provided with moderate visual and verbal cueing. Goal set 7/2/24.  ONGOING. MARGO at 60% and counting at 50% with mod cues.   Patient will complete confrontational naming tasks with 80% accuracy when provided with moderate visual and verbal cueing. Goal set 7/2/24.  ONGOING. Pt completed confrontational naming task with 75% accuracy independently, increased to 100% with mod cues with words that had been reviewed.    Patient will perform sentence completion tasks with 80% accuracy when provided with moderate visual and verbal cueing. Goal set 7/2/24.  ONGOING. Sentence lead in task at 90% with mod cues.   Patient/family education to be provided each session. Goal set 7/2/24.   ONGOING.    Education  Education was provided to pt/family and reviewed how to carryover strategies learned in session to home.

## 2024-08-27 ENCOUNTER — APPOINTMENT (OUTPATIENT)
Dept: NEUROLOGY | Facility: HOSPITAL | Age: 59
End: 2024-08-27
Payer: MEDICARE

## 2024-08-29 ENCOUNTER — TREATMENT (OUTPATIENT)
Dept: SPEECH THERAPY | Facility: CLINIC | Age: 59
End: 2024-08-29
Payer: MEDICARE

## 2024-08-29 DIAGNOSIS — R47.01 APHASIA: Primary | ICD-10-CM

## 2024-08-29 PROCEDURE — 92507 TX SP LANG VOICE COMM INDIV: CPT | Mod: GN | Performed by: SPEECH-LANGUAGE PATHOLOGIST

## 2024-08-29 ASSESSMENT — PAIN SCALES - GENERAL: PAINLEVEL_OUTOF10: 0 - NO PAIN

## 2024-08-29 ASSESSMENT — PAIN - FUNCTIONAL ASSESSMENT: PAIN_FUNCTIONAL_ASSESSMENT: 0-10

## 2024-08-29 NOTE — PROGRESS NOTES
Speech-Language Pathology  Outpatient Speech-Language Pathology Treatment    Patient Name: Elana Cox  MRN: [id number]  : 1965  Today's Date: 24  Time Calculation  Start Time: 915  Stop Time: 1000  Time Calculation (min): 45 min      Current Problem:  Aphasia    SLP Assessment  Spouse had told front office staff that their daughter was getting close to passing away and that pt was having a bad day.  This was noted throughout the session as pt was tearful.   Pt was able to participate in tasks at a lower level this date.     Plan  SLP Tx Plan:  Continue with POC.   SLP Plan: Skilled SLP  SLP Frequency: 1x/week  Duration: 45 minutes    Subjective  Elana Cox was pleasant and participated well throughout the session.    General Visit Info  Number of Authorized Treatments : Visits based on Medical Necessity, $25 co-pay   Total Number of Visits : 12  Insurance Reviewed this date: yes    Pain  Pain Assessment: 0-10   0-10 (Numeric) Pain Score: 0 - No pain    Objective  By discharge Elana Cox will:  LTGs:  Pt will be able to demonstrate adequate comprehension skills to be able to participate in ADLs.  Pt will be able to demonstrate adequate expressive language skills to be able to fully and effectively communicate with others to get her wants/needs met in a variety of situations.   STGs:  Pt will follow single-step commands/directives that are presented verbally with 80% accuracy and mod cues to increase receptive language skills. Goal set 24.   ONGOING. Pointing to an object when given 6 pictures at 65% independently and increased to 100% with mod cues.  Pointing to named item in a field of 8 at 75% and increased to 90% with mod cues.    Pt will point to named words in written form with 80% accuracy given moderate cues. Goal set 24.   NOT ADDRESSED. Pt has not been able to point to any written words and requires max cues.  She is easily frustrated by this.   Patient will complete  automatic speech tasks with 80% accuracy when provided with moderate visual and verbal cueing. Goal set 7/2/24.  NOT ADDRESSED.   Patient will complete confrontational naming tasks with 80% accuracy when provided with moderate visual and verbal cueing. Goal set 7/2/24.  ONGOING. Pt completed confrontational naming task with 65% accuracy independently, increased to 90% with mod cues with words that had been reviewed.    Patient will perform sentence completion tasks with 80% accuracy when provided with moderate visual and verbal cueing. Goal set 7/2/24.  ONGOING. Sentence lead in task at 80% with mod cues.   Patient/family education to be provided each session. Goal set 7/2/24.   ONGOING.    Education  Education was provided to pt/family and reviewed how to carryover strategies learned in session to home.

## 2024-09-05 ENCOUNTER — APPOINTMENT (OUTPATIENT)
Dept: SPEECH THERAPY | Facility: CLINIC | Age: 59
End: 2024-09-05
Payer: MEDICARE

## 2024-09-05 DIAGNOSIS — R47.01 APHASIA: Primary | ICD-10-CM

## 2024-09-05 PROCEDURE — 92507 TX SP LANG VOICE COMM INDIV: CPT | Mod: GN | Performed by: SPEECH-LANGUAGE PATHOLOGIST

## 2024-09-05 ASSESSMENT — PAIN - FUNCTIONAL ASSESSMENT: PAIN_FUNCTIONAL_ASSESSMENT: 0-10

## 2024-09-05 ASSESSMENT — PAIN SCALES - GENERAL: PAINLEVEL_OUTOF10: 0 - NO PAIN

## 2024-09-05 NOTE — PROGRESS NOTES
Speech-Language Pathology  Outpatient Speech-Language Pathology Treatment    Patient Name: Elana Cox  MRN: [id number]  : 1965  Today's Date: 24  Time Calculation  Start Time: 815  Stop Time: 900  Time Calculation (min): 45 min      Current Problem:  Aphasia    SLP Assessment  Pt did much better this date. She did exhibit mental fatigue during last 5-10 minutes of session and had more repetition of answers.   She did participate in some conversation about her dogs.  It was difficult for clinician to get the gist of what she was saying.     Plan  SLP Tx Plan:  Continue with POC.   SLP Plan: Skilled SLP  SLP Frequency: 1x/week  Duration: 45 minutes    Subjective  Elana Cox was pleasant and participated well throughout the session.    General Visit Info  Number of Authorized Treatments : Visits based on Medical Necessity, $25 co-pay   Total Number of Visits : 13  Insurance Reviewed this date: yes    Pain  Pain Assessment: 0-10   0-10 (Numeric) Pain Score: 0 - No pain    Objective  By discharge Elana Cox will:  LTGs:  Pt will be able to demonstrate adequate comprehension skills to be able to participate in ADLs.  Pt will be able to demonstrate adequate expressive language skills to be able to fully and effectively communicate with others to get her wants/needs met in a variety of situations.   STGs:  Pt will follow single-step commands/directives that are presented verbally with 80% accuracy and mod cues to increase receptive language skills. Goal set 24.   ONGOING. Pointing to an object when given 8 pictures at 70% independently and increased to 100% with mod cues.    Pt will point to named words in written form with 80% accuracy given moderate cues. Goal set 24.   NOT ADDRESSED. Pt has not been able to point to any written words and requires max cues.  She is easily frustrated by this.   Patient will complete automatic speech tasks with 80% accuracy when provided with moderate  visual and verbal cueing. Goal set 7/2/24.  NOT ADDRESSED.   Patient will complete confrontational naming tasks with 80% accuracy when provided with moderate visual and verbal cueing. Goal set 7/2/24.  ONGOING. Pt completed confrontational naming task with 70% accuracy independently, increased to 95% with mod cues with words that had been reviewed.    Patient will perform sentence completion tasks with 80% accuracy when provided with moderate visual and verbal cueing. Goal set 7/2/24.  ONGOING. Sentence lead in task at 75% with mod cues.   Patient/family education to be provided each session. Goal set 7/2/24.   ONGOING.    Education  Education was provided to pt/family and reviewed how to carryover strategies learned in session to home.

## 2024-09-09 ENCOUNTER — APPOINTMENT (OUTPATIENT)
Dept: SPEECH THERAPY | Facility: CLINIC | Age: 59
End: 2024-09-09
Payer: MEDICARE

## 2024-09-09 DIAGNOSIS — R47.01 APHASIA: Primary | ICD-10-CM

## 2024-09-09 PROCEDURE — 92507 TX SP LANG VOICE COMM INDIV: CPT | Mod: GN | Performed by: SPEECH-LANGUAGE PATHOLOGIST

## 2024-09-09 ASSESSMENT — PAIN SCALES - GENERAL: PAINLEVEL_OUTOF10: 0 - NO PAIN

## 2024-09-09 ASSESSMENT — PAIN - FUNCTIONAL ASSESSMENT: PAIN_FUNCTIONAL_ASSESSMENT: 0-10

## 2024-09-09 NOTE — PROGRESS NOTES
Speech-Language Pathology  Outpatient Speech-Language Pathology Treatment    Patient Name: Elana Cox  MRN: [id number]  : 1965  Today's Date: 24  Time Calculation  Start Time: 945  Stop Time: 0  Time Calculation (min): 45 min      Current Problem:  Aphasia    SLP Assessment  Pt participated well and appeared more cognitively aware this date.  Reviewed progress and she worked hard throughout the session.     Plan  SLP Tx Plan:  Continue with POC.   SLP Plan: Skilled SLP  SLP Frequency: 1x/week  Duration: 45 minutes    Subjective  Elana Cox was pleasant and participated well throughout the session.    General Visit Info  Number of Authorized Treatments : Visits based on Medical Necessity, $25 co-pay   Total Number of Visits : 14  Insurance Reviewed this date: yes    Pain  Pain Assessment: 0-10   0-10 (Numeric) Pain Score: 0 - No pain    Objective  By discharge Elana Cox will:  LTGs:  Pt will be able to demonstrate adequate comprehension skills to be able to participate in ADLs.  Pt will be able to demonstrate adequate expressive language skills to be able to fully and effectively communicate with others to get her wants/needs met in a variety of situations.   STGs:  Pt will follow single-step commands/directives that are presented verbally with 80% accuracy and mod cues to increase receptive language skills. Goal set 24.   ONGOING.  Routine directions at 90% with gestural support.  Pt will point to named words in written form with 80% accuracy given moderate cues. Goal set 24.   NOT ADDRESSED. Pt has not been able to point to any written words and requires max cues.  She is easily frustrated by this.   Patient will complete automatic speech tasks with 80% accuracy when provided with moderate visual and verbal cueing. Goal set 24.  ONGOING.  MARGO at 90% with mod cues, counting 1-21 at 95% with min to mod cues, Months of the year at 50% with max cues.   Patient will complete  confrontational naming tasks with 80% accuracy when provided with moderate visual and verbal cueing. Goal set 7/2/24.  ONGOING. Pt completed confrontational naming task with 60% accuracy independently, increased to 95% with mod cues with words that had been reviewed.    Patient will perform sentence completion tasks with 80% accuracy when provided with moderate visual and verbal cueing. Goal set 7/2/24.  ONGOING. Sentence lead in task at 75% with mod cues.   Patient/family education to be provided each session. Goal set 7/2/24.   ONGOING.    Education  Education was provided to pt/family and reviewed how to carryover strategies learned in session to home.

## 2024-09-12 ENCOUNTER — APPOINTMENT (OUTPATIENT)
Dept: SPEECH THERAPY | Facility: CLINIC | Age: 59
End: 2024-09-12
Payer: MEDICARE

## 2024-09-12 DIAGNOSIS — R47.01 APHASIA: Primary | ICD-10-CM

## 2024-09-12 PROCEDURE — 92507 TX SP LANG VOICE COMM INDIV: CPT | Mod: GN | Performed by: SPEECH-LANGUAGE PATHOLOGIST

## 2024-09-12 ASSESSMENT — PAIN - FUNCTIONAL ASSESSMENT: PAIN_FUNCTIONAL_ASSESSMENT: 0-10

## 2024-09-12 ASSESSMENT — PAIN SCALES - GENERAL: PAINLEVEL_OUTOF10: 0 - NO PAIN

## 2024-09-12 NOTE — PROGRESS NOTES
Speech-Language Pathology  Outpatient Speech-Language Pathology Treatment    Patient Name: Elana Cox  MRN: [id number]  : 1965  Today's Date: 24  Time Calculation  Start Time: 945  Stop Time: 0  Time Calculation (min): 45 min      Current Problem:  Aphasia    SLP Assessment  Pt participated well and appeared more cognitively aware this date. Pt demonstrated increased difficulty with confrontational naming task as words were not reviewed prior to the activity. She exhibited difficulties with staying focused on each card and often would look at the previous card on the table and name that word. It was beneficial to turn all other cards over to encourage attention on the one being held up. Pt benefited from frequent phonemic cues throughout the session. Reviewed progress and she worked hard throughout the session.       Plan  SLP Tx Plan:  Continue with POC.   SLP Plan: Skilled SLP  SLP Frequency: 1x/week  Duration: 45 minutes    Subjective  Elana Cox was pleasant and participated well throughout the session.    General Visit Info  Number of Authorized Treatments : Visits based on Medical Necessity, $25 co-pay   Total Number of Visits : 15  Insurance Reviewed this date: yes    Pain  Pain Assessment: 0-10   0-10 (Numeric) Pain Score: 0 - No pain    Objective  By discharge Elana Cox will:  LTGs:  Pt will be able to demonstrate adequate comprehension skills to be able to participate in ADLs.  Pt will be able to demonstrate adequate expressive language skills to be able to fully and effectively communicate with others to get her wants/needs met in a variety of situations.   STGs:  Pt will follow single-step commands/directives that are presented verbally with 80% accuracy and mod cues to increase receptive language skills. Goal set 24.   ONGOING.  Routine directions at 90% with gestural support.  Pt will point to named words in written form with 80% accuracy given moderate cues. Goal set  7/2/24.   NOT ADDRESSED. Pt has not been able to point to any written words and requires max cues.  She is easily frustrated by this.   Patient will complete automatic speech tasks with 80% accuracy when provided with moderate visual and verbal cueing. Goal set 7/2/24.  NOT ADDRESSED.  Patient will complete confrontational naming tasks with 80% accuracy when provided with moderate visual and verbal cueing. Goal set 7/2/24.  ONGOING. Pt completed confrontational naming task with 60% accuracy independently, increased to 90% with mod cues.  Patient will perform sentence completion tasks with 80% accuracy when provided with moderate visual and verbal cueing. Goal set 7/2/24.  ONGOING. Sentence lead in task at 70% independently increasing to 90% with mode cues.   Patient/family education to be provided each session. Goal set 7/2/24.   ONGOING.    Education  Education was provided to pt/family and reviewed how to carryover strategies learned in session to home.

## 2024-09-16 ENCOUNTER — APPOINTMENT (OUTPATIENT)
Dept: SPEECH THERAPY | Facility: CLINIC | Age: 59
End: 2024-09-16
Payer: MEDICARE

## 2024-09-18 ENCOUNTER — APPOINTMENT (OUTPATIENT)
Dept: SPEECH THERAPY | Facility: CLINIC | Age: 59
End: 2024-09-18
Payer: MEDICARE

## 2024-09-18 DIAGNOSIS — R47.01 APHASIA: Primary | ICD-10-CM

## 2024-09-18 PROCEDURE — 92507 TX SP LANG VOICE COMM INDIV: CPT | Mod: GN | Performed by: SPEECH-LANGUAGE PATHOLOGIST

## 2024-09-18 ASSESSMENT — PAIN - FUNCTIONAL ASSESSMENT: PAIN_FUNCTIONAL_ASSESSMENT: 0-10

## 2024-09-18 ASSESSMENT — PAIN SCALES - GENERAL: PAINLEVEL_OUTOF10: 0 - NO PAIN

## 2024-09-18 NOTE — PROGRESS NOTES
Speech-Language Pathology  Outpatient Speech-Language Pathology Treatment    Patient Name: Elana Cox  MRN: [id number]  : 1965  Today's Date: 24  Time Calculation  Start Time: 830  Stop Time: 915  Time Calculation (min): 45 min      Current Problem:  Aphasia    SLP Assessment  Pt participated well this date. Pt demonstrated increased difficulty with confrontational naming task as words were not reviewed prior to the activity. Pt benefited from frequent phonemic cues, sentence lead ins and descriptive cues throughout the activity. Pt also participated in identifying a card in a field of 8. With this activity, pt benefited from further supports of verbal and gestural cues.     Plan  SLP Tx Plan:  Continue with POC.   SLP Plan: Skilled SLP  SLP Frequency: 1x/week  Duration: 45 minutes    Subjective  Elaan Cox was pleasant and participated well throughout the session.    General Visit Info  Number of Authorized Treatments : Visits based on Medical Necessity, $25 co-pay   Total Number of Visits : 16  Insurance Reviewed this date: yes    Pain  Pain Assessment: 0-10   0-10 (Numeric) Pain Score: 0 - No pain    Objective  By discharge Elana Cox will:  LTGs:  Pt will be able to demonstrate adequate comprehension skills to be able to participate in ADLs.  Pt will be able to demonstrate adequate expressive language skills to be able to fully and effectively communicate with others to get her wants/needs met in a variety of situations.   STGs:  Pt will follow single-step commands/directives that are presented verbally with 80% accuracy and mod cues to increase receptive language skills. Goal set 24.   ONGOING.  Routine directions at 90% with gestural support.  Pt will point to named words in written form with 80% accuracy given moderate cues. Goal set 24.   NOT ADDRESSED. Pt has not been able to point to any written words and requires max cues.  She is easily frustrated by this.   Patient will  complete automatic speech tasks with 80% accuracy when provided with moderate visual and verbal cueing. Goal set 7/2/24.  NOT ADDRESSED.  Patient will complete confrontational naming tasks with 80% accuracy when provided with moderate visual and verbal cueing. Goal set 7/2/24.  ONGOING. Pt completed confrontational naming task with 65% accuracy independently, increased to 90% with mod cues.  Patient will perform sentence completion tasks with 80% accuracy when provided with moderate visual and verbal cueing. Goal set 7/2/24.  ONGOING. Sentence lead in task at 75% independently increasing to 90% with mode cues.   Patient/family education to be provided each session. Goal set 7/2/24.   ONGOING.    Education  Education was provided to pt/family and reviewed how to carryover strategies learned in session to home.

## 2024-09-25 ENCOUNTER — OFFICE VISIT (OUTPATIENT)
Dept: NEUROLOGY | Facility: HOSPITAL | Age: 59
End: 2024-09-25
Payer: MEDICARE

## 2024-09-25 VITALS
SYSTOLIC BLOOD PRESSURE: 130 MMHG | DIASTOLIC BLOOD PRESSURE: 73 MMHG | WEIGHT: 195 LBS | BODY MASS INDEX: 32.49 KG/M2 | RESPIRATION RATE: 18 BRPM | HEART RATE: 56 BPM

## 2024-09-25 DIAGNOSIS — G40.119 TEMPORAL LOBE EPILEPSY, INTRACTABLE (MULTI): Primary | ICD-10-CM

## 2024-09-25 DIAGNOSIS — R56.9 SEIZURE (MULTI): ICD-10-CM

## 2024-09-25 DIAGNOSIS — R47.01 APHASIA: ICD-10-CM

## 2024-09-25 PROCEDURE — 1036F TOBACCO NON-USER: CPT | Performed by: STUDENT IN AN ORGANIZED HEALTH CARE EDUCATION/TRAINING PROGRAM

## 2024-09-25 PROCEDURE — 99215 OFFICE O/P EST HI 40 MIN: CPT | Performed by: STUDENT IN AN ORGANIZED HEALTH CARE EDUCATION/TRAINING PROGRAM

## 2024-09-25 RX ORDER — LAMOTRIGINE 200 MG/1
200 TABLET ORAL 2 TIMES DAILY
Qty: 180 TABLET | Refills: 3 | Status: SHIPPED | OUTPATIENT
Start: 2024-09-25 | End: 2025-09-25

## 2024-09-25 RX ORDER — LEVETIRACETAM 500 MG/1
500 TABLET ORAL 2 TIMES DAILY
Qty: 180 TABLET | Refills: 3 | Status: SHIPPED | OUTPATIENT
Start: 2024-09-25 | End: 2025-09-25

## 2024-09-25 NOTE — PROGRESS NOTES
Subjective     Elana Cox is a 59 y.o. year old right-handed female who presents for Seizures    4D CLASSIFICATION  Type: EPE  Semiology: Aura -> Automotor (D)       Lateralizing signs: none      Onset: 2020      Frequency:  multiple per day prior to surgery, last in Dec 2023  EZ: Left temporal  Etiology: Hippocampal sclerosis, FCD Type 1  Comorbidities: hypothyroidism  Current AEDs: -200, -500    HPI  INTERVAL HX:  Kin was first seen in our office in 05/2024 for an initial consultation mainly for progressive language function decline since her epilepsy surgery. We have recommended an AMU evaluation to rule out seizures. Her AMU evaluation did not demonstrate any seizures.    Her language function has remained the same since then.  thinks that she has good and bad days. She has been doing speech therapy as well but with very modest improvement.     For some reason, she has been taking keppra 1000mg twice a day instead of 500mg twice a day and the  mentioned that she has become very mean because of that.         Current Outpatient Medications:     atorvastatin (Lipitor) 20 mg tablet, Take 1 tablet (20 mg) by mouth once daily., Disp: 90 tablet, Rfl: 3    levothyroxine (Synthroid, Levoxyl) 125 mcg tablet, Take 1 tablet (125 mcg) by mouth early in the morning.. Take on an empty stomach at the same time each day, either 30 to 60 minutes prior to breakfast, Disp: , Rfl:     pantoprazole (ProtoNix) 40 mg EC tablet, Take 1 tablet (40 mg) by mouth once daily., Disp: 30 tablet, Rfl: 11    sertraline (Zoloft) 50 mg tablet, Take 1 tablet (50 mg) by mouth once daily., Disp: , Rfl:     lamoTRIgine (LaMICtal) 200 mg tablet, Take 1 tablet (200 mg) by mouth 2 times a day., Disp: 180 tablet, Rfl: 3    levETIRAcetam (Keppra) 500 mg tablet, Take 1 tablet (500 mg) by mouth 2 times a day., Disp: 180 tablet, Rfl: 3  Allergies   Allergen Reactions    Penicillins Anaphylaxis     Other reaction(s):  Anaphylactic Shock    Sulfamethoxazole-Trimethoprim Unknown       Review of Systems  As per HPI, otherwise all other systems have been reviewed are negative for complaint.      Objective   /73   Pulse 56   Resp 18   Wt 88.5 kg (195 lb)   BMI 32.49 kg/m²     Neurological Exam  Physical Exam  No change from our previous exam.    Assessment/Plan   Elana Cox is a 59 y.o. year old right-handed female who presents for Seizures    4D CLASSIFICATION  Type: EPE  Semiology: Aura -> Automotor (D)       Lateralizing signs: none      Onset: 2020      Frequency:  multiple per day prior to surgery, last in Dec 2023  EZ: Left temporal  Etiology: Hippocampal sclerosis, FCD Type 1  Comorbidities: hypothyroidism  Current AEDs: -200, -500    Plan:  - Elana's AMU evaluation did not show any seizures, hence her persistent profound global aphasia is not related to intermittent seizures.   - At this point of time, the most likely explanation is post-op aphasia. Her MRI in June 2024 shows extensive left temporal lobectomy which extends ~6cm from the tip in the basal region. On the lateral surface, it does not extend beyond the junction of central sulcus and sylvian fissure in the superior temporal gyrus, however, it the encephalomalacia does extend beyond this junction in the middle and inferior temporal gyri. In addition, there is significant involvement of the white matter in the temporal stem, including the uncinate fasciculus and the external/extreme capsules. Her post-op resection definitely includes the basal temporal language area and most likely includes the visual language area in the fusiform gyrus. It likely involves atleast part of the wernicke's area as well. Even though the aphasia from basal temporal language area resection is temporary, the deficits from visual language area and Wernicke's area are less likely to be reversible.   - I explained all of this to the . She is likely to remain  dependent on him for the rest of her life.   - We will continue keppra (500mg BID) and lamotrigine at current doses.    F/U in 6 months.    Donal Wiggins MD  [facility]    The total appointment time today was 45 minutes. Time included preparing to see the patient, obtaining the history, performing a medically necessary appropriate physical examination, counseling and educating the patient/family/caregiver, ordering medications, tests and procedures, referring and communicating with other providers, independently interpreting results and communicating the results to the patient/family/caregiver, care coordination] and documenting clinical information in the medical record.

## 2024-10-02 DIAGNOSIS — R21 RASH: ICD-10-CM

## 2024-10-03 ENCOUNTER — APPOINTMENT (OUTPATIENT)
Dept: SPEECH THERAPY | Facility: CLINIC | Age: 59
End: 2024-10-03
Payer: MEDICARE

## 2024-10-03 DIAGNOSIS — R47.01 APHASIA: Primary | ICD-10-CM

## 2024-10-03 PROCEDURE — 92507 TX SP LANG VOICE COMM INDIV: CPT | Mod: GN | Performed by: SPEECH-LANGUAGE PATHOLOGIST

## 2024-10-03 RX ORDER — TRIAMCINOLONE ACETONIDE 1 MG/G
CREAM TOPICAL 2 TIMES DAILY
Qty: 30 G | Refills: 0 | OUTPATIENT
Start: 2024-10-03

## 2024-10-03 ASSESSMENT — PAIN SCALES - GENERAL: PAINLEVEL_OUTOF10: 0 - NO PAIN

## 2024-10-03 ASSESSMENT — PAIN - FUNCTIONAL ASSESSMENT: PAIN_FUNCTIONAL_ASSESSMENT: 0-10

## 2024-10-03 NOTE — PROGRESS NOTES
Speech-Language Pathology  Outpatient Speech-Language Pathology Treatment    Patient Name: Elana Cox  MRN: [id number]  : 1965  Today's Date: 10/03/24  Time Calculation  Start Time: 945  Stop Time: 1030  Time Calculation (min): 45 min      Current Problem:  Aphasia    SLP Assessment  Pt participated in confrontational naming and sentence completion tasks this date. Pt was observed to do well with this, but benefited from frequent phonemic cues when stuck. When given a field of eight picture cards and prompted to point to one, pt demonstrated more difficulties with picture cards that were scenes (I.e. closet, bathroom). With this, pt benefited from frequent repetition of directions as she frequently got distracted.     Plan  SLP Tx Plan:  Continue with POC.   SLP Plan: Skilled SLP  SLP Frequency: 1x/week  Duration: 45 minutes    Subjective  Elana Cox was pleasant and participated well throughout the session.    General Visit Info  Number of Authorized Treatments : Visits based on Medical Necessity, $25 co-pay   Total Number of Visits : 17  Insurance Reviewed this date: yes    Pain  Pain Assessment: 0-10   0-10 (Numeric) Pain Score: 0 - No pain    Objective  By discharge Elana Cox will:  LTGs:  Pt will be able to demonstrate adequate comprehension skills to be able to participate in ADLs.  Pt will be able to demonstrate adequate expressive language skills to be able to fully and effectively communicate with others to get her wants/needs met in a variety of situations.   STGs:  Pt will follow single-step commands/directives that are presented verbally with 80% accuracy and mod cues to increase receptive language skills. Goal set 24.   ONGOING.  Routine directions at 90% with gestural support.  Pt will point to named words in written form with 80% accuracy given moderate cues. Goal set 24.   NOT ADDRESSED. Pt has not been able to point to any written words and requires max cues.  She is  easily frustrated by this.   Patient will complete automatic speech tasks with 80% accuracy when provided with moderate visual and verbal cueing. Goal set 7/2/24.  NOT ADDRESSED.  Patient will complete confrontational naming tasks with 80% accuracy when provided with moderate visual and verbal cueing. Goal set 7/2/24.  ONGOING. Pt completed confrontational naming task with 65% accuracy independently, increased to 90% with mod cues.  Patient will perform sentence completion tasks with 80% accuracy when provided with moderate visual and verbal cueing. Goal set 7/2/24.  ONGOING. Sentence lead in task at 70% independently increasing to 90% with mode cues.   Patient/family education to be provided each session. Goal set 7/2/24.   ONGOING.    Education  Education was provided to pt/family and reviewed how to carryover strategies learned in session to home.

## 2024-10-07 ENCOUNTER — APPOINTMENT (OUTPATIENT)
Dept: SPEECH THERAPY | Facility: CLINIC | Age: 59
End: 2024-10-07
Payer: MEDICARE

## 2024-10-07 DIAGNOSIS — R47.01 APHASIA: Primary | ICD-10-CM

## 2024-10-07 PROCEDURE — 92507 TX SP LANG VOICE COMM INDIV: CPT | Mod: GN | Performed by: SPEECH-LANGUAGE PATHOLOGIST

## 2024-10-07 ASSESSMENT — PAIN - FUNCTIONAL ASSESSMENT: PAIN_FUNCTIONAL_ASSESSMENT: 0-10

## 2024-10-07 ASSESSMENT — PAIN SCALES - GENERAL: PAINLEVEL_OUTOF10: 0 - NO PAIN

## 2024-10-07 NOTE — PROGRESS NOTES
Speech-Language Pathology  Outpatient Speech-Language Pathology Treatment    Patient Name: Elana Cox  MRN: [id number]  : 1965  Today's Date: 10/07/24  Time Calculation  Start Time: 945  Stop Time: 0  Time Calculation (min): 45 min      Current Problem:  Aphasia    SLP Assessment  Pt participated in conversation with clinician being able to understand the gist of what she is saying 50% of the time.  Pt did rely heavily on the clinician guessing at what she was trying to say and then providing y/n responses to guide the clinician.  Even then, it was not consistently effective.    Pt participated well in tasks and is improving at independent generation of words, however she continues to benefit from verbal and phonemic cues.     Plan  SLP Tx Plan:  Continue with POC.   SLP Plan: Skilled SLP  SLP Frequency: 1x/week  Duration: 45 minutes    Subjective  Elana Cox was pleasant and participated well throughout the session.    General Visit Info  Number of Authorized Treatments : Visits based on Medical Necessity, $25 co-pay   Total Number of Visits : 18  Insurance Reviewed this date: yes    Pain  Pain Assessment: 0-10   0-10 (Numeric) Pain Score: 0 - No pain    Objective  By discharge Elana Cox will:  LTGs:  Pt will be able to demonstrate adequate comprehension skills to be able to participate in ADLs.  Pt will be able to demonstrate adequate expressive language skills to be able to fully and effectively communicate with others to get her wants/needs met in a variety of situations.   STGs:  Pt will follow single-step commands/directives that are presented verbally with 80% accuracy and mod cues to increase receptive language skills. Goal set 24.   ONGOING.  Single step at 80% with min to mod cues.   Pt will point to named words in written form with 80% accuracy given moderate cues. Goal set 24.   NOT ADDRESSED.   Patient will complete automatic speech tasks with 80% accuracy when provided  with moderate visual and verbal cueing. Goal set 7/2/24.  ONGOING. 75% with mod cues, increased to 90% with max cues.   Patient will complete confrontational naming tasks with 80% accuracy when provided with moderate visual and verbal cueing. Goal set 7/2/24.  ONGOING. Pt completed confrontational naming task with 80% accuracy with min cues, increased to 95% with mod cues.  Patient will perform sentence completion tasks with 80% accuracy when provided with moderate visual and verbal cueing. Goal set 7/2/24.  ONGOING. Sentence lead in task at 70% independently increasing to 90% with mod cues.   Patient/family education to be provided each session. Goal set 7/2/24.   ONGOING.    Education  Education was provided to pt/family and reviewed how to carryover strategies learned in session to home.

## 2024-10-10 ENCOUNTER — APPOINTMENT (OUTPATIENT)
Dept: SPEECH THERAPY | Facility: CLINIC | Age: 59
End: 2024-10-10
Payer: MEDICARE

## 2024-10-10 DIAGNOSIS — R47.01 APHASIA: Primary | ICD-10-CM

## 2024-10-10 PROCEDURE — 92507 TX SP LANG VOICE COMM INDIV: CPT | Mod: GN | Performed by: SPEECH-LANGUAGE PATHOLOGIST

## 2024-10-10 ASSESSMENT — PAIN - FUNCTIONAL ASSESSMENT: PAIN_FUNCTIONAL_ASSESSMENT: 0-10

## 2024-10-10 ASSESSMENT — PAIN SCALES - GENERAL: PAINLEVEL_OUTOF10: 0 - NO PAIN

## 2024-10-10 NOTE — PROGRESS NOTES
Speech-Language Pathology  Outpatient Speech-Language Pathology Treatment    Patient Name: Elana Cox  MRN: [id number]  : 1965  Today's Date: 10/10/24  Time Calculation  Start Time: 945  Stop Time: 1030  Time Calculation (min): 45 min      Current Problem:  Aphasia    SLP Assessment  Pt participated in word association, reading, automatic speech, and confrontational naming tasks this date. Pt demonstrated difficulties with associating written words to pictures. When the written word was presented at the same time as the picture options, pt was observed to get overwhelmed. With this, It was beneficial to have the pt read the word first and then present three possible picture options. Pt did very well with reading individual written words. When participating in automatic speech tasks, pt benefited from phonemic cues in addition to repetition of the entire task (I.e. counting over again). Pt was observed to be fatigued by the end of the session as she demonstrated difficulties with confrontational naming tasks.    Plan  SLP Tx Plan:  Continue with POC.   SLP Plan: Skilled SLP  SLP Frequency: 1x/week  Duration: 45 minutes    Subjective  Elana Cox was pleasant and participated well throughout the session.    General Visit Info  Number of Authorized Treatments : Visits based on Medical Necessity, $25 co-pay   Total Number of Visits : 19  Insurance Reviewed this date: yes    Pain  Pain Assessment: 0-10   0-10 (Numeric) Pain Score: 0 - No pain    Objective  By discharge Elana Cox will:  LTGs:  Pt will be able to demonstrate adequate comprehension skills to be able to participate in ADLs.  Pt will be able to demonstrate adequate expressive language skills to be able to fully and effectively communicate with others to get her wants/needs met in a variety of situations.   STGs:  Pt will follow single-step commands/directives that are presented verbally with 80% accuracy and mod cues to increase receptive  language skills. Goal set 7/2/24.   ONGOING.  Single step at 80% with min to mod cues.   Pt will point to named words in written form with 80% accuracy given moderate cues. Goal set 7/2/24.  ONGOING. Pt read single-words at 80% accuracy independently increasing to 100% with min cues.  Patient will complete automatic speech tasks with 80% accuracy when provided with moderate visual and verbal cueing. Goal set 7/2/24.  ONGOING. 70% with mod cues, increased to 85% with max cues.   Patient will complete confrontational naming tasks with 80% accuracy when provided with moderate visual and verbal cueing. Goal set 7/2/24.  ONGOING. Pt completed confrontational naming task with 70% accuracy with min cues, increased to 90% with mod cues.  Patient will perform sentence completion tasks with 80% accuracy when provided with moderate visual and verbal cueing. Goal set 7/2/24.  NOT ADDRESSED.  Patient/family education to be provided each session. Goal set 7/2/24.   ONGOING.    Education  Education was provided to pt/family and reviewed how to carryover strategies learned in session to home.

## 2024-10-17 ENCOUNTER — DOCUMENTATION (OUTPATIENT)
Dept: SPEECH THERAPY | Facility: CLINIC | Age: 59
End: 2024-10-17
Payer: MEDICARE

## 2024-10-17 ENCOUNTER — APPOINTMENT (OUTPATIENT)
Dept: SPEECH THERAPY | Facility: CLINIC | Age: 59
End: 2024-10-17
Payer: MEDICARE

## 2024-10-17 DIAGNOSIS — R47.01 APHASIA: Primary | ICD-10-CM

## 2024-10-17 NOTE — PROGRESS NOTES
Speech-Language Pathology                 Therapy Communication Note    Patient Name: Elana Cox  MRN: [id number]  Department:   Room: Room/bed info not found  Today's Date: 10/17/2024     Discipline: Speech Language Pathology    Missed Visit Reason:  Pt did not show for therapy, no call from family.  Therapy to resume at next scheduled session.

## 2024-10-18 ENCOUNTER — TREATMENT (OUTPATIENT)
Dept: SPEECH THERAPY | Facility: CLINIC | Age: 59
End: 2024-10-18
Payer: MEDICARE

## 2024-10-18 DIAGNOSIS — R47.01 APHASIA: Primary | ICD-10-CM

## 2024-10-18 PROCEDURE — 92507 TX SP LANG VOICE COMM INDIV: CPT | Mod: GN | Performed by: SPEECH-LANGUAGE PATHOLOGIST

## 2024-10-18 ASSESSMENT — PAIN SCALES - GENERAL: PAINLEVEL_OUTOF10: 0 - NO PAIN

## 2024-10-18 ASSESSMENT — PAIN - FUNCTIONAL ASSESSMENT: PAIN_FUNCTIONAL_ASSESSMENT: 0-10

## 2024-10-18 NOTE — PROGRESS NOTES
Speech-Language Pathology  Outpatient Speech-Language Pathology Treatment    Patient Name: Elana Cox  MRN: [id number]  : 1965  Today's Date: 10/18/24  Time Calculation  Start Time: 0800  Stop Time: 0845  Time Calculation (min): 45 min      Current Problem:  Aphasia    SLP Assessment  Pt participated well in all tasks. She was better able to participate in reading tasks this date as well as focusing on using longer words as this was very challenging for her.  Reviewed progress with spouse at end of the session.     Plan  SLP Tx Plan:  Continue with POC.   SLP Plan: Skilled SLP  SLP Frequency: 1x/week  Duration: 45 minutes    Subjective  Elana Cox was pleasant and participated well throughout the session.    General Visit Info  Number of Authorized Treatments : Visits based on Medical Necessity, $25 co-pay   Total Number of Visits : 20  Insurance Reviewed this date: yes    Pain  Pain Assessment: 0-10   0-10 (Numeric) Pain Score: 0 - No pain    Objective  By discharge Elana Cox will:  LTGs:  Pt will be able to demonstrate adequate comprehension skills to be able to participate in ADLs.  Pt will be able to demonstrate adequate expressive language skills to be able to fully and effectively communicate with others to get her wants/needs met in a variety of situations.   STGs:  Pt will follow single-step commands/directives that are presented verbally with 80% accuracy and mod cues to increase receptive language skills. Goal set 24.   ONGOING.  Single step at 90% with min to mod cues.   Pt will point to named words in written form with 80% accuracy given moderate cues. Goal set 24.  ONGOING. Pt read single-words at 80% accuracy independently increasing to 100% with min cues.  Patient will complete automatic speech tasks with 80% accuracy when provided with moderate visual and verbal cueing. Goal set 24.  ONGOING. 90% with mod cues, increased to 100% with max cues.   Patient will complete  confrontational naming tasks with 80% accuracy when provided with moderate visual and verbal cueing. Goal set 7/2/24.  ONGOING. Pt completed confrontational naming task with 70% accuracy with min cues, increased to 90% with mod cues.  Patient will perform sentence completion tasks with 80% accuracy when provided with moderate visual and verbal cueing. Goal set 7/2/24.  NOT ADDRESSED.  Patient/family education to be provided each session. Goal set 7/2/24.   ONGOING.    Education  Education was provided to pt/family and reviewed how to carryover strategies learned in session to home.

## 2024-10-21 ENCOUNTER — TREATMENT (OUTPATIENT)
Dept: SPEECH THERAPY | Facility: CLINIC | Age: 59
End: 2024-10-21
Payer: MEDICARE

## 2024-10-21 DIAGNOSIS — R47.01 APHASIA: Primary | ICD-10-CM

## 2024-10-21 PROCEDURE — 92507 TX SP LANG VOICE COMM INDIV: CPT | Mod: GN | Performed by: SPEECH-LANGUAGE PATHOLOGIST

## 2024-10-21 ASSESSMENT — PAIN - FUNCTIONAL ASSESSMENT: PAIN_FUNCTIONAL_ASSESSMENT: 0-10

## 2024-10-21 ASSESSMENT — PAIN SCALES - GENERAL: PAINLEVEL_OUTOF10: 0 - NO PAIN

## 2024-10-21 NOTE — PROGRESS NOTES
Speech-Language Pathology  Outpatient Speech-Language Pathology Treatment    Patient Name: Elana Cox  MRN: [id number]  : 1965  Today's Date: 10/21/24  Time Calculation  Start Time: 945  Stop Time: 1030  Time Calculation (min): 45 min      Current Problem:  Aphasia    SLP Assessment  Pt had more difficulty this date.  It was unclear why tasks were more challenging, but there was more background noise in the office and this could have been a contributing factor.    Overall, she continues to work hard towards goals.     Plan  SLP Tx Plan:  Continue with POC.   SLP Plan: Skilled SLP  SLP Frequency: 1x/week  Duration: 45 minutes    Subjective  Elana Cox was pleasant and participated well throughout the session.    General Visit Info  Number of Authorized Treatments : Visits based on Medical Necessity, $25 co-pay   Total Number of Visits : 21  Insurance Reviewed this date: yes    Pain  Pain Assessment: 0-10   0-10 (Numeric) Pain Score: 0 - No pain    Objective  By discharge Elana Cox will:  LTGs:  Pt will be able to demonstrate adequate comprehension skills to be able to participate in ADLs.  Pt will be able to demonstrate adequate expressive language skills to be able to fully and effectively communicate with others to get her wants/needs met in a variety of situations.   STGs:  Pt will follow single-step commands/directives that are presented verbally with 80% accuracy and mod cues to increase receptive language skills. Goal set 24.   ONGOING.  Single step at 90% with min cues.   Pt will point to named words in written form with 80% accuracy given moderate cues. Goal set 24.  ONGOING. Pt read single-words at 70% accuracy min increasing to 100% with mod cues.  Patient will complete automatic speech tasks with 80% accuracy when provided with moderate visual and verbal cueing. Goal set 24.  NOT ADDRESSED.   Patient will complete confrontational naming tasks with 80% accuracy when provided  with moderate visual and verbal cueing. Goal set 7/2/24.  ONGOING. Pt completed confrontational naming task with 70% accuracy with mod cues, increased to 90% with max cues.  Patient will perform sentence completion tasks with 80% accuracy when provided with moderate visual and verbal cueing. Goal set 7/2/24.  NOT ADDRESSED.  Patient/family education to be provided each session. Goal set 7/2/24.   ONGOING.    Education  Education was provided to pt/family and reviewed how to carryover strategies learned in session to home.

## 2024-10-23 ENCOUNTER — TREATMENT (OUTPATIENT)
Dept: SPEECH THERAPY | Facility: CLINIC | Age: 59
End: 2024-10-23
Payer: MEDICARE

## 2024-10-23 DIAGNOSIS — R47.01 APHASIA: Primary | ICD-10-CM

## 2024-10-23 PROCEDURE — 92507 TX SP LANG VOICE COMM INDIV: CPT | Mod: GN | Performed by: SPEECH-LANGUAGE PATHOLOGIST

## 2024-10-23 ASSESSMENT — PAIN - FUNCTIONAL ASSESSMENT: PAIN_FUNCTIONAL_ASSESSMENT: 0-10

## 2024-10-23 ASSESSMENT — PAIN SCALES - GENERAL: PAINLEVEL_OUTOF10: 0 - NO PAIN

## 2024-10-23 NOTE — PROGRESS NOTES
Speech-Language Pathology  Outpatient Speech-Language Pathology Treatment    Patient Name: Elana Cox  MRN: [id number]  : 1965  Today's Date: 10/23/24  Time Calculation  Start Time: 945  Stop Time: 0  Time Calculation (min): 45 min      Current Problem:  Aphasia    SLP Assessment  Elana worked hard and participated in all presented tasks this date. It was observed that she initiated speech more frequently and was easier to get the gist of what she was saying throughout the session. Pt participated in automatic speech, matching pictures to written words, and sentence completion tasks. Pt continues to benefit from phonemic cues when she is stuck on word/phrase.     Plan  SLP Tx Plan:  Continue with POC.   SLP Plan: Skilled SLP  SLP Frequency: 1x/week  Duration: 45 minutes    Subjective  Elana Cox was pleasant and participated well throughout the session.    General Visit Info  Number of Authorized Treatments : Visits based on Medical Necessity, $25 co-pay   Total Number of Visits : 22  Insurance Reviewed this date: yes    Pain  Pain Assessment: 0-10   0-10 (Numeric) Pain Score: 0 - No pain    Objective  By discharge Elana Cox will:  LTGs:  Pt will be able to demonstrate adequate comprehension skills to be able to participate in ADLs.  Pt will be able to demonstrate adequate expressive language skills to be able to fully and effectively communicate with others to get her wants/needs met in a variety of situations.   STGs:  Pt will follow single-step commands/directives that are presented verbally with 80% accuracy and mod cues to increase receptive language skills. Goal set 24.   ONGOING.  Single step at 90% with min cues.   Pt will point to named words in written form with 80% accuracy given moderate cues. Goal set 24.  ONGOING. Pt matched a picture with a written word from a field of two words at 70% accuracy independently increasing to 95% with mod cues.  Patient will complete  automatic speech tasks with 80% accuracy when provided with moderate visual and verbal cueing. Goal set 7/2/24.  ONGOING. 50% independently increasing to 95% with max cues. Pt did good  with counting tasks but demonstrated more difficulties with months of the year and days of the week.  Patient will complete confrontational naming tasks with 80% accuracy when provided with moderate visual and verbal cueing. Goal set 7/2/24.  NOT ADDRESSED.  Patient will perform sentence completion tasks with 80% accuracy when provided with moderate visual and verbal cueing. Goal set 7/2/24.  ONGOING. 75% independently increasing to 100% with of cues.   Patient/family education to be provided each session. Goal set 7/2/24.   ONGOING.    Education  Education was provided to pt/family and reviewed how to carryover strategies learned in session to home.

## 2024-10-28 ENCOUNTER — DOCUMENTATION (OUTPATIENT)
Dept: SPEECH THERAPY | Facility: CLINIC | Age: 59
End: 2024-10-28
Payer: MEDICARE

## 2024-10-28 DIAGNOSIS — R47.01 APHASIA: Primary | ICD-10-CM

## 2024-10-30 ENCOUNTER — TREATMENT (OUTPATIENT)
Dept: SPEECH THERAPY | Facility: CLINIC | Age: 59
End: 2024-10-30
Payer: MEDICARE

## 2024-10-30 DIAGNOSIS — R47.01 APHASIA: Primary | ICD-10-CM

## 2024-10-30 PROCEDURE — 92507 TX SP LANG VOICE COMM INDIV: CPT | Mod: GN | Performed by: SPEECH-LANGUAGE PATHOLOGIST

## 2024-10-30 ASSESSMENT — PAIN - FUNCTIONAL ASSESSMENT: PAIN_FUNCTIONAL_ASSESSMENT: 0-10

## 2024-10-30 ASSESSMENT — PAIN SCALES - GENERAL: PAINLEVEL_OUTOF10: 0 - NO PAIN

## 2024-11-06 ENCOUNTER — APPOINTMENT (OUTPATIENT)
Dept: SPEECH THERAPY | Facility: CLINIC | Age: 59
End: 2024-11-06
Payer: MEDICARE

## 2024-11-06 DIAGNOSIS — R47.01 APHASIA: Primary | ICD-10-CM

## 2024-11-13 ENCOUNTER — DOCUMENTATION (OUTPATIENT)
Dept: SPEECH THERAPY | Facility: CLINIC | Age: 59
End: 2024-11-13
Payer: MEDICARE

## 2024-11-13 ENCOUNTER — APPOINTMENT (OUTPATIENT)
Dept: SPEECH THERAPY | Facility: CLINIC | Age: 59
End: 2024-11-13
Payer: MEDICARE

## 2024-11-13 DIAGNOSIS — R47.01 APHASIA: Primary | ICD-10-CM

## 2024-11-13 NOTE — PROGRESS NOTES
Speech-Language Pathology                 Therapy Communication Note    Patient Name: Elana Cox  MRN: [id number]  Department:   Outpatient speech therapy  Today's Date: 11/13/2024     Discipline: Speech Language Pathology    Missed Visit Reason:  Spouse called to cancel due to waking up at the time of the appointment.  Staff to call to reschedule.

## 2024-11-20 ENCOUNTER — TREATMENT (OUTPATIENT)
Dept: SPEECH THERAPY | Facility: CLINIC | Age: 59
End: 2024-11-20
Payer: MEDICARE

## 2024-11-20 DIAGNOSIS — R47.01 APHASIA: Primary | ICD-10-CM

## 2024-11-20 PROCEDURE — 92507 TX SP LANG VOICE COMM INDIV: CPT | Mod: GN | Performed by: SPEECH-LANGUAGE PATHOLOGIST

## 2024-11-20 ASSESSMENT — PAIN - FUNCTIONAL ASSESSMENT: PAIN_FUNCTIONAL_ASSESSMENT: 0-10

## 2024-11-20 ASSESSMENT — PAIN SCALES - GENERAL: PAINLEVEL_OUTOF10: 0 - NO PAIN

## 2024-11-20 NOTE — PROGRESS NOTES
Speech-Language Pathology  Outpatient Speech-Language Pathology Treatment    Patient Name: Elana Cox  MRN: [id number]  : 1965  Today's Date: 24  Time Calculation  Start Time: 900  Stop Time: 945  Time Calculation (min): 45 min      Current Problem:  Aphasia    SLP Assessment  Elana worked hard throughout the session and participated in automatic speech tasks, matching written words to pictures, and confrontational naming tasks this date. Pt was able to count 1-10 independently, but had more difficulties with completing other automatics tasks such as repeating the days of the week and months of the year. She benefited from frequent phonemic cues and the utilization of choral speech throughout. Pt did well with reading written words, but demonstrated difficulties with matching written words to a picture. Pt benefited from holding the picture above each written word and asking if they matched.   Pt did minimally express herself throughout the session, but it was disjointed and difficult to follow.     Plan  SLP Tx Plan:  Continue with POC.   SLP Plan: Skilled SLP  SLP Frequency: 1x/week  Duration: 45 minutes    Subjective  Elana Cox was pleasant and participated well throughout the session.    General Visit Info  Number of Authorized Treatments : Visits based on Medical Necessity, $25 co-pay   Total Number of Visits : 24  Insurance Reviewed this date: yes    Pain  Pain Assessment: 0-10   0-10 (Numeric) Pain Score: 0 - No pain    Objective  By discharge Elana Cox will:  LTGs:  Pt will be able to demonstrate adequate comprehension skills to be able to participate in ADLs.  Pt will be able to demonstrate adequate expressive language skills to be able to fully and effectively communicate with others to get her wants/needs met in a variety of situations.   STGs:  Pt will follow single-step commands/directives that are presented verbally with 80% accuracy and mod cues to increase receptive  language skills. Goal set 7/2/24.   ONGOING.  Single step at 85% with min cues.   Pt will point to named words in written form with 80% accuracy given moderate cues. Goal set 7/2/24.  ONGOING. Pt matched a picture with a written word from a field of two words at 40% independently increasing to 95% with mod to max cues.  Patient will complete automatic speech tasks with 80% accuracy when provided with moderate visual and verbal cueing. Goal set 7/2/24.  ONGOING. 70% independently increasing to 95% with max cues. Pt did good  with counting task, but demonstrated more difficulties with days of the week and months of the year.  Patient will complete confrontational naming tasks with 80% accuracy when provided with moderate visual and verbal cueing. Goal set 7/2/24.  ONGOING. 70% independently increasing to 95% with mod cues.   Patient will perform sentence completion tasks with 80% accuracy when provided with moderate visual and verbal cueing. Goal set 7/2/24.  NOT ADDRESSED.   Patient/family education to be provided each session. Goal set 7/2/24.   ONGOING.    Education  Education was provided to pt/family and reviewed how to carryover strategies learned in session to home.

## 2024-11-27 ENCOUNTER — DOCUMENTATION (OUTPATIENT)
Dept: SPEECH THERAPY | Facility: CLINIC | Age: 59
End: 2024-11-27
Payer: MEDICARE

## 2024-11-27 DIAGNOSIS — R47.01 APHASIA: Primary | ICD-10-CM

## 2024-11-27 NOTE — PROGRESS NOTES
Speech-Language Pathology                 Therapy Communication Note    Patient Name: Elana Cox  MRN: [id number]  Department:   Outpatient speech therapy  Today's Date: 11/27/2024     Discipline: Speech Language Pathology    Missed Visit Reason:  Pt did not show for therapy, no call from family.  Therapy to resume at next scheduled session.

## 2024-12-06 ENCOUNTER — PATIENT OUTREACH (OUTPATIENT)
Dept: PRIMARY CARE | Facility: CLINIC | Age: 59
End: 2024-12-06
Payer: MEDICARE

## 2024-12-06 NOTE — PROGRESS NOTES
Discharge Facility: [facility]  Discharge Diagnosis: UTI  Admission Date: 12/4/2024  Discharge Date: 12/5/2024    PCP Appointment Date:none  Specialist Appointment Date:   -speech therapy 12/11/2024  -podiatry 2/4/2025  -neurology 3/25/2025    Hospital Encounter and Summary Linked: Yes  See discharge assessment below for further details    Engagement  Call Start Time: 0915 (12/6/2024  9:15 AM)    Medications  Medications reviewed with patient/caregiver?: Yes (12/6/2024  9:15 AM)  Is the patient having any side effects they believe may be caused by any medication additions or changes?: No (12/6/2024  9:15 AM)  Does the patient have all medications ordered at discharge?: Yes (12/6/2024  9:15 AM)  Care Management Interventions: No intervention needed (12/6/2024  9:15 AM)  Prescription Comments: no medication cahnges (12/6/2024  9:15 AM)  Is the patient taking all medications as directed (includes completed medication regime)?: Yes (12/6/2024  9:15 AM)  Medication Comments: see med list (12/6/2024  9:15 AM)    Appointments  Does the patient have a primary care provider?: Yes (12/6/2024  9:15 AM)  Care Management Interventions: Advised patient to make appointment (12/6/2024  9:15 AM)  Has the patient kept scheduled appointments due by today?: Yes (12/6/2024  9:15 AM)  Care Management Interventions: Advised patient to keep appointment (12/6/2024  9:15 AM)    Self Management  What is the home health agency?: none-per  (12/6/2024  9:15 AM)  Has home health visited the patient within 72 hours of discharge?: Not applicable (12/6/2024  9:15 AM)  What Durable Medical Equipment (DME) was ordered?: n/a (12/6/2024  9:15 AM)    Patient Teaching  Does the patient have access to their discharge instructions?: Yes (12/6/2024  9:15 AM)  Care Management Interventions: Reviewed instructions with patient (12/6/2024  9:15 AM)  What is the patient's perception of their health status since discharge?: Improving (12/6/2024  9:15 AM)  Is  the patient/caregiver able to teach back the hierarchy of who to call/visit for symptoms/problems? PCP, Specialist, Home Health nurse, Urgent Care, ED, 911: Yes (12/6/2024  9:15 AM)  Patient/Caregiver Education Comments: see wrap up (12/6/2024  9:15 AM)    Wrap Up  Wrap Up Additional Comments: CTS spoke with patient spouse Tod. PAtient was admitted to [facility] on 12/4/2024 with UTI. Discharged on 12/5/2024 to home with no home care needs. Tod stated that patient is doing better. Denies any chest pains, shortness of breath or fevers. Urinating ok. Spouse stated that there were no medication changes. Does not feel she needs an appt with PCP at this time. Spouse denies any questions or concerns at this time. (12/6/2024  9:15 AM)  Call End Time: 0922 (12/6/2024  9:15 AM)

## 2024-12-11 ENCOUNTER — TREATMENT (OUTPATIENT)
Dept: SPEECH THERAPY | Facility: CLINIC | Age: 59
End: 2024-12-11
Payer: MEDICARE

## 2024-12-11 DIAGNOSIS — R47.01 APHASIA: Primary | ICD-10-CM

## 2024-12-11 PROCEDURE — 92507 TX SP LANG VOICE COMM INDIV: CPT | Mod: GN | Performed by: SPEECH-LANGUAGE PATHOLOGIST

## 2024-12-11 ASSESSMENT — PAIN - FUNCTIONAL ASSESSMENT: PAIN_FUNCTIONAL_ASSESSMENT: 0-10

## 2024-12-11 ASSESSMENT — PAIN SCALES - GENERAL: PAINLEVEL_OUTOF10: 0 - NO PAIN

## 2024-12-11 NOTE — PROGRESS NOTES
Speech-Language Pathology  Outpatient Speech-Language Pathology Treatment  Discharge Summary    Patient Name: Elana Cox  MRN: [id number]  : 1965  Today's Date: 24  Time Calculation  Start Time: 815  Stop Time: 900  Time Calculation (min): 45 min      Current Problem:  Aphasia    SLP Assessment  Pt was able to participate in conversation with clinician getting the gist of what she is saying some of the time.  This is still very challenging for the pt and she gets frustrated at times.   Reviewed how to continue progress at home.  Pt is at max potential at this time and is recommended for discharge.  This was reviewed with pt and spouse.  Reviewed that they are welcome to reach out for further supports at any time.     Plan  SLP Tx Plan:  Discharge this date.     Subjective  Elana Cox was pleasant and participated well throughout the session.    General Visit Info  Number of Authorized Treatments : Visits based on Medical Necessity, $25 co-pay   Total Number of Visits : 25  Insurance Reviewed this date: yes    Pain  Pain Assessment: 0-10   0-10 (Numeric) Pain Score: 0 - No pain    Objective  By discharge Elana Cox will:  LTGs:  Pt will be able to demonstrate adequate comprehension skills to be able to participate in ADLs.  Pt will be able to demonstrate adequate expressive language skills to be able to fully and effectively communicate with others to get her wants/needs met in a variety of situations.   STGs:  Pt will follow single-step commands/directives that are presented verbally with 80% accuracy and mod cues to increase receptive language skills. Goal set 24.   GOAL PARTIALLY MET.  Single step at 85% with min cues.   Pt will point to named words in written form with 80% accuracy given moderate cues. Goal set 24.  GOAL PARTIALLY MET. 30% with mod cues.  Patient will complete automatic speech tasks with 80% accuracy when provided with moderate visual and verbal cueing. Goal set  7/2/24.  GOAL PARTIALLY MET. 70% independently increasing to 95% with max cues.   Patient will complete confrontational naming tasks with 80% accuracy when provided with moderate visual and verbal cueing. Goal set 7/2/24.  GOAL PARTIALLY MET. 70% independently increasing to 95% with mod cues.   Patient will perform sentence completion tasks with 80% accuracy when provided with moderate visual and verbal cueing. Goal set 7/2/24.  GOAL PARTIALLY MET.   Patient/family education to be provided each session. Goal set 7/2/24.   GOAL MET.    Education  Education was provided to pt/family and reviewed how to carryover strategies learned in session to home.

## 2024-12-19 ENCOUNTER — PATIENT OUTREACH (OUTPATIENT)
Dept: PRIMARY CARE | Facility: CLINIC | Age: 59
End: 2024-12-19
Payer: MEDICARE

## 2024-12-24 ENCOUNTER — TELEPHONE (OUTPATIENT)
Dept: CARDIOLOGY | Facility: CLINIC | Age: 59
End: 2024-12-24
Payer: MEDICARE

## 2024-12-24 DIAGNOSIS — B00.1 COLD SORE: Primary | ICD-10-CM

## 2024-12-24 RX ORDER — VALACYCLOVIR HYDROCHLORIDE 1 G/1
1000 TABLET, FILM COATED ORAL DAILY
Qty: 30 TABLET | Refills: 1 | Status: SHIPPED | OUTPATIENT
Start: 2024-12-24 | End: 2025-02-22

## 2025-01-17 ENCOUNTER — PATIENT OUTREACH (OUTPATIENT)
Dept: PRIMARY CARE | Facility: CLINIC | Age: 60
End: 2025-01-17
Payer: MEDICARE

## 2025-01-21 DIAGNOSIS — B00.1 COLD SORE: ICD-10-CM

## 2025-01-22 RX ORDER — VALACYCLOVIR HYDROCHLORIDE 1 G/1
1000 TABLET, FILM COATED ORAL DAILY
Qty: 30 TABLET | Refills: 1 | Status: SHIPPED | OUTPATIENT
Start: 2025-01-22 | End: 2025-03-23

## 2025-01-22 RX ORDER — VALACYCLOVIR HYDROCHLORIDE 1 G/1
1000 TABLET, FILM COATED ORAL DAILY
Qty: 90 TABLET | Refills: 1 | Status: SHIPPED | OUTPATIENT
Start: 2025-01-22 | End: 2025-07-21

## 2025-02-18 ENCOUNTER — PATIENT OUTREACH (OUTPATIENT)
Dept: PRIMARY CARE | Facility: CLINIC | Age: 60
End: 2025-02-18
Payer: MEDICARE

## 2025-02-27 ENCOUNTER — APPOINTMENT (OUTPATIENT)
Dept: PRIMARY CARE | Facility: CLINIC | Age: 60
End: 2025-02-27
Payer: MEDICARE

## 2025-02-27 VITALS
HEART RATE: 62 BPM | SYSTOLIC BLOOD PRESSURE: 118 MMHG | DIASTOLIC BLOOD PRESSURE: 72 MMHG | TEMPERATURE: 97.8 F | BODY MASS INDEX: 36.49 KG/M2 | HEIGHT: 65 IN | WEIGHT: 219 LBS

## 2025-02-27 DIAGNOSIS — E03.9 HYPOTHYROIDISM, UNSPECIFIED TYPE: ICD-10-CM

## 2025-02-27 DIAGNOSIS — K21.9 GASTROESOPHAGEAL REFLUX DISEASE, UNSPECIFIED WHETHER ESOPHAGITIS PRESENT: ICD-10-CM

## 2025-02-27 DIAGNOSIS — F41.9 ANXIETY: ICD-10-CM

## 2025-02-27 DIAGNOSIS — L25.9 CONTACT DERMATITIS, UNSPECIFIED CONTACT DERMATITIS TYPE, UNSPECIFIED TRIGGER: Primary | ICD-10-CM

## 2025-02-27 DIAGNOSIS — E66.9 OBESITY, UNSPECIFIED: ICD-10-CM

## 2025-02-27 PROCEDURE — 99214 OFFICE O/P EST MOD 30 MIN: CPT | Performed by: FAMILY MEDICINE

## 2025-02-27 PROCEDURE — 3008F BODY MASS INDEX DOCD: CPT | Performed by: FAMILY MEDICINE

## 2025-02-27 RX ORDER — SERTRALINE HYDROCHLORIDE 100 MG/1
100 TABLET, FILM COATED ORAL DAILY
Qty: 90 TABLET | Refills: 1 | Status: SHIPPED | OUTPATIENT
Start: 2025-02-27 | End: 2026-02-27

## 2025-02-27 RX ORDER — CLOBETASOL PROPIONATE 0.5 MG/G
OINTMENT TOPICAL 2 TIMES DAILY
Qty: 30 G | Refills: 1 | Status: SHIPPED | OUTPATIENT
Start: 2025-02-27 | End: 2025-10-25

## 2025-02-27 RX ORDER — PREDNISONE 50 MG/1
50 TABLET ORAL DAILY
Qty: 5 TABLET | Refills: 0 | Status: SHIPPED | OUTPATIENT
Start: 2025-02-27 | End: 2025-03-04

## 2025-02-27 RX ORDER — PANTOPRAZOLE SODIUM 40 MG/1
40 TABLET, DELAYED RELEASE ORAL DAILY
Qty: 90 TABLET | Refills: 1 | Status: SHIPPED | OUTPATIENT
Start: 2025-02-27 | End: 2026-02-27

## 2025-02-27 ASSESSMENT — PAIN SCALES - GENERAL: PAINLEVEL_OUTOF10: 0-NO PAIN

## 2025-02-27 NOTE — PROGRESS NOTES
"    /72 (BP Location: Left arm, Patient Position: Sitting)   Pulse 62   Temp 36.6 °C (97.8 °F)   Ht 1.651 m (5' 5\")   Wt 99.3 kg (219 lb)   BMI 36.44 kg/m²     History reviewed. No pertinent past medical history.    Patient Active Problem List   Diagnosis    Gastroesophageal reflux disease    Allergic contact dermatitis    Fatigue    Hyperglycemia    Hyperlipidemia    Hypothyroidism    Migraine headache    Mood disorder (CMS-HCC)    Obesity    Paresthesia    Peripheral nerve disease    Breakthrough seizure (Multi)    Vitamin D deficiency    Acute upper respiratory infection    Aphasia    Impairment of balance    Chronic obstructive pulmonary disease (Multi)    Chronic otitis externa    Community acquired pneumonia    Cough    Herpes labialis    High thyroxine level    Memory impairment    Insomnia    Mental status, decreased    Metabolic encephalopathy    Nicotine dependence    Open wound of forearm    Pain of left upper extremity    Rash    Recurrent major depression (CMS-HCC)    Asthma    Sleep apnea    Staring episodes    Tinnitus    Wheezing    Epilepsy    Intractable chronic migraine without aura    Intractable focal epilepsy (Multi)    Seizure disorder (Multi)    Partial epilepsy with impairment of consciousness (Multi)    Seizure (Multi)    Temporal lobe epilepsy (Multi)    Altered mental status    Urinary tract bacterial infections       Current Outpatient Medications   Medication Sig Dispense Refill    atorvastatin (Lipitor) 20 mg tablet Take 1 tablet (20 mg) by mouth once daily. 90 tablet 3    lamoTRIgine (LaMICtal) 200 mg tablet Take 1 tablet (200 mg) by mouth 2 times a day. 180 tablet 3    levETIRAcetam (Keppra) 500 mg tablet Take 1 tablet (500 mg) by mouth 2 times a day. 180 tablet 3    levothyroxine (Synthroid, Levoxyl) 125 mcg tablet Take 1 tablet (125 mcg) by mouth early in the morning.. Take on an empty stomach at the same time each day, either 30 to 60 minutes prior to breakfast      " pantoprazole (ProtoNix) 40 mg EC tablet Take 1 tablet (40 mg) by mouth once daily. 30 tablet 11    sertraline (Zoloft) 50 mg tablet Take 1 tablet (50 mg) by mouth once daily.      valACYclovir (Valtrex) 1 gram tablet TAKE 1 TABLET (1,000 MG) BY MOUTH ONCE DAILY. 90 tablet 1    valACYclovir (Valtrex) 1 gram tablet Take 1 tablet (1,000 mg) by mouth once daily. (Patient not taking: Reported on 2/27/2025) 30 tablet 1     No current facility-administered medications for this visit.       CC/HPI/ASSESSMENT/PLAN    CC rash and medications    HPI patient here with  and son who are primary caregivers and history providers.  Patient with a history of brain surgery and aphasia.   notes patient developed a rash a few days ago on her upper chest area within hours of using a new perfume.  They have used over-the-counter cortisone with no relief.  Patient takes pantoprazole daily for acid reflux and requesting refill.  Patient is having more emotional lability.   notes that patient will have multiple crying spells throughout the day.  She remains on Zoloft 50 mg.  We discussed increasing Zoloft to 100 mg daily.  She is also taking Synthroid, she will need blood work to check her thyroid levels.    Exam calm neck supple lungs CTA CV RRR skin exam reveals macular rash on the upper chest.  No pustules noted.  No evidence of cellulitis.  Rash is nontender.    A/P 1.  Contact dermatitis.  Prednisone orally and topically ordered.  #2 hypothyroidism, blood work ordered.  #3 anxiety will increase Zoloft 100 mg daily #4 GERD chronic stable pantoprazole refilled    There are no diagnoses linked to this encounter.

## 2025-02-28 PROBLEM — L25.9 CONTACT DERMATITIS: Status: ACTIVE | Noted: 2023-11-01

## 2025-02-28 PROBLEM — F41.9 ANXIETY: Status: ACTIVE | Noted: 2025-02-28

## 2025-03-01 LAB — TSH SERPL-ACNC: 1.87 MIU/L (ref 0.4–4.5)

## 2025-03-03 ENCOUNTER — TELEPHONE (OUTPATIENT)
Dept: PRIMARY CARE | Facility: CLINIC | Age: 60
End: 2025-03-03
Payer: MEDICARE

## 2025-03-03 NOTE — TELEPHONE ENCOUNTER
----- Message from Keith Wright sent at 3/3/2025  9:44 AM EST -----  Thyroid level good continue current dose

## 2025-03-10 ENCOUNTER — OFFICE VISIT (OUTPATIENT)
Dept: NEUROLOGY | Facility: HOSPITAL | Age: 60
End: 2025-03-10
Payer: MEDICARE

## 2025-03-10 VITALS — SYSTOLIC BLOOD PRESSURE: 106 MMHG | TEMPERATURE: 93.3 F | HEART RATE: 61 BPM | DIASTOLIC BLOOD PRESSURE: 63 MMHG

## 2025-03-10 DIAGNOSIS — G40.119 TEMPORAL LOBE EPILEPSY, INTRACTABLE (MULTI): Primary | ICD-10-CM

## 2025-03-10 DIAGNOSIS — R47.01 APHASIA: ICD-10-CM

## 2025-03-10 DIAGNOSIS — L98.9 SKIN LESION: ICD-10-CM

## 2025-03-10 PROCEDURE — 99214 OFFICE O/P EST MOD 30 MIN: CPT | Performed by: STUDENT IN AN ORGANIZED HEALTH CARE EDUCATION/TRAINING PROGRAM

## 2025-03-10 PROCEDURE — 99214 OFFICE O/P EST MOD 30 MIN: CPT | Mod: GC | Performed by: STUDENT IN AN ORGANIZED HEALTH CARE EDUCATION/TRAINING PROGRAM

## 2025-03-10 PROCEDURE — G2211 COMPLEX E/M VISIT ADD ON: HCPCS | Performed by: STUDENT IN AN ORGANIZED HEALTH CARE EDUCATION/TRAINING PROGRAM

## 2025-03-10 NOTE — PATIENT INSTRUCTIONS
Thank you for coming to follow up in clinic today. We are happy to hear you have had no new seizures. We recommend the followin) OK to start to wean off of Keppra for now given possible effect on mood. If a seizure re-occurs, please restart the medication, and if it lasts >2 minutes, please call 911. To wean, please take 1/2 tab (250mg) twice per day for 2 wks, then can come off.     2) Psychiatry and psychology consult for medication management and therapy after temporal lobectomy    3) STAT dermatology consult for growing, irregularly pigmented/contoured, raised lesion on right posterior upper arm.     4) Resources provided regarding Epilepsy associations.

## 2025-03-10 NOTE — PROGRESS NOTES
Subjective     Elana Cox is a 59 y.o. 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 Seizures prior to her surgery were daily dialeptic seizures with automatisms. Since surgery she has had progressive cognitive decline mainly affecting her language function. Mild postop aphasia can occur after dominant temporal lobectomy however is usually transient and improves.   She was first seen in our office in 05/2024 for an initial consultation mainly for progressive language function decline since her epilepsy surgery.Her language function has remained the same since then. She has been doing speech therapy as well but with very modest improvement.   She was referred her to the [facility] to rule out electrographic seizures as a cause for her progressive language decline. EEG showed left temporal slowing without seizures and she did not have any clinical episodes.      Interval History:  /son/patient report no seizures or starring spells. However report that her mood has significantly worsened and they find her crying multiple times per day and she can become very angry when approached during these episodes. This mood dysregulation is their largest concern. No improvement in aphasia. Compliant on Lamotrigine 200-200 and Keppra 500-500      Current Outpatient Medications:     atorvastatin (Lipitor) 20 mg tablet, Take 1 tablet (20 mg) by mouth once daily., Disp: 90 tablet, Rfl: 3    clobetasol (Temovate) 0.05 % ointment, Apply topically 2  times a day., Disp: 30 g, Rfl: 1    lamoTRIgine (LaMICtal) 200 mg tablet, Take 1 tablet (200 mg) by mouth 2 times a day., Disp: 180 tablet, Rfl: 3    levETIRAcetam (Keppra) 500 mg tablet, Take 1 tablet (500 mg) by mouth 2 times a day., Disp: 180 tablet, Rfl: 3    levothyroxine (Synthroid, Levoxyl) 125 mcg tablet, Take 1 tablet (125 mcg) by mouth early in the morning.. Take on an empty stomach at the same time each day, either 30 to 60 minutes prior to breakfast, Disp: , Rfl:     pantoprazole (ProtoNix) 40 mg EC tablet, Take 1 tablet (40 mg) by mouth once daily., Disp: 90 tablet, Rfl: 1    sertraline (Zoloft) 100 mg tablet, Take 1 tablet (100 mg) by mouth once daily., Disp: 90 tablet, Rfl: 1    valACYclovir (Valtrex) 1 gram tablet, TAKE 1 TABLET (1,000 MG) BY MOUTH ONCE DAILY., Disp: 90 tablet, Rfl: 1    valACYclovir (Valtrex) 1 gram tablet, Take 1 tablet (1,000 mg) by mouth once daily. (Patient not taking: Reported on 2/27/2025), Disp: 30 tablet, Rfl: 1  Allergies   Allergen Reactions    Penicillins Anaphylaxis     Other reaction(s): Anaphylactic Shock    Sulfamethoxazole-Trimethoprim Unknown       Review of Systems  As per HPI, otherwise all other systems have been reviewed are negative for complaint.      Objective   /63   Pulse 61   Temp 34.1 °C (93.3 °F)     Neurological Exam  Physical Exam  Significant expressive>receptive aphasia, able to follow only simple commands and say simple 1-2 words replies.     Assessment/Plan   Elana Cox is a 59 y.o. 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 Seizures prior to her surgery were daily dialeptic seizures with automatisms, however has since resolved. Post-operative aphasia is significant (expressive>Receptive) and her mood dysregulation (uncontrolled crying/anger) remains uncontrolled and possible a side effect of keppra. Discussed safety of  weaning Keppra given reassuring recent EMU stay.     4D CLASSIFICATION  Type: EPE  Semiology: Aura -> Automotor (D)       Lateralizing signs: none      Onset: 2020      Frequency:  multiple per day prior to surgery, last in Dec 2023  EZ: Left temporal  Etiology: Hippocampal sclerosis, FCD Type 1  Comorbidities: hypothyroidism  Current AEDs: -200, -500    Plan:  1) OK to start to wean off of Keppra for now given possible effect on mood. If a seizure re-occurs, please restart the medication, and if it lasts >2 minutes, please call 911. To wean, please take 1/2 tab (250mg) twice per day for 2 wks, then can come off.     2) Psychiatry and psychology consult for medication management and therapy after temporal lobectomy    3) STAT dermatology consult for growing, irregularly pigmented/contoured, centrally-raised lesion on right posterior upper arm.     4) Resources provided regarding Epilepsy associations.   5) F/U in 3-4 months.    Augustine Payan DO  Clinical Epilepsy Fellow

## 2025-03-25 ENCOUNTER — APPOINTMENT (OUTPATIENT)
Dept: NEUROLOGY | Facility: HOSPITAL | Age: 60
End: 2025-03-25
Payer: MEDICARE

## 2025-03-27 ENCOUNTER — APPOINTMENT (OUTPATIENT)
Dept: DERMATOLOGY | Facility: CLINIC | Age: 60
End: 2025-03-27
Payer: MEDICARE

## 2025-03-27 DIAGNOSIS — Z12.83 SCREENING EXAM FOR SKIN CANCER: ICD-10-CM

## 2025-03-27 DIAGNOSIS — D22.9 MELANOCYTIC NEVUS, UNSPECIFIED LOCATION: ICD-10-CM

## 2025-03-27 DIAGNOSIS — L57.9 SKIN CHANGES DUE TO CHRONIC EXPOSURE TO NONIONIZING RADIATION: ICD-10-CM

## 2025-03-27 DIAGNOSIS — L81.4 LENTIGO: ICD-10-CM

## 2025-03-27 DIAGNOSIS — D48.5 NEOPLASM OF UNCERTAIN BEHAVIOR OF SKIN: Primary | ICD-10-CM

## 2025-03-27 PROCEDURE — 99203 OFFICE O/P NEW LOW 30 MIN: CPT | Performed by: STUDENT IN AN ORGANIZED HEALTH CARE EDUCATION/TRAINING PROGRAM

## 2025-03-27 PROCEDURE — 11102 TANGNTL BX SKIN SINGLE LES: CPT | Performed by: STUDENT IN AN ORGANIZED HEALTH CARE EDUCATION/TRAINING PROGRAM

## 2025-03-27 NOTE — PROGRESS NOTES
Subjective     Elana Cox is a 59 y.o. female who presents for the following: Suspicious Skin Lesion (Patient is concerned with lesion on right posterior upper arm which has been present for several years. Hx of left temporal epilepsy.).     Review of Systems:  No other skin or systemic complaints other than what is documented elsewhere in the note.    The following portions of the chart were reviewed this encounter and updated as appropriate:          Skin Cancer History  No skin cancer on file.      Specialty Problems          Dermatology Problems    Open wound of forearm     Formatting of this note might be different from the original. LT Open wound of forearm, without mention of complication Formatting of this note might be different from the original. Formatting of this note might be different from the original. LT Open wound of forearm, without mention of complication         Contact dermatitis    Rash        Objective   Well appearing patient in no apparent distress; mood and affect are within normal limits.    A focused skin examination was performed. All findings within normal limits unless otherwise noted below.    Assessment/Plan   1. Neoplasm of uncertain behavior of skin  Right Upper Arm - Posterior  2.3x2.0 cm irregular light and dark brown patch with 1 cm central scaly nodule     NO APPRECIABLE LAD IN RIGHT AXILLA    DISCUSSED LIKELY DIAGNOSIS OF MELANOMA AND NEXT STEPS          Lesion biopsy  Type of biopsy: tangential    Informed consent: discussed and consent obtained    Timeout: patient name, date of birth, surgical site, and procedure verified    Procedure prep:  Patient was prepped and draped  Anesthesia: the lesion was anesthetized in a standard fashion    Anesthetic:  1% lidocaine w/ epinephrine 1-100,000 local infiltration  Instrument used: DermaBlade    Hemostasis achieved with: aluminum chloride    Outcome: patient tolerated procedure well    Post-procedure details: sterile dressing  applied and wound care instructions given    Dressing type: petrolatum and bandage      Staff Communication: Dermatology Local Anesthesia: 1 % Lidocaine / Epinephrine - Amount: 3 ml    Specimen 1 - Dermatopathology- DERM LAB  Differential Diagnosis: r/o melanoma  Check Margins Yes/No?:    Comments:    Dermpath Lab: Routine Histopathology (formalin-fixed tissue)    Concerning lesion found on exam. DDX for lesion includes: MALIGNANT MELANOMA. The need for biopsy to aid in diagnosis was discussed and recommended. Risks and benefits of biopsy were reviewed. See procedure note.    2. Skin changes due to chronic exposure to nonionizing radiation  Mottled pigmentation, telangiectasias and brown reticular macules in sun exposed areas on the face, extremities, trunk    The risk of chronic, cumulative sun damage and risk of development of skin cancer was reviewed with the patient today. Discussed important of sun protection with sun protective clothing and/or broad spectrum sunscreen spf 30 or above.  Warning signs of skin cancer were reviewed. Patient to contact office should they notice any new or changing pre-existing skin lesion.    Related Procedures  Follow Up In Dermatology - Established Patient    3. Melanocytic nevus, unspecified location  Benign to slightly atypical appearing brown pigmented macules and papules    Clinically benign appearing nevi, reassurance provided to the patient today.    4. Lentigo  Scattered tan macules in sun-exposed areas.    Benign nature of these skin lesions reviewed and relation to sun exposure discussed. Reassurance provided. Reviewed warning signs of skin cancer.    5. Screening exam for skin cancer

## 2025-03-31 LAB
LABORATORY COMMENT REPORT: NORMAL
PATH REPORT.FINAL DX SPEC: NORMAL
PATH REPORT.GROSS SPEC: NORMAL
PATH REPORT.MICROSCOPIC SPEC OTHER STN: NORMAL
PATH REPORT.RELEVANT HX SPEC: NORMAL
PATH REPORT.TOTAL CANCER: NORMAL
PATHOLOGY SYNOPTIC REPORT: NORMAL

## 2025-04-02 DIAGNOSIS — C43.9 MELANOMA OF SKIN (MULTI): ICD-10-CM

## 2025-04-03 DIAGNOSIS — C43.9 MELANOMA OF SKIN (MULTI): Primary | ICD-10-CM

## 2025-04-03 NOTE — TUMOR BOARD NOTE
General Patient Information  Name:  Elana Cox  Evaluation #:  1  Conference Date:  04/07/25  YOB: 1965  MRN:  [id number]  Program Physician(s):  Caleb Disla  Referring Physician(s):  Armen Begum      Summary   Stage:  cIIA ([id number])   Melanoma 5 year survival: 94%    Assessment:  Right upper arm with a non-ulcerated superficial spreading melanoma, Breslow depth 2.2 mm.    Recommendation:  WLE with 2 cm margins and SLNB.    Review Multidisciplinary Cutaneous Oncology Conference recommendation with patient.  Continue routine follow up and total body skin exams with Shira Haynes.    Follow Up:  Armen Begum      History and Physical Exam  Dermatologic History:   59 y.o. female with a biopsy of the right upper arm on 03/27/25 showing a non-ulcerated melanoma, superficial spreading type, Breslow: 2.2 mm.     Appointment with Dr. Castaneda scheduled for 04/08/25.    Past Medical History:  No past medical history on file.    Family History of DNS/MM:  Unknown    Skin:  Right Upper Arm - Posterior  2.3x2.0 cm irregular light and dark brown patch with 1 cm central scaly nodule     Lymphatic:  NO APPRECIABLE LAD IN RIGHT AXILLA       Pathology  Dermatopathology- DERM LAB: [id number]   Collected 3/27/2025    Component    FINAL DIAGNOSIS   SKIN, RIGHT UPPER ARM - POSTERIOR, SHAVE BIOPSY:  INVASIVE MALIGNANT MELANOMA, SUPERFICIAL SPREADING SUBTYPE, EXTENDING TO A DEPTH OF 2.2 MM.                                                         ** Electronically signed out by SHAYLEE PONCE MD **      Electronically signed by Shaylee Ponce MD on 3/31/2025 at 1228     SPECIMEN   Procedure  Biopsy, shave   Specimen Laterality  Right   TUMOR   Tumor Site  Skin of upper limb and shoulder: Right upper arm-posterior          Histologic Type  Superficial spreading melanoma (low-cumulative sun damage (CSD) melanoma)   Maximum Tumor (Breslow) Thickness (Millimeters)  2.2 mm    Ulceration  Not identified   Anatomic (Jeremie) Level  IV (melanoma invades reticular dermis)   Mitotic Rate  1 mitoses per mm2   Microsatellite(s)  Cannot be determined: Shave specimen     Lymphovascular Invasion  Not identified   Neurotropism  Not identified   Tumor-Infiltrating Lymphocytes  Present, nonbrisk   Tumor Regression  Not identified   MARGINS     Margin Status for Invasive Melanoma  All margins negative for invasive melanoma   Margin Status for Melanoma in situ  Melanoma in situ present at margin   Margin(s) Involved by Melanoma in Situ  Peripheral: Extends to the peripheral margins.     PATHOLOGIC STAGE CLASSIFICATION (pTNM, AJCC 8th Edition)     pT Category  pT3a   Comment(s)  Tumor block is blocks A1-A3.       Radiology      Clinical Images  03/27/25

## 2025-04-07 ENCOUNTER — TUMOR BOARD CONFERENCE (OUTPATIENT)
Dept: HEMATOLOGY/ONCOLOGY | Facility: HOSPITAL | Age: 60
End: 2025-04-07
Payer: MEDICARE

## 2025-04-07 NOTE — H&P (VIEW-ONLY)
Assessment and Plan:  Elana Cox is a 59 y.o. female referred by Shira Haynes with a newly diagnosed melanoma of the right upper arm that is 2.2 mm thick, cT3a. After discussing the risks and benefits of wide local excision with sentinel lymph node biopsy the patient understands and would like to proceed. We will look for OR time at [place] in the coming weeks. I believe this will close primarily with a 2 cm margin.     I spent 60 minutes in the professional and overall care of this patient.    Armen Castaneda MD  Assistant Professor of Surgery  Division of Surgical Oncology  [phone]  [email or web address]      History Of Present Illness  Referring provider: Shira Haynes  Diagnosis: melanoma  Location: right upper arm  Thickness: 2.2 mm  Margins: clear  Subtype: ss  High-risk features: 1 mitosis    All other systems have been reviewed and are negative except as noted in the HPI.    I personally reviewed all necessary laboratory results, pathology reports, and radiologic images for this patient.    Past Medical History  She has no past medical history on file.    Surgical History  She has a past surgical history that includes Other surgical history (03/10/2022); Other surgical history (03/10/2022); and Other surgical history (06/13/2022).     Social History  She reports that she quit smoking about 16 years ago. Her smoking use included cigarettes. She has been exposed to tobacco smoke. She has never used smokeless tobacco. She reports that she does not currently use alcohol. She reports that she does not use drugs.    Family History  Family History   Problem Relation Name Age of Onset    Diabetes Mother      Heart failure Father      Diabetes Father      Skin cancer Father          Allergies  Penicillins and Sulfamethoxazole-trimethoprim     Last Recorded Vitals  There were no vitals taken for this visit.    Physical Exam  General: no acute distress, well-nourished  Eyes: intact EOM, no scleral  icterus  ENT: hearing intact, no drainage  Respiratory: symmetric chest rise, no cough  Cardiovascular: intact distal pulses, no pitting edema  Abdominal: Soft, nontender  Musculoskeletal: no deformities, intact strength  Integumentary: Healing biopsy site, no lymphadenopathy  Neuro: no focal deficits, sensation intact  Psych: normal mood and affect     Relevant Results  FINAL DIAGNOSIS   SKIN, RIGHT UPPER ARM - POSTERIOR, SHAVE BIOPSY:  INVASIVE MALIGNANT MELANOMA, SUPERFICIAL SPREADING SUBTYPE, EXTENDING TO A DEPTH OF 2.2 MM.                                                         ** Electronically signed out by SHAYLEE VYAS MD **      Electronically signed by Shaylee Vyas MD on 3/31/2025 at 1228        By the signature on this report, the individual or group listed as making the Final Interpretation/Diagnosis certifies that they have reviewed this case.    Clinical History    Encounter Diagnosis: Neoplasm of uncertain behavior of skin         [id number]  Collection Comments: Differential Diagnosis: r/o melanoma  Check Margins Yes/No?:    Comments:    Dermpath Lab: Routine Histopathology (formalin-fixed tissue)  Finding Region: Right Upper Arm - Posterior  Specimen Objective: 2.3x2.0 cm irregular light and dark brown patch with 1 cm central scaly nodule      NO APPRECIABLE LAD IN RIGHT AXILLA     DISCUSSED LIKELY DIAGNOSIS OF MELANOMA AND NEXT STEPS   Microscopic Description    The Hematoxylin and Eosin stained sections of the shave specimen reveal enlarged atypical melanocytes with abundant cytoplasm poorly nested along the dermal-epidermal junction with pagetoid scatter and confluence. There are similar melanocytes nested and sheet-like throughout the dermis with scattered melanin pigment.    Gross Description    A:  Received in formalin is a 25 x 16 x 1 mm piece of skin.  It is tan and brown in color.  It is shave in shape.  It was embedded in toto in three blocks.  The specimen was inked.  The tips are  embedded in block A1.     The specimen was grossed by Sally Helm.     Case Summary Report   MELANOMA OF THE SKIN: Biopsy   8th Edition - Protocol posted: 3/23/2022MELANOMA OF THE SKIN: BIOPSY - A  SPECIMEN   Procedure  Biopsy, shave   Specimen Laterality  Right   TUMOR   Tumor Site  Skin of upper limb and shoulder: Right upper arm-posterior          Histologic Type  Superficial spreading melanoma (low-cumulative sun damage (CSD) melanoma)   Maximum Tumor (Breslow) Thickness (Millimeters)  2.2 mm   Ulceration  Not identified   Anatomic (Jeremie) Level  IV (melanoma invades reticular dermis)   Mitotic Rate  1 mitoses per mm2   Microsatellite(s)  Cannot be determined: Shave specimen     Lymphovascular Invasion  Not identified   Neurotropism  Not identified   Tumor-Infiltrating Lymphocytes  Present, nonbrisk   Tumor Regression  Not identified   MARGINS     Margin Status for Invasive Melanoma  All margins negative for invasive melanoma   Margin Status for Melanoma in situ  Melanoma in situ present at margin   Margin(s) Involved by Melanoma in Situ  Peripheral: Extends to the peripheral margins.     PATHOLOGIC STAGE CLASSIFICATION (pTNM, AJCC 8th Edition)     pT Category  pT3a   Comment(s)  Tumor block is blocks A1-A3.

## 2025-04-07 NOTE — PROGRESS NOTES
Assessment and Plan:  Elana Cox is a 59 y.o. female referred by Shira Haynes with a newly diagnosed melanoma of the right upper arm that is 2.2 mm thick, cT3a. After discussing the risks and benefits of wide local excision with sentinel lymph node biopsy the patient understands and would like to proceed. We will look for OR time at [facility] in the coming weeks. I believe this will close primarily with a 2 cm margin.     I spent 60 minutes in the professional and overall care of this patient.    Armen Castaneda MD  Assistant Professor of Surgery  Division of Surgical Oncology  [phone]  [email or web address]      History Of Present Illness  Referring provider: Shira Haynes  Diagnosis: melanoma  Location: right upper arm  Thickness: 2.2 mm  Margins: clear  Subtype: ss  High-risk features: 1 mitosis    All other systems have been reviewed and are negative except as noted in the HPI.    I personally reviewed all necessary laboratory results, pathology reports, and radiologic images for this patient.    Past Medical History  She has no past medical history on file.    Surgical History  She has a past surgical history that includes Other surgical history (03/10/2022); Other surgical history (03/10/2022); and Other surgical history (06/13/2022).     Social History  She reports that she quit smoking about 16 years ago. Her smoking use included cigarettes. She has been exposed to tobacco smoke. She has never used smokeless tobacco. She reports that she does not currently use alcohol. She reports that she does not use drugs.    Family History  Family History   Problem Relation Name Age of Onset    Diabetes Mother      Heart failure Father      Diabetes Father      Skin cancer Father          Allergies  Penicillins and Sulfamethoxazole-trimethoprim     Last Recorded Vitals  There were no vitals taken for this visit.    Physical Exam  General: no acute distress, well-nourished  Eyes: intact EOM, no scleral  icterus  ENT: hearing intact, no drainage  Respiratory: symmetric chest rise, no cough  Cardiovascular: intact distal pulses, no pitting edema  Abdominal: Soft, nontender  Musculoskeletal: no deformities, intact strength  Integumentary: Healing biopsy site, no lymphadenopathy  Neuro: no focal deficits, sensation intact  Psych: normal mood and affect     Relevant Results  FINAL DIAGNOSIS   SKIN, RIGHT UPPER ARM - POSTERIOR, SHAVE BIOPSY:  INVASIVE MALIGNANT MELANOMA, SUPERFICIAL SPREADING SUBTYPE, EXTENDING TO A DEPTH OF 2.2 MM.                                                         ** Electronically signed out by SHAYLEE YVAS MD **      Electronically signed by Shaylee Vyas MD on 3/31/2025 at 1228        By the signature on this report, the individual or group listed as making the Final Interpretation/Diagnosis certifies that they have reviewed this case.    Clinical History    Encounter Diagnosis: Neoplasm of uncertain behavior of skin         [id number]  Collection Comments: Differential Diagnosis: r/o melanoma  Check Margins Yes/No?:    Comments:    Dermpath Lab: Routine Histopathology (formalin-fixed tissue)  Finding Region: Right Upper Arm - Posterior  Specimen Objective: 2.3x2.0 cm irregular light and dark brown patch with 1 cm central scaly nodule      NO APPRECIABLE LAD IN RIGHT AXILLA     DISCUSSED LIKELY DIAGNOSIS OF MELANOMA AND NEXT STEPS   Microscopic Description    The Hematoxylin and Eosin stained sections of the shave specimen reveal enlarged atypical melanocytes with abundant cytoplasm poorly nested along the dermal-epidermal junction with pagetoid scatter and confluence. There are similar melanocytes nested and sheet-like throughout the dermis with scattered melanin pigment.    Gross Description    A:  Received in formalin is a 25 x 16 x 1 mm piece of skin.  It is tan and brown in color.  It is shave in shape.  It was embedded in toto in three blocks.  The specimen was inked.  The tips are  embedded in block A1.     The specimen was grossed by Sally Helm.     Case Summary Report   MELANOMA OF THE SKIN: Biopsy   8th Edition - Protocol posted: 3/23/2022MELANOMA OF THE SKIN: BIOPSY - A  SPECIMEN   Procedure  Biopsy, shave   Specimen Laterality  Right   TUMOR   Tumor Site  Skin of upper limb and shoulder: Right upper arm-posterior          Histologic Type  Superficial spreading melanoma (low-cumulative sun damage (CSD) melanoma)   Maximum Tumor (Breslow) Thickness (Millimeters)  2.2 mm   Ulceration  Not identified   Anatomic (Jeremie) Level  IV (melanoma invades reticular dermis)   Mitotic Rate  1 mitoses per mm2   Microsatellite(s)  Cannot be determined: Shave specimen     Lymphovascular Invasion  Not identified   Neurotropism  Not identified   Tumor-Infiltrating Lymphocytes  Present, nonbrisk   Tumor Regression  Not identified   MARGINS     Margin Status for Invasive Melanoma  All margins negative for invasive melanoma   Margin Status for Melanoma in situ  Melanoma in situ present at margin   Margin(s) Involved by Melanoma in Situ  Peripheral: Extends to the peripheral margins.     PATHOLOGIC STAGE CLASSIFICATION (pTNM, AJCC 8th Edition)     pT Category  pT3a   Comment(s)  Tumor block is blocks A1-A3.

## 2025-04-08 ENCOUNTER — OFFICE VISIT (OUTPATIENT)
Dept: SURGICAL ONCOLOGY | Facility: CLINIC | Age: 60
End: 2025-04-08
Payer: MEDICARE

## 2025-04-08 VITALS
DIASTOLIC BLOOD PRESSURE: 84 MMHG | HEART RATE: 63 BPM | RESPIRATION RATE: 18 BRPM | TEMPERATURE: 98.1 F | WEIGHT: 215.8 LBS | OXYGEN SATURATION: 96 % | BODY MASS INDEX: 35.91 KG/M2 | SYSTOLIC BLOOD PRESSURE: 149 MMHG

## 2025-04-08 DIAGNOSIS — C43.61 MELANOMA OF RIGHT UPPER ARM (MULTI): Primary | ICD-10-CM

## 2025-04-08 PROCEDURE — 1036F TOBACCO NON-USER: CPT | Performed by: STUDENT IN AN ORGANIZED HEALTH CARE EDUCATION/TRAINING PROGRAM

## 2025-04-08 PROCEDURE — 99204 OFFICE O/P NEW MOD 45 MIN: CPT | Performed by: STUDENT IN AN ORGANIZED HEALTH CARE EDUCATION/TRAINING PROGRAM

## 2025-04-08 PROCEDURE — 99214 OFFICE O/P EST MOD 30 MIN: CPT | Mod: 57 | Performed by: STUDENT IN AN ORGANIZED HEALTH CARE EDUCATION/TRAINING PROGRAM

## 2025-04-08 ASSESSMENT — PAIN SCALES - GENERAL: PAINLEVEL_OUTOF10: 6

## 2025-04-08 ASSESSMENT — COLUMBIA-SUICIDE SEVERITY RATING SCALE - C-SSRS
6. HAVE YOU EVER DONE ANYTHING, STARTED TO DO ANYTHING, OR PREPARED TO DO ANYTHING TO END YOUR LIFE?: NO
1. IN THE PAST MONTH, HAVE YOU WISHED YOU WERE DEAD OR WISHED YOU COULD GO TO SLEEP AND NOT WAKE UP?: NO
2. HAVE YOU ACTUALLY HAD ANY THOUGHTS OF KILLING YOURSELF?: NO

## 2025-04-08 NOTE — LETTER
April 8, 2025     Shira Haynes MD  [address]  Department Of Dermatology  [facility] [address]    Patient: Elana Cox   YOB: 1965   Date of Visit: 4/8/2025       Dear Dr. Shira Haynes MD:    Thank you for referring Elana Cox to me for evaluation. Below are my notes for this consultation.  If you have questions, please do not hesitate to call me. I look forward to following your patient along with you.       Sincerely,     Armen Castaneda MD      CC: No Recipients  ______________________________________________________________________________________    Assessment and Plan:  Elana Cox is a 59 y.o. female referred by Shira Haynes with a newly diagnosed melanoma of the right upper arm that is 2.2 mm thick, cT3a. After discussing the risks and benefits of wide local excision with sentinel lymph node biopsy the patient understands and would like to proceed. We will look for OR time at [place] in the coming weeks. I believe this will close primarily with a 2 cm margin.     I spent 60 minutes in the professional and overall care of this patient.    Armen Castaneda MD  Assistant Professor of Surgery  Division of Surgical Oncology  [phone]  [email or web address]      History Of Present Illness  Referring provider: Shira Haynes  Diagnosis: melanoma  Location: right upper arm  Thickness: 2.2 mm  Margins: clear  Subtype: ss  High-risk features: 1 mitosis    All other systems have been reviewed and are negative except as noted in the HPI.    I personally reviewed all necessary laboratory results, pathology reports, and radiologic images for this patient.    Past Medical History  She has no past medical history on file.    Surgical History  She has a past surgical history that includes Other surgical history (03/10/2022); Other surgical history (03/10/2022); and Other surgical history (06/13/2022).     Social History  She reports that she quit smoking about 16 years  ago. Her smoking use included cigarettes. She has been exposed to tobacco smoke. She has never used smokeless tobacco. She reports that she does not currently use alcohol. She reports that she does not use drugs.    Family History  Family History   Problem Relation Name Age of Onset   • Diabetes Mother     • Heart failure Father     • Diabetes Father     • Skin cancer Father          Allergies  Penicillins and Sulfamethoxazole-trimethoprim     Last Recorded Vitals  There were no vitals taken for this visit.    Physical Exam  General: no acute distress, well-nourished  Eyes: intact EOM, no scleral icterus  ENT: hearing intact, no drainage  Respiratory: symmetric chest rise, no cough  Cardiovascular: intact distal pulses, no pitting edema  Abdominal: Soft, nontender  Musculoskeletal: no deformities, intact strength  Integumentary: Healing biopsy site, no lymphadenopathy  Neuro: no focal deficits, sensation intact  Psych: normal mood and affect     Relevant Results  FINAL DIAGNOSIS   SKIN, RIGHT UPPER ARM - POSTERIOR, SHAVE BIOPSY:  INVASIVE MALIGNANT MELANOMA, SUPERFICIAL SPREADING SUBTYPE, EXTENDING TO A DEPTH OF 2.2 MM.                                                         ** Electronically signed out by SHAYLEE VYAS MD **      Electronically signed by Shaylee Vyas MD on 3/31/2025 at 1228        By the signature on this report, the individual or group listed as making the Final Interpretation/Diagnosis certifies that they have reviewed this case.    Clinical History    Encounter Diagnosis: Neoplasm of uncertain behavior of skin         [id number]  Collection Comments: Differential Diagnosis: r/o melanoma  Check Margins Yes/No?:    Comments:    Dermpath Lab: Routine Histopathology (formalin-fixed tissue)  Finding Region: Right Upper Arm - Posterior  Specimen Objective: 2.3x2.0 cm irregular light and dark brown patch with 1 cm central scaly nodule      NO APPRECIABLE LAD IN RIGHT AXILLA     DISCUSSED LIKELY  DIAGNOSIS OF MELANOMA AND NEXT STEPS   Microscopic Description    The Hematoxylin and Eosin stained sections of the shave specimen reveal enlarged atypical melanocytes with abundant cytoplasm poorly nested along the dermal-epidermal junction with pagetoid scatter and confluence. There are similar melanocytes nested and sheet-like throughout the dermis with scattered melanin pigment.    Gross Description    A:  Received in formalin is a 25 x 16 x 1 mm piece of skin.  It is tan and brown in color.  It is shave in shape.  It was embedded in toto in three blocks.  The specimen was inked.  The tips are embedded in block A1.     The specimen was grossed by Sally Helm.     Case Summary Report   MELANOMA OF THE SKIN: Biopsy   8th Edition - Protocol posted: 3/23/2022MELANOMA OF THE SKIN: BIOPSY - A  SPECIMEN   Procedure  Biopsy, shave   Specimen Laterality  Right   TUMOR   Tumor Site  Skin of upper limb and shoulder: Right upper arm-posterior          Histologic Type  Superficial spreading melanoma (low-cumulative sun damage (CSD) melanoma)   Maximum Tumor (Breslow) Thickness (Millimeters)  2.2 mm   Ulceration  Not identified   Anatomic (Jeremie) Level  IV (melanoma invades reticular dermis)   Mitotic Rate  1 mitoses per mm2   Microsatellite(s)  Cannot be determined: Shave specimen     Lymphovascular Invasion  Not identified   Neurotropism  Not identified   Tumor-Infiltrating Lymphocytes  Present, nonbrisk   Tumor Regression  Not identified   MARGINS     Margin Status for Invasive Melanoma  All margins negative for invasive melanoma   Margin Status for Melanoma in situ  Melanoma in situ present at margin   Margin(s) Involved by Melanoma in Situ  Peripheral: Extends to the peripheral margins.     PATHOLOGIC STAGE CLASSIFICATION (pTNM, AJCC 8th Edition)     pT Category  pT3a   Comment(s)  Tumor block is blocks A1-A3.

## 2025-04-10 NOTE — PREPROCEDURE INSTRUCTIONS

## 2025-04-14 ENCOUNTER — HOSPITAL ENCOUNTER (OUTPATIENT)
Facility: HOSPITAL | Age: 60
Setting detail: OUTPATIENT SURGERY
Discharge: HOME | End: 2025-04-14
Attending: STUDENT IN AN ORGANIZED HEALTH CARE EDUCATION/TRAINING PROGRAM | Admitting: STUDENT IN AN ORGANIZED HEALTH CARE EDUCATION/TRAINING PROGRAM
Payer: MEDICARE

## 2025-04-14 ENCOUNTER — APPOINTMENT (OUTPATIENT)
Dept: CARDIOLOGY | Facility: HOSPITAL | Age: 60
End: 2025-04-14
Payer: MEDICARE

## 2025-04-14 ENCOUNTER — HOSPITAL ENCOUNTER (OUTPATIENT)
Dept: RADIOLOGY | Facility: HOSPITAL | Age: 60
Setting detail: OUTPATIENT SURGERY
Discharge: HOME | End: 2025-04-14
Payer: MEDICARE

## 2025-04-14 ENCOUNTER — ANESTHESIA EVENT (OUTPATIENT)
Dept: OPERATING ROOM | Facility: HOSPITAL | Age: 60
End: 2025-04-14
Payer: MEDICARE

## 2025-04-14 ENCOUNTER — ANESTHESIA (OUTPATIENT)
Dept: OPERATING ROOM | Facility: HOSPITAL | Age: 60
End: 2025-04-14
Payer: MEDICARE

## 2025-04-14 VITALS
WEIGHT: 212.08 LBS | DIASTOLIC BLOOD PRESSURE: 68 MMHG | BODY MASS INDEX: 35.34 KG/M2 | TEMPERATURE: 98.1 F | SYSTOLIC BLOOD PRESSURE: 158 MMHG | HEIGHT: 65 IN | OXYGEN SATURATION: 94 % | RESPIRATION RATE: 16 BRPM | HEART RATE: 63 BPM

## 2025-04-14 DIAGNOSIS — C43.61 MELANOMA OF RIGHT UPPER ARM (MULTI): Primary | ICD-10-CM

## 2025-04-14 DIAGNOSIS — C43.61 MELANOMA OF RIGHT UPPER ARM (MULTI): ICD-10-CM

## 2025-04-14 PROCEDURE — 13121 CMPLX RPR S/A/L 2.6-7.5 CM: CPT | Performed by: STUDENT IN AN ORGANIZED HEALTH CARE EDUCATION/TRAINING PROGRAM

## 2025-04-14 PROCEDURE — 3600000003 HC OR TIME - INITIAL BASE CHARGE - PROCEDURE LEVEL THREE: Performed by: STUDENT IN AN ORGANIZED HEALTH CARE EDUCATION/TRAINING PROGRAM

## 2025-04-14 PROCEDURE — 2500000004 HC RX 250 GENERAL PHARMACY W/ HCPCS (ALT 636 FOR OP/ED): Performed by: STUDENT IN AN ORGANIZED HEALTH CARE EDUCATION/TRAINING PROGRAM

## 2025-04-14 PROCEDURE — 13122 CMPLX RPR S/A/L ADDL 5 CM/>: CPT | Performed by: STUDENT IN AN ORGANIZED HEALTH CARE EDUCATION/TRAINING PROGRAM

## 2025-04-14 PROCEDURE — 3600000008 HC OR TIME - EACH INCREMENTAL 1 MINUTE - PROCEDURE LEVEL THREE: Performed by: STUDENT IN AN ORGANIZED HEALTH CARE EDUCATION/TRAINING PROGRAM

## 2025-04-14 PROCEDURE — 7100000010 HC PHASE TWO TIME - EACH INCREMENTAL 1 MINUTE: Performed by: STUDENT IN AN ORGANIZED HEALTH CARE EDUCATION/TRAINING PROGRAM

## 2025-04-14 PROCEDURE — 2500000004 HC RX 250 GENERAL PHARMACY W/ HCPCS (ALT 636 FOR OP/ED): Performed by: ANESTHESIOLOGY

## 2025-04-14 PROCEDURE — 78830 RP LOCLZJ TUM SPECT W/CT 1: CPT

## 2025-04-14 PROCEDURE — 38525 BIOPSY/REMOVAL LYMPH NODES: CPT | Performed by: STUDENT IN AN ORGANIZED HEALTH CARE EDUCATION/TRAINING PROGRAM

## 2025-04-14 PROCEDURE — 7100000001 HC RECOVERY ROOM TIME - INITIAL BASE CHARGE: Performed by: STUDENT IN AN ORGANIZED HEALTH CARE EDUCATION/TRAINING PROGRAM

## 2025-04-14 PROCEDURE — 93010 ELECTROCARDIOGRAM REPORT: CPT | Performed by: INTERNAL MEDICINE

## 2025-04-14 PROCEDURE — 7100000002 HC RECOVERY ROOM TIME - EACH INCREMENTAL 1 MINUTE: Performed by: STUDENT IN AN ORGANIZED HEALTH CARE EDUCATION/TRAINING PROGRAM

## 2025-04-14 PROCEDURE — 78830 RP LOCLZJ TUM SPECT W/CT 1: CPT | Performed by: RADIOLOGY

## 2025-04-14 PROCEDURE — 3430000001 HC RX 343 DIAGNOSTIC RADIOPHARMACEUTICALS: Performed by: STUDENT IN AN ORGANIZED HEALTH CARE EDUCATION/TRAINING PROGRAM

## 2025-04-14 PROCEDURE — 2720000007 HC OR 272 NO HCPCS: Performed by: STUDENT IN AN ORGANIZED HEALTH CARE EDUCATION/TRAINING PROGRAM

## 2025-04-14 PROCEDURE — 78195 LYMPH SYSTEM IMAGING: CPT | Performed by: RADIOLOGY

## 2025-04-14 PROCEDURE — 3700000001 HC GENERAL ANESTHESIA TIME - INITIAL BASE CHARGE: Performed by: STUDENT IN AN ORGANIZED HEALTH CARE EDUCATION/TRAINING PROGRAM

## 2025-04-14 PROCEDURE — 93005 ELECTROCARDIOGRAM TRACING: CPT

## 2025-04-14 PROCEDURE — 7100000009 HC PHASE TWO TIME - INITIAL BASE CHARGE: Performed by: STUDENT IN AN ORGANIZED HEALTH CARE EDUCATION/TRAINING PROGRAM

## 2025-04-14 PROCEDURE — 2500000001 HC RX 250 WO HCPCS SELF ADMINISTERED DRUGS (ALT 637 FOR MEDICARE OP): Performed by: ANESTHESIOLOGY

## 2025-04-14 PROCEDURE — 3700000002 HC GENERAL ANESTHESIA TIME - EACH INCREMENTAL 1 MINUTE: Performed by: STUDENT IN AN ORGANIZED HEALTH CARE EDUCATION/TRAINING PROGRAM

## 2025-04-14 PROCEDURE — 11606 EXC TR-EXT MAL+MARG >4 CM: CPT | Performed by: STUDENT IN AN ORGANIZED HEALTH CARE EDUCATION/TRAINING PROGRAM

## 2025-04-14 PROCEDURE — 2500000005 HC RX 250 GENERAL PHARMACY W/O HCPCS: Performed by: STUDENT IN AN ORGANIZED HEALTH CARE EDUCATION/TRAINING PROGRAM

## 2025-04-14 PROCEDURE — A9520 TC99 TILMANOCEPT DIAG 0.5MCI: HCPCS | Performed by: STUDENT IN AN ORGANIZED HEALTH CARE EDUCATION/TRAINING PROGRAM

## 2025-04-14 PROCEDURE — 38900 IO MAP OF SENT LYMPH NODE: CPT | Performed by: STUDENT IN AN ORGANIZED HEALTH CARE EDUCATION/TRAINING PROGRAM

## 2025-04-14 RX ORDER — SODIUM CHLORIDE, SODIUM LACTATE, POTASSIUM CHLORIDE, CALCIUM CHLORIDE 600; 310; 30; 20 MG/100ML; MG/100ML; MG/100ML; MG/100ML
50 INJECTION, SOLUTION INTRAVENOUS CONTINUOUS
Status: DISCONTINUED | OUTPATIENT
Start: 2025-04-14 | End: 2025-04-14 | Stop reason: HOSPADM

## 2025-04-14 RX ORDER — FENTANYL CITRATE 50 UG/ML
50 INJECTION, SOLUTION INTRAMUSCULAR; INTRAVENOUS EVERY 5 MIN PRN
Status: DISCONTINUED | OUTPATIENT
Start: 2025-04-14 | End: 2025-04-14 | Stop reason: HOSPADM

## 2025-04-14 RX ORDER — MIDAZOLAM HYDROCHLORIDE 1 MG/ML
INJECTION, SOLUTION INTRAMUSCULAR; INTRAVENOUS AS NEEDED
Status: DISCONTINUED | OUTPATIENT
Start: 2025-04-14 | End: 2025-04-14

## 2025-04-14 RX ORDER — ACETAMINOPHEN 325 MG/1
650 TABLET ORAL EVERY 4 HOURS PRN
Status: CANCELLED | OUTPATIENT
Start: 2025-04-14

## 2025-04-14 RX ORDER — PHENYLEPHRINE HCL IN 0.9% NACL 1 MG/10 ML
SYRINGE (ML) INTRAVENOUS AS NEEDED
Status: DISCONTINUED | OUTPATIENT
Start: 2025-04-14 | End: 2025-04-14

## 2025-04-14 RX ORDER — OXYCODONE HYDROCHLORIDE 5 MG/1
10 TABLET ORAL EVERY 4 HOURS PRN
Status: CANCELLED | OUTPATIENT
Start: 2025-04-14

## 2025-04-14 RX ORDER — FENTANYL CITRATE 50 UG/ML
INJECTION, SOLUTION INTRAMUSCULAR; INTRAVENOUS AS NEEDED
Status: DISCONTINUED | OUTPATIENT
Start: 2025-04-14 | End: 2025-04-14

## 2025-04-14 RX ORDER — PROCHLORPERAZINE EDISYLATE 5 MG/ML
5 INJECTION INTRAMUSCULAR; INTRAVENOUS ONCE AS NEEDED
Status: DISCONTINUED | OUTPATIENT
Start: 2025-04-14 | End: 2025-04-14 | Stop reason: HOSPADM

## 2025-04-14 RX ORDER — GLYCOPYRROLATE 0.2 MG/ML
INJECTION INTRAMUSCULAR; INTRAVENOUS AS NEEDED
Status: DISCONTINUED | OUTPATIENT
Start: 2025-04-14 | End: 2025-04-14

## 2025-04-14 RX ORDER — LAMOTRIGINE 100 MG/1
200 TABLET ORAL ONCE
Status: COMPLETED | OUTPATIENT
Start: 2025-04-14 | End: 2025-04-14

## 2025-04-14 RX ORDER — SODIUM CHLORIDE 0.9 G/100ML
INJECTION, SOLUTION IRRIGATION AS NEEDED
Status: DISCONTINUED | OUTPATIENT
Start: 2025-04-14 | End: 2025-04-14 | Stop reason: HOSPADM

## 2025-04-14 RX ORDER — MEPERIDINE HYDROCHLORIDE 25 MG/ML
12.5 INJECTION INTRAMUSCULAR; INTRAVENOUS; SUBCUTANEOUS EVERY 10 MIN PRN
Status: DISCONTINUED | OUTPATIENT
Start: 2025-04-14 | End: 2025-04-14 | Stop reason: HOSPADM

## 2025-04-14 RX ORDER — FENTANYL CITRATE 50 UG/ML
25 INJECTION, SOLUTION INTRAMUSCULAR; INTRAVENOUS EVERY 5 MIN PRN
Status: DISCONTINUED | OUTPATIENT
Start: 2025-04-14 | End: 2025-04-14 | Stop reason: HOSPADM

## 2025-04-14 RX ORDER — ONDANSETRON HYDROCHLORIDE 2 MG/ML
INJECTION, SOLUTION INTRAVENOUS AS NEEDED
Status: DISCONTINUED | OUTPATIENT
Start: 2025-04-14 | End: 2025-04-14

## 2025-04-14 RX ORDER — LIDOCAINE HYDROCHLORIDE 20 MG/ML
INJECTION, SOLUTION INFILTRATION; PERINEURAL AS NEEDED
Status: DISCONTINUED | OUTPATIENT
Start: 2025-04-14 | End: 2025-04-14

## 2025-04-14 RX ORDER — SODIUM CHLORIDE, SODIUM LACTATE, POTASSIUM CHLORIDE, CALCIUM CHLORIDE 600; 310; 30; 20 MG/100ML; MG/100ML; MG/100ML; MG/100ML
100 INJECTION, SOLUTION INTRAVENOUS CONTINUOUS
Status: DISCONTINUED | OUTPATIENT
Start: 2025-04-14 | End: 2025-04-14 | Stop reason: HOSPADM

## 2025-04-14 RX ORDER — DIPHENHYDRAMINE HYDROCHLORIDE 50 MG/ML
INJECTION, SOLUTION INTRAMUSCULAR; INTRAVENOUS AS NEEDED
Status: DISCONTINUED | OUTPATIENT
Start: 2025-04-14 | End: 2025-04-14

## 2025-04-14 RX ORDER — PROPOFOL 10 MG/ML
INJECTION, EMULSION INTRAVENOUS AS NEEDED
Status: DISCONTINUED | OUTPATIENT
Start: 2025-04-14 | End: 2025-04-14

## 2025-04-14 RX ORDER — OXYCODONE HYDROCHLORIDE 5 MG/1
5 TABLET ORAL EVERY 4 HOURS PRN
Status: CANCELLED | OUTPATIENT
Start: 2025-04-14

## 2025-04-14 RX ORDER — TRAMADOL HYDROCHLORIDE 50 MG/1
50 TABLET ORAL 2 TIMES DAILY PRN
Qty: 5 TABLET | Refills: 0 | Status: SHIPPED | OUTPATIENT
Start: 2025-04-14 | End: 2025-04-19

## 2025-04-14 RX ADMIN — GLYCOPYRROLATE 0.2 MG: 0.2 INJECTION, SOLUTION INTRAMUSCULAR; INTRAVENOUS at 10:53

## 2025-04-14 RX ADMIN — DEXAMETHASONE SODIUM PHOSPHATE 4 MG: 4 INJECTION, SOLUTION INTRAMUSCULAR; INTRAVENOUS at 10:37

## 2025-04-14 RX ADMIN — DIPHENHYDRAMINE HYDROCHLORIDE 12.5 MG: 50 INJECTION INTRAMUSCULAR; INTRAVENOUS at 10:37

## 2025-04-14 RX ADMIN — LIDOCAINE HYDROCHLORIDE 80 MG: 20 INJECTION, SOLUTION INFILTRATION; PERINEURAL at 10:32

## 2025-04-14 RX ADMIN — Medication 100 MCG: at 11:46

## 2025-04-14 RX ADMIN — SODIUM CHLORIDE, POTASSIUM CHLORIDE, SODIUM LACTATE AND CALCIUM CHLORIDE 50 ML/HR: 600; 310; 30; 20 INJECTION, SOLUTION INTRAVENOUS at 06:29

## 2025-04-14 RX ADMIN — LAMOTRIGINE 200 MG: 100 TABLET ORAL at 08:43

## 2025-04-14 RX ADMIN — FENTANYL CITRATE 50 MCG: 50 INJECTION INTRAMUSCULAR; INTRAVENOUS at 10:55

## 2025-04-14 RX ADMIN — MIDAZOLAM 2 MG: 1 INJECTION INTRAMUSCULAR; INTRAVENOUS at 10:32

## 2025-04-14 RX ADMIN — Medication 0.62 MILLICURIE: at 07:33

## 2025-04-14 RX ADMIN — ONDANSETRON 4 MG: 2 INJECTION, SOLUTION INTRAMUSCULAR; INTRAVENOUS at 11:55

## 2025-04-14 RX ADMIN — VANCOMYCIN HYDROCHLORIDE 1500 MG: 1.5 INJECTION, POWDER, LYOPHILIZED, FOR SOLUTION INTRAVENOUS at 10:14

## 2025-04-14 RX ADMIN — SODIUM CHLORIDE, POTASSIUM CHLORIDE, SODIUM LACTATE AND CALCIUM CHLORIDE: 600; 310; 30; 20 INJECTION, SOLUTION INTRAVENOUS at 11:48

## 2025-04-14 RX ADMIN — Medication 100 MCG: at 11:12

## 2025-04-14 RX ADMIN — GLYCOPYRROLATE 0.2 MG: 0.2 INJECTION, SOLUTION INTRAMUSCULAR; INTRAVENOUS at 11:01

## 2025-04-14 RX ADMIN — PROPOFOL 200 MG: 10 INJECTION, EMULSION INTRAVENOUS at 10:32

## 2025-04-14 ASSESSMENT — PAIN - FUNCTIONAL ASSESSMENT
PAIN_FUNCTIONAL_ASSESSMENT: 0-10

## 2025-04-14 ASSESSMENT — COLUMBIA-SUICIDE SEVERITY RATING SCALE - C-SSRS
2. HAVE YOU ACTUALLY HAD ANY THOUGHTS OF KILLING YOURSELF?: NO
6. HAVE YOU EVER DONE ANYTHING, STARTED TO DO ANYTHING, OR PREPARED TO DO ANYTHING TO END YOUR LIFE?: NO
1. IN THE PAST MONTH, HAVE YOU WISHED YOU WERE DEAD OR WISHED YOU COULD GO TO SLEEP AND NOT WAKE UP?: NO

## 2025-04-14 ASSESSMENT — PAIN SCALES - GENERAL
PAINLEVEL_OUTOF10: 0 - NO PAIN

## 2025-04-14 NOTE — ANESTHESIA PROCEDURE NOTES
Airway  Date/Time: 4/14/2025 10:33 AM  Urgency: elective    Airway not difficult    Staffing  Performed: attending   Authorized by: Keshav Hardy MD    Performed by: Keshav Hardy MD  Patient location during procedure: OR    Indications and Patient Condition  Indications for airway management: anesthesia  Spontaneous ventilation: present  Sedation level: no sedation  Preoxygenated: yes  Patient position: sniffing  MILS not maintained throughout  Mask difficulty assessment: 0 - not attempted  Planned trial extubation    Final Airway Details  Final airway type: supraglottic airway      Successful airway: Supraglottic airway: ambu.  Size 4     Number of attempts at approach: 1  Ventilation between attempts: none  Number of other approaches attempted: 0

## 2025-04-14 NOTE — PERIOPERATIVE NURSING NOTE
Patient tolerating fluids and food without nausea or vomiting, no complaints of pain, return to baseline cognitive level, follows commands and is cooperative.    Discharge instructions and follow care given to caregiver/ Tod. Voiced understanding.

## 2025-04-14 NOTE — PERIOPERATIVE NURSING NOTE
Patient has history of brain surgery. Aphasia per chart. She nods appropriately,follows commands and answers questions with one word answers.

## 2025-04-14 NOTE — OP NOTE
Excision Lesion Skin Upper Extremity (R), [place] Lymph Node Biopsy (R) Operative Note     Date: 2025  OR Location: [facility]    Name: Elana Cox, : 1965, Age: 59 y.o., MRN: [id number], Sex: female    Diagnosis  Pre-op Diagnosis      * Melanoma of right upper arm (Multi) [C43.61] Post-op Diagnosis     * Melanoma of right upper arm (Multi) [C43.61]     Procedures  Excision Lesion Skin Upper Extremity  32407 - [place] EXCISION MALIGNANT LESION TRUNK/ARM/LEG > 4.0 CM    [place] Lymph Node Biopsy  69340 - [place] BX/EXC LYMPH NODE OPEN DEEP AXILLARY NODE      Surgeons      * Armen Castaneda - Primary    Resident/Fellow/Other Assistant:  Surgeons and Role:  * No surgeons found with a matching role *    Staff:   Circulator: Adela Floyd Person: Thelma Floyd Person: Jaycee  Surgical Assistant: Josh Fraser Circulator: Sadie    Anesthesia Staff: Anesthesiologist: Keshav Hardy MD    Procedure Summary  Anesthesia: General  ASA: III  Estimated Blood Loss: 10mL  Intra-op Medications:   Administrations occurring from 0930 to 1125 on 25:   Medication Name Total Dose   sodium chloride 0.9 % irrigation solution 1,000 mL   BUPivacaine HCl (Marcaine) 0.5 % (5 mg/mL) 17 mL, lidocaine (Xylocaine) 17 mL syringe 25 mL   dexAMETHasone 4 mg/mL 4 mg   diphenhydrAMINE 50 mg/mL 12.5 mg   fentaNYL PF 0.05 mg/mL 50 mcg   glycopyrrolate (Robinul) injection 0.4 mg   lidocaine (Xylocaine) injection 2 % 80 mg   midazolam (Versed) 1 mg/1 mL 2 mg   phenylephrine 100 mcg/mL syringe 10 mL (prefilled) 100 mcg   propofol (Diprivan) injection 10 mg/mL 200 mg   vancomycin (Vancocin) 1,500 mg in dextrose 5%  mL 394.41 mL              Anesthesia Record               Intraprocedure I/O Totals       None           Specimen:   ID Type Source Tests Collected by Time   1 : RIGHT AXILLARY SENTINEL LYMPH NODE #1 ([id number]) Tissue SENTINEL LYMPH NODE MELANOMA DERMPATH LAB- DERMATOPATHOLOGY Armen Castaneda MD 2025 1110   2 : WIDE LOCAL  EXCISION RIGHT UPPER ARM Tissue SKIN WIDE EXCISION DERMPATH LAB- DERMATOPATHOLOGY Armen Castaneda MD 4/14/2025 1123                 INDICATIONS FOR SURGERY  Referring provider: Shira Haynes  Diagnosis: melanoma  Location: right upper arm  Thickness: 2.2 mm      DETAILS OF PROCEDURE    Wide Local Excision for Primary Cutaneous Melanoma  Operation performed with curative intent Yes   Original Breslow thickness of the lesion 2.2 mm (to the tenth of a millimeter)   Clinical margin width  2 cm   Depth of excision Full-thickness skin/subcutaneous tissue down to fascia (melanoma)     Final dimensions of excision: 6 cm x 18 cm  Specimen marking stitches: short superior, long posterior  Excision closure: 4-0 monocryl and Dermabond  Location of sentinel nodes: right axilla  Number of sentinel lymph nodes: 1    The patient arrived at [facility] for the aforementioned procedure. Consent was obtained, history and physical performed, and questions were answered. The patient was moved into the operating room and induced under anesthesia. A timeout was performed prior to surgery.       For the wide local excision, the surgical site was prepped and draped in the usual sterile fashion. A margin was drawn about the lesion and this was extended into a 3:1 elliptical incision along natural body lines to facilitate a tension-free closure. Local anesthetic was injected and an incision was made along this ellipse. Electrocautery was used to dissected down to the muscular fascia and the specimen was then removed and oriented with sutures as indicated above. This was sent for permanent dermatopathology. Circumferential soft tissue flaps were created to facilitate closure. The wound was irrigated and hemostasis was achieved. The wound was then closed in a complex multilayer fashion using deep 2-0 Vicryl [id number], interrupted 3-0 Vicryl deep dermals, and a cutaneous closure indicated above. The skin was dressed with  Dermabond.       For the sentinel lymph node biopsy, the lymph node basin was prepped and draped in the usual sterile fashion after verifying radiotracer presence in this basin with the neoprobe. A 3 cm incision was made over the radioactivity and dissection was carried out with electrocautery to identify the sentinel node. The node/nodes was/were removed using clips and suture as needed to control blood vessels and lymphatics and sent for permanent pathology. The wound was then irrigated and hemostasis achieved. This was closed in a multilayer fashion using a deep 2-0 Vicryl figure-of-eight, interrupted deep 3-0 Vicryl, and a running subcuticular 4-0 Monocryl. The skin was dressed with Dermabond.       The patient was awoken and returned to the PACU in anticipation of discharge home. I was present scrubbed and directed all operative decision-making.     Please note that we will be billing for the YENNI's first assist as he/she was critical for successful completion of the case including positioning of the body, retraction, suture management,  and wound closure. There was no qualified resident available for the case.

## 2025-04-14 NOTE — DISCHARGE INSTRUCTIONS
Melanoma Surgery Discharge Instructions    Diet  No diet restrictions    Pain control  For baseline pain control: Tylenol (acetaminophen) 1,000 mg every 8 hours for one week  For mild pain: ibuprofen 600 mg every 8 hours with food as needed  For moderate to severe pain: Tramadol as prescribed    Activity  No specific lifting or activity restrictions  In general, listen to your body and do not overly stress your surgical sites    Incisions  Your incisions are covered with skin glue. This is a sterile dressing placed in the operating room. Do not pick or peel it off. This will fall off in 2-3 weeks.  No dressing changes or wound care is needed. Do not use any ointments or cleaning agents.  You have multiple layers of sutures under the skin that will dissolve.   If you have sutures through the skin they will be removed at your postoperative appointment in 3 weeks.  You may shower the day after surgery. Let soap and water wash over the incision and pat it dry.   No going underwater (bath, pool, lake, ocean, etc.) for 2 weeks.  If you notice any of the following, please call [facility] ([phone]) or Dr. Castaneda's office ([phone]).  Swelling under the incision like a golf ball or larger.  Redness of the skin that is spreading away from the incision.  Drainage from the incision.  Fevers, chills, or any other concerning symptoms.    Follow-up  Call Regla Berger at [phone] to arrange a postop visit in 2-3 weeks  If you have sutures through your skin, these should be removed at 3 weeks  Dr. Castaneda will discuss your pathology and Tumor Board recommendations at your postop visit. You may see your pathology online prior the visit. Write down any questions you have and bring them to your postop visit.    General Anesthesia Discharge Instructions    About this topic  You may need general anesthesia if you need to be asleep during a procedure. Your doctor will use drugs to block the signals that go from your nerves to  your brain. Doctors give general anesthesia during a surgery or procedure to:  Allow you to sleep  Help your body be still  Relax your muscles  Help you to relax and be pain free  Keep you from remembering the surgery  Let the doctor manage your airway, breathing, and blood flow  The doctor or nurse anesthetist gives general anesthesia by a shot into your vein. Sometimes, you may breathe in a gas through a mask placed over your face.  What care is needed at home?  Ask your doctor what you need to do when you go home. Make sure you ask questions if you do not understand what the doctor says.  Your doctor may give you drugs to prevent or treat an upset stomach from the anesthetic. Take them as ordered.  If your throat is sore, suck on ice chips or popsicles to ease throat pain.  Put 2 to 3 pillows under your head and back when you lie down to help you breathe easier.  For the first 24 to 48 hours:  Do not operate heavy or dangerous machinery.  Do not make major decisions or sign important papers. You may not be able to think clearly.  Avoid beer, wine, or mixed drinks.  You are at a higher risk of falling for at least 24 hours after general anesthesia.  Take extra care when you get up.  Do not change positions quickly.  Do not rush when you need to go to the bathroom or to answer the phone.  Ask for help if you feel unsteady when you try to walk.  Wear shoes with non-slip soles and low heels.  What follow-up care is needed?  Your doctor may ask you to come back to the office to check on your progress. Be sure to keep these visits.  If you have stitches that do not dissolve or staples, you will need to have them removed. Your doctor will want to do this in 1 to 2 weeks. If the doctor used skin glue, the glue will fall off on its own.  What drugs may be needed?  The doctor may order drugs to:  Help with pain  Treat an upset stomach or throwing up  Will physical activity be limited?  You will not be allowed to drive  right away after the procedure. Ask a family member or a friend to drive you home.  Avoid trying to get out of bed without help until you are sure of your balance.  You may have to limit your activity. Talk to your doctor about if you need to limit how much you lift or limit exercise after your procedure.  What changes to diet are needed?  Start with a light diet when you are fully awake. This includes things that are easy to swallow like soups, pudding, jello, toast, and eggs. Slowly progress to your normal diet.  What problems could happen?  Low blood pressure  Breathing problems  Upset stomach or throwing up  Dizziness  Blood clots  Infection  When do I need to call the doctor?  Trouble breathing  Upset stomach or throwing up more than 3 times in the next 2 days  Dizziness  Teach Back: Helping You Understand  The Teach Back Method helps you understand the information we are giving you. After you talk with the staff, tell them in your own words what you learned. This helps to make sure the staff has described each thing clearly. It also helps to explain things that may have been confusing. Before going home, make sure you can do these:  I can tell you about my procedure.  I can tell you if I need to follow up with my doctor.  I can tell you what is good for me to eat and drink the next day.  I can tell you what I would do if I have trouble breathing, an upset stomach, or dizziness.  Where can I learn more?  [employer] [place] [employer]  https://www.nigms.nih.gov/education/pages/factsheet_Anesthesia.aspx  NHS Choices  http://www.nhs.uk/conditions/Anaesthetic-general/Pages/Definition.aspx  Last Reviewed Date  2020-04-22  Physician phone number provided to patient upon discharge

## 2025-04-14 NOTE — ANESTHESIA PREPROCEDURE EVALUATION
Elana Cox is a 59 y.o. female here for:      Excision Lesion Skin Upper Extremity, [place] Lymph Node Biopsy  With Armen Castaneda MD  Pre-Op Diagnosis Codes:      * Melanoma of right upper arm [C43.61]    Lab Results   Component Value Date    HGB 12.4 06/06/2024    HCT 38.7 06/06/2024    WBC 6.3 06/06/2024     06/06/2024     06/06/2024    K 3.6 06/06/2024     06/06/2024    CREATININE 0.90 06/06/2024    BUN 16 06/06/2024       Social History     Substance and Sexual Activity   Drug Use Never      Tobacco Use: Medium Risk (4/14/2025)    Patient History     Smoking Tobacco Use: Former     Smokeless Tobacco Use: Never     Passive Exposure: Past      Social History     Substance and Sexual Activity   Alcohol Use Not Currently        Allergies   Allergen Reactions    Penicillins Anaphylaxis     Other reaction(s): Anaphylactic Shock    Sulfamethoxazole-Trimethoprim Unknown       Current Outpatient Medications   Medication Instructions    atorvastatin (LIPITOR) 20 mg, oral, Daily    clobetasol (Temovate) 0.05 % ointment Topical, 2 times daily    lamoTRIgine (LAMICTAL) 200 mg, oral, 2 times daily    levETIRAcetam (KEPPRA) 500 mg, oral, 2 times daily    levothyroxine (SYNTHROID, LEVOXYL) 125 mcg, Daily    pantoprazole (PROTONIX) 40 mg, oral, Daily    sertraline (ZOLOFT) 100 mg, oral, Daily    valACYclovir (VALTREX) 1,000 mg, oral, Daily       Past Medical History:   Diagnosis Date    Anxiety     Cognitive impairment     Delayed emergence from general anesthesia     Depression     GERD (gastroesophageal reflux disease)     Hyperlipidemia     Hypothyroidism     Melanoma of right upper arm (Multi)     Memory impairment     Migraines     Seizures (Multi)     EPILEPSY       Past Surgical History:   Procedure Laterality Date    BRAIN SURGERY      COLONOSCOPY      HERNIA REPAIR      HYSTERECTOMY      SKIN BIOPSY         Family History   Problem Relation Name Age of Onset    Diabetes Mother      Heart  "failure Father      Diabetes Father      Skin cancer Father         Relevant Problems   Cardiac   (+) Hyperlipidemia      Pulmonary   (+) Asthma   (+) Chronic obstructive pulmonary disease (Multi)   (+) Community acquired pneumonia      Neuro   (+) Anxiety   (+) Breakthrough seizure (Multi)   (+) Epilepsy   (+) Intractable chronic migraine without aura   (+) Intractable focal epilepsy (Multi)   (+) Partial epilepsy with impairment of consciousness   (+) Peripheral nerve disease   (+) Recurrent major depression   (+) Seizure (Multi)   (+) Seizure disorder (Multi)   (+) Temporal lobe epilepsy (Multi)      GI   (+) Gastroesophageal reflux disease      /Renal   (+) Urinary tract bacterial infections      Endocrine   (+) Hypothyroidism   (+) Obesity      ID   (+) Acute upper respiratory infection   (+) Community acquired pneumonia   (+) Herpes labialis   (+) Urinary tract bacterial infections      Skin   (+) Rash       Visit Vitals  /73   Pulse 59   Temp 36.2 °C (97.2 °F) (Temporal)   Resp 17   Ht 1.651 m (5' 5\")   Wt 96.2 kg (212 lb 1.3 oz)   SpO2 94%   BMI 35.29 kg/m²   OB Status Hysterectomy   Smoking Status Former   BSA 2.1 m²       NPO Details:  NPO/Void Status  Carbohydrate Drink Given Prior to Surgery? : N  Date of Last Liquid: 04/14/25  Time of Last Liquid: 0530 (sip of water)  Date of Last Solid: 04/13/25  Time of Last Solid: 1630  Last Intake Type: Clear fluids  Time of Last Void: 0530        Physical Exam    Airway  Mallampati: II     Cardiovascular - normal exam     Dental - normal exam     Pulmonary - normal exam     Abdominal   (+) obese  Abdomen: soft             Anesthesia Plan    History of general anesthesia?: yes  History of complications of general anesthesia?: no    ASA 3     general     intravenous induction   Anesthetic plan and risks discussed with patient.      "

## 2025-04-15 ASSESSMENT — PAIN SCALES - GENERAL: PAIN_LEVEL: 2

## 2025-04-15 NOTE — ANESTHESIA POSTPROCEDURE EVALUATION
Patient: Elana Cox    Procedure Summary       Date: 04/14/25 Room / Location: [facility] / [facility]    Anesthesia Start: 1025 Anesthesia Stop: 1215    Procedures:       Excision Lesion Skin Upper Extremity (Right)      [place] Lymph Node Biopsy (Right) Diagnosis:       Melanoma of right upper arm (Multi)      (Melanoma of right upper arm (Multi) [C43.61])    Surgeons: Armen Castaneda MD Responsible Provider: Keshav Hardy MD    Anesthesia Type: general ASA Status: 3            Anesthesia Type: general    Vitals Value Taken Time   /55 04/14/25 1303   Temp 36.3 °C (97.3 °F) 04/14/25 1253   Pulse 62 04/14/25 1305   Resp 13 04/14/25 1305   SpO2 93 % 04/14/25 1305   Vitals shown include unfiled device data.    Anesthesia Post Evaluation    Patient location during evaluation: PACU  Patient participation: complete - patient participated  Level of consciousness: awake  Pain score: 2  Pain management: adequate  Airway patency: patent  Cardiovascular status: acceptable and hemodynamically stable  Respiratory status: acceptable and nonlabored ventilation  Hydration status: acceptable  Postoperative Nausea and Vomiting: none        No notable events documented.

## 2025-04-16 LAB
ATRIAL RATE: 57 BPM
P AXIS: 43 DEGREES
P OFFSET: 164 MS
P ONSET: 108 MS
PR INTERVAL: 208 MS
Q ONSET: 212 MS
QRS COUNT: 9 BEATS
QRS DURATION: 98 MS
QT INTERVAL: 452 MS
QTC CALCULATION(BAZETT): 439 MS
QTC FREDERICIA: 444 MS
R AXIS: -23 DEGREES
T AXIS: 55 DEGREES
T OFFSET: 438 MS
VENTRICULAR RATE: 57 BPM

## 2025-04-18 LAB
LAB AP ASR DISCLAIMER: NORMAL
LABORATORY COMMENT REPORT: NORMAL
PATH REPORT.FINAL DX SPEC: NORMAL
PATH REPORT.GROSS SPEC: NORMAL
PATH REPORT.MICROSCOPIC SPEC OTHER STN: NORMAL
PATH REPORT.RELEVANT HX SPEC: NORMAL
PATH REPORT.TOTAL CANCER: NORMAL

## 2025-04-20 DIAGNOSIS — C43.9 MELANOMA OF SKIN (MULTI): ICD-10-CM

## 2025-04-21 ENCOUNTER — TUMOR BOARD CONFERENCE (OUTPATIENT)
Dept: HEMATOLOGY/ONCOLOGY | Facility: HOSPITAL | Age: 60
End: 2025-04-21
Payer: MEDICARE

## 2025-04-21 NOTE — TUMOR BOARD NOTE
General Patient Information  Name:  Elana Cox  Evaluation #:  2  Conference Date:  04/21/25  YOB: 1965  MRN:  [id number]  Program Physician(s):  Caleb Disla  Referring Physician(s):  Armen Begum      Summary   Stage:  cIIA ([id number])   Melanoma 5 year survival: 94%    Assessment:  Right upper arm with a non-ulcerated superficial spreading melanoma, Breslow depth 2.2 mm.    Now, s/p WLE with 2 cm margins and SLNB, with pathology showing no residual neoplasm, with margins clear. SLNB examined one right axillary sentinel node showing benign-appearing melanocytes staining Melan-A and SOX-10 with benign lymph node favored. Adequately surgically treated.    Recommendation:  Annual H&P.    Review Multidisciplinary Cutaneous Oncology Conference recommendation with patient.  Continue routine follow up and total body skin exams with Shira Haynes.    Follow Up:  Armen Begum      History and Physical Exam  Dermatologic History:   59 y.o. female with a biopsy of the right upper arm on 03/27/25 showing a non-ulcerated melanoma, superficial spreading type, Breslow: 2.2 mm.     Now, s/p WLE with 2 cm margins and SLNB 4/14/25, with pathology showing no residual neoplasm, with margins clear. SLNB examined one right axillary sentinel node showing benign-appearing melanocytes staining Melan-A and SOX-10 with benign lymph node favored. Adequately surgically treated.    Past Medical History:    Past Medical History:   Diagnosis Date    Anxiety     Cognitive impairment     Delayed emergence from general anesthesia     Depression     GERD (gastroesophageal reflux disease)     Hyperlipidemia     Hypothyroidism     Melanoma of right upper arm (Multi)     Memory impairment     Migraines     Seizures (Multi)     EPILEPSY       Family History of DNS/MM:  Unknown    Skin:  Right Upper Arm - Posterior  2.3x2.0 cm irregular light and dark brown patch with 1 cm central scaly nodule      Lymphatic:  NO APPRECIABLE LAD IN RIGHT AXILLA       Pathology    DERM LAB: [id number]   Collected 4/14/2025 11:10   Component    FINAL DIAGNOSIS   A: NODE, RIGHT AXILLARY SENTINEL LYMPH NODE #1 (COUNT-051), BIOPSY:  CAPSULAR NEVUS WITH FOCAL SOX-10 AND MELAN-A STAINING, SEE NOTE.     Note:  Microscopic examination reveals a lymph node.  In the capsule, there are benign-appearing melanocytes that stain with antibodies against Melan-A and SOX-10.  There is a focal area of Melan-A staining and a separate area of focal SOX-10-positive staining.  The primary melanoma in [id number] was reviewed, and a benign lymph node is favored.       B: SKIN, WIDE LOCAL EXCISION RIGHT UPPER ARM:  CHANGES CONSISTENT WITH PREVIOUS PROCEDURE, INKED MARGINS FREE IN PLANES OF SECTIONS EXAMINED, WITHOUT RESIDUAL ATYPICAL MELANOCYTIC NEOPLASM SEEN.      ** Electronically signed out by Robb Tripp MD **            Electronically signed by Robb Tripp MD on 4/18/2025 at 1619 EDT     ___________________________________________________________________________________________________    Dermatopathology- DERM LAB: [id number]   Collected 3/27/2025    Component    FINAL DIAGNOSIS   SKIN, RIGHT UPPER ARM - POSTERIOR, SHAVE BIOPSY:  INVASIVE MALIGNANT MELANOMA, SUPERFICIAL SPREADING SUBTYPE, EXTENDING TO A DEPTH OF 2.2 MM.                                                         ** Electronically signed out by SHAYLEE VYAS MD **      Electronically signed by Shaylee Vyas MD on 3/31/2025 at 1228     SPECIMEN   Procedure  Biopsy, shave   Specimen Laterality  Right   TUMOR   Tumor Site  Skin of upper limb and shoulder: Right upper arm-posterior          Histologic Type  Superficial spreading melanoma (low-cumulative sun damage (CSD) melanoma)   Maximum Tumor (Breslow) Thickness (Millimeters)  2.2 mm   Ulceration  Not identified   Anatomic (Jeremie) Level  IV (melanoma invades reticular dermis)   Mitotic Rate  1 mitoses per mm2   Microsatellite(s)   Cannot be determined: Shave specimen     Lymphovascular Invasion  Not identified   Neurotropism  Not identified   Tumor-Infiltrating Lymphocytes  Present, nonbrisk   Tumor Regression  Not identified   MARGINS     Margin Status for Invasive Melanoma  All margins negative for invasive melanoma   Margin Status for Melanoma in situ  Melanoma in situ present at margin   Margin(s) Involved by Melanoma in Situ  Peripheral: Extends to the peripheral margins.     PATHOLOGIC STAGE CLASSIFICATION (pTNM, AJCC 8th Edition)     pT Category  pT3a   Comment(s)  Tumor block is blocks A1-A3.       Radiology      Clinical Images  03/27/25

## 2025-04-28 NOTE — PROGRESS NOTES
ASSESSMENT AND PLAN  Elana Cox is a 59 y.o. female who is now s/p wide local excision and sentinel lymph node biopsy on 4/14/2025. On final pathology margins were clear and lymph node benign, pT3a N0, stage IIa.  We discussed that this is great news and does not require further workup or follow-up with us at this time. We advised dermatology follow-up for regular exams and to return to our clinic with any concerns in the future. The patient expressed understanding.       Armen Castaneda MD  Assistant Professor of Surgery  Division of Surgical Oncology  [phone]  [email or web address]        SUBJECTIVE  Elana Cox is a 59 y.o. female who presents for a postoperative clinic visit.  Referring provider: Shira Haynes  Diagnosis: melanoma  Location: right upper arm  Thickness: 2.2 mm  Surgery: Wide local excision and sentinel lymph node biopsy on 4/14/2025  Postoperative issues: none      Physical exam  Incisions are clean, dry, and intact    Surgical Pathology  FINAL DIAGNOSIS   A: NODE, RIGHT AXILLARY SENTINEL LYMPH NODE #1 (COUNT-587), BIOPSY:  CAPSULAR NEVUS WITH FOCAL SOX-10 AND MELAN-A STAINING, SEE NOTE.     Note:  Microscopic examination reveals a lymph node.  In the capsule, there are benign-appearing melanocytes that stain with antibodies against Melan-A and SOX-10.  There is a focal area of Melan-A staining and a separate area of focal SOX-10-positive staining.  The primary melanoma in [id number] was reviewed, and a benign lymph node is favored.       B: SKIN, WIDE LOCAL EXCISION RIGHT UPPER ARM:  CHANGES CONSISTENT WITH PREVIOUS PROCEDURE, INKED MARGINS FREE IN PLANES OF SECTIONS EXAMINED, WITHOUT RESIDUAL ATYPICAL MELANOCYTIC NEOPLASM SEEN.

## 2025-04-29 ENCOUNTER — OFFICE VISIT (OUTPATIENT)
Dept: SURGICAL ONCOLOGY | Facility: CLINIC | Age: 60
End: 2025-04-29
Payer: MEDICARE

## 2025-04-29 VITALS
OXYGEN SATURATION: 96 % | SYSTOLIC BLOOD PRESSURE: 128 MMHG | DIASTOLIC BLOOD PRESSURE: 88 MMHG | WEIGHT: 210 LBS | RESPIRATION RATE: 18 BRPM | BODY MASS INDEX: 34.95 KG/M2 | TEMPERATURE: 98.4 F | HEART RATE: 70 BPM

## 2025-04-29 DIAGNOSIS — C43.61 MELANOMA OF RIGHT UPPER ARM (MULTI): Primary | ICD-10-CM

## 2025-04-29 PROCEDURE — 99211 OFF/OP EST MAY X REQ PHY/QHP: CPT | Performed by: STUDENT IN AN ORGANIZED HEALTH CARE EDUCATION/TRAINING PROGRAM

## 2025-04-29 PROCEDURE — 1036F TOBACCO NON-USER: CPT | Performed by: STUDENT IN AN ORGANIZED HEALTH CARE EDUCATION/TRAINING PROGRAM

## 2025-04-29 PROCEDURE — G8433 SCR FOR DEP NOT CPT DOC RSN: HCPCS | Performed by: STUDENT IN AN ORGANIZED HEALTH CARE EDUCATION/TRAINING PROGRAM

## 2025-04-29 ASSESSMENT — PAIN SCALES - GENERAL: PAINLEVEL_OUTOF10: 4

## 2025-04-29 NOTE — LETTER
April 29, 2025     Shira Haynes MD  [address]  [address]  [facility] [address]    Patient: Elana Cox   YOB: 1965   Date of Visit: 4/29/2025       Dear Dr. Shira Haynes MD:    Thank you for referring Elana Cox to me for evaluation. Below are my notes for this consultation.  If you have questions, please do not hesitate to call me. I look forward to following your patient along with you.       Sincerely,     Armen Castaneda MD      CC: No Recipients  ______________________________________________________________________________________    ASSESSMENT AND PLAN  Elana Cox is a 59 y.o. female who is now s/p wide local excision and sentinel lymph node biopsy on 4/14/2025. On final pathology margins were clear and lymph node benign, pT3a N0, stage IIa.  We discussed that this is great news and does not require further workup or follow-up with us at this time. We advised dermatology follow-up for regular exams and to return to our clinic with any concerns in the future. The patient expressed understanding.       Armen Castaneda MD  Assistant Professor of Surgery  Division of Surgical Oncology  [phone]  [email or web address]        SUBJECTIVE  Elana Cox is a 59 y.o. female who presents for a postoperative clinic visit.  Referring provider: Shira Haynes  Diagnosis: melanoma  Location: right upper arm  Thickness: 2.2 mm  Surgery: Wide local excision and sentinel lymph node biopsy on 4/14/2025  Postoperative issues: none      Physical exam  Incisions are clean, dry, and intact    Surgical Pathology  FINAL DIAGNOSIS   A: NODE, RIGHT AXILLARY SENTINEL LYMPH NODE #1 ([id number]-060), BIOPSY:  CAPSULAR NEVUS WITH FOCAL SOX-10 AND MELAN-A STAINING, SEE NOTE.     Note:  Microscopic examination reveals a lymph node.  In the capsule, there are benign-appearing melanocytes that stain with antibodies against Melan-A and SOX-10.  There is a focal area of Melan-A staining and  a separate area of focal SOX-10-positive staining.  The primary melanoma in [id number] was reviewed, and a benign lymph node is favored.       B: SKIN, WIDE LOCAL EXCISION RIGHT UPPER ARM:  CHANGES CONSISTENT WITH PREVIOUS PROCEDURE, INKED MARGINS FREE IN PLANES OF SECTIONS EXAMINED, WITHOUT RESIDUAL ATYPICAL MELANOCYTIC NEOPLASM SEEN.
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
## 2025-05-16 ENCOUNTER — APPOINTMENT (OUTPATIENT)
Dept: BEHAVIORAL HEALTH | Facility: CLINIC | Age: 60
End: 2025-05-16
Payer: MEDICARE

## 2025-05-16 VITALS
DIASTOLIC BLOOD PRESSURE: 65 MMHG | SYSTOLIC BLOOD PRESSURE: 126 MMHG | WEIGHT: 213 LBS | TEMPERATURE: 98.3 F | HEART RATE: 67 BPM | BODY MASS INDEX: 35.45 KG/M2 | RESPIRATION RATE: 16 BRPM

## 2025-05-16 DIAGNOSIS — F33.1 MODERATE EPISODE OF RECURRENT MAJOR DEPRESSIVE DISORDER: Primary | ICD-10-CM

## 2025-05-16 PROBLEM — G93.41 METABOLIC ENCEPHALOPATHY: Status: RESOLVED | Noted: 2022-03-11 | Resolved: 2025-05-16

## 2025-05-16 PROBLEM — R21 RASH: Status: RESOLVED | Noted: 2024-03-22 | Resolved: 2025-05-16

## 2025-05-16 PROBLEM — J06.9 ACUTE UPPER RESPIRATORY INFECTION: Status: RESOLVED | Noted: 2024-03-22 | Resolved: 2025-05-16

## 2025-05-16 PROBLEM — M79.602 PAIN OF LEFT UPPER EXTREMITY: Status: RESOLVED | Noted: 2022-02-14 | Resolved: 2025-05-16

## 2025-05-16 PROBLEM — R41.82 ALTERED MENTAL STATUS: Status: RESOLVED | Noted: 2023-12-28 | Resolved: 2025-05-16

## 2025-05-16 PROBLEM — R41.82 MENTAL STATUS, DECREASED: Status: RESOLVED | Noted: 2023-12-28 | Resolved: 2025-05-16

## 2025-05-16 PROBLEM — G40.919 BREAKTHROUGH SEIZURE (MULTI): Status: RESOLVED | Noted: 2022-03-01 | Resolved: 2025-05-16

## 2025-05-16 PROBLEM — L25.9 CONTACT DERMATITIS: Status: RESOLVED | Noted: 2023-11-01 | Resolved: 2025-05-16

## 2025-05-16 PROBLEM — J18.9 COMMUNITY ACQUIRED PNEUMONIA: Status: RESOLVED | Noted: 2023-12-27 | Resolved: 2025-05-16

## 2025-05-16 PROCEDURE — 1036F TOBACCO NON-USER: CPT | Performed by: STUDENT IN AN ORGANIZED HEALTH CARE EDUCATION/TRAINING PROGRAM

## 2025-05-16 PROCEDURE — 90792 PSYCH DIAG EVAL W/MED SRVCS: CPT | Performed by: STUDENT IN AN ORGANIZED HEALTH CARE EDUCATION/TRAINING PROGRAM

## 2025-05-16 RX ORDER — SERTRALINE HYDROCHLORIDE 100 MG/1
150 TABLET, FILM COATED ORAL DAILY
Qty: 45 TABLET | Refills: 2 | Status: SHIPPED | OUTPATIENT
Start: 2025-05-16 | End: 2025-08-14

## 2025-05-16 ASSESSMENT — PAIN SCALES - GENERAL: PAINLEVEL_OUTOF10: 0-NO PAIN

## 2025-05-30 ENCOUNTER — APPOINTMENT (OUTPATIENT)
Dept: RADIOLOGY | Facility: HOSPITAL | Age: 60
End: 2025-05-30
Payer: MEDICARE

## 2025-05-30 ENCOUNTER — TELEMEDICINE (OUTPATIENT)
Dept: NEUROLOGY | Facility: HOSPITAL | Age: 60
End: 2025-05-30
Payer: MEDICARE

## 2025-05-30 ENCOUNTER — HOSPITAL ENCOUNTER (OUTPATIENT)
Facility: HOSPITAL | Age: 60
Setting detail: OBSERVATION
Discharge: HOME | End: 2025-05-31
Attending: EMERGENCY MEDICINE | Admitting: EMERGENCY MEDICINE
Payer: MEDICARE

## 2025-05-30 DIAGNOSIS — N39.0 BACTERIAL URINARY INFECTION: ICD-10-CM

## 2025-05-30 DIAGNOSIS — N39.0 URINARY TRACT BACTERIAL INFECTIONS: Primary | ICD-10-CM

## 2025-05-30 DIAGNOSIS — R41.0 DISORIENTATION: Primary | ICD-10-CM

## 2025-05-30 DIAGNOSIS — Z86.73 HISTORY OF STROKE: ICD-10-CM

## 2025-05-30 DIAGNOSIS — A49.9 BACTERIAL URINARY INFECTION: ICD-10-CM

## 2025-05-30 DIAGNOSIS — A49.9 URINARY TRACT BACTERIAL INFECTIONS: Primary | ICD-10-CM

## 2025-05-30 LAB
ALBUMIN SERPL BCP-MCNC: 4.7 G/DL (ref 3.4–5)
ALP SERPL-CCNC: 98 U/L (ref 33–136)
ALT SERPL W P-5'-P-CCNC: 10 U/L (ref 7–45)
ANION GAP SERPL CALC-SCNC: 15 MMOL/L (ref 10–20)
APPEARANCE UR: ABNORMAL
AST SERPL W P-5'-P-CCNC: 11 U/L (ref 9–39)
BACTERIA #/AREA URNS AUTO: ABNORMAL /HPF
BASOPHILS # BLD AUTO: 0.08 X10*3/UL (ref 0–0.1)
BASOPHILS NFR BLD AUTO: 0.9 %
BILIRUB SERPL-MCNC: 0.4 MG/DL (ref 0–1.2)
BILIRUB UR STRIP.AUTO-MCNC: NEGATIVE MG/DL
BUN SERPL-MCNC: 21 MG/DL (ref 6–23)
CALCIUM SERPL-MCNC: 9.7 MG/DL (ref 8.6–10.6)
CHLORIDE SERPL-SCNC: 105 MMOL/L (ref 98–107)
CO2 SERPL-SCNC: 24 MMOL/L (ref 21–32)
COLOR UR: ABNORMAL
CREAT SERPL-MCNC: 1.03 MG/DL (ref 0.5–1.05)
EGFRCR SERPLBLD CKD-EPI 2021: 62 ML/MIN/1.73M*2
EOSINOPHIL # BLD AUTO: 0.12 X10*3/UL (ref 0–0.7)
EOSINOPHIL NFR BLD AUTO: 1.4 %
ERYTHROCYTE [DISTWIDTH] IN BLOOD BY AUTOMATED COUNT: 13.6 % (ref 11.5–14.5)
GLUCOSE BLD MANUAL STRIP-MCNC: 150 MG/DL (ref 74–99)
GLUCOSE SERPL-MCNC: 110 MG/DL (ref 74–99)
GLUCOSE UR STRIP.AUTO-MCNC: NORMAL MG/DL
HCT VFR BLD AUTO: 39.9 % (ref 36–46)
HGB BLD-MCNC: 13.1 G/DL (ref 12–16)
IMM GRANULOCYTES # BLD AUTO: 0.02 X10*3/UL (ref 0–0.7)
IMM GRANULOCYTES NFR BLD AUTO: 0.2 % (ref 0–0.9)
KETONES UR STRIP.AUTO-MCNC: NEGATIVE MG/DL
LACTATE SERPL-SCNC: 1.2 MMOL/L (ref 0.4–2)
LAMOTRIGINE SERPL-MCNC: 11.3 UG/ML (ref 2.5–15)
LEUKOCYTE ESTERASE UR QL STRIP.AUTO: ABNORMAL
LYMPHOCYTES # BLD AUTO: 2.59 X10*3/UL (ref 1.2–4.8)
LYMPHOCYTES NFR BLD AUTO: 29.6 %
MAGNESIUM SERPL-MCNC: 2.38 MG/DL (ref 1.6–2.4)
MCH RBC QN AUTO: 26.9 PG (ref 26–34)
MCHC RBC AUTO-ENTMCNC: 32.8 G/DL (ref 32–36)
MCV RBC AUTO: 82 FL (ref 80–100)
MONOCYTES # BLD AUTO: 0.52 X10*3/UL (ref 0.1–1)
MONOCYTES NFR BLD AUTO: 5.9 %
MUCOUS THREADS #/AREA URNS AUTO: ABNORMAL /LPF
NEUTROPHILS # BLD AUTO: 5.42 X10*3/UL (ref 1.2–7.7)
NEUTROPHILS NFR BLD AUTO: 62 %
NITRITE UR QL STRIP.AUTO: NEGATIVE
NRBC BLD-RTO: 0 /100 WBCS (ref 0–0)
PH UR STRIP.AUTO: 5.5 [PH]
PLATELET # BLD AUTO: 333 X10*3/UL (ref 150–450)
POTASSIUM SERPL-SCNC: 3.7 MMOL/L (ref 3.5–5.3)
PROT SERPL-MCNC: 7.8 G/DL (ref 6.4–8.2)
PROT UR STRIP.AUTO-MCNC: ABNORMAL MG/DL
RBC # BLD AUTO: 4.87 X10*6/UL (ref 4–5.2)
RBC # UR STRIP.AUTO: ABNORMAL MG/DL
RBC #/AREA URNS AUTO: >20 /HPF
SODIUM SERPL-SCNC: 140 MMOL/L (ref 136–145)
SP GR UR STRIP.AUTO: 1.03
SQUAMOUS #/AREA URNS AUTO: ABNORMAL /HPF
UROBILINOGEN UR STRIP.AUTO-MCNC: NORMAL MG/DL
WBC # BLD AUTO: 8.8 X10*3/UL (ref 4.4–11.3)
WBC #/AREA URNS AUTO: >50 /HPF

## 2025-05-30 PROCEDURE — 87075 CULTR BACTERIA EXCEPT BLOOD: CPT | Mod: 59 | Performed by: EMERGENCY MEDICINE

## 2025-05-30 PROCEDURE — 2500000001 HC RX 250 WO HCPCS SELF ADMINISTERED DRUGS (ALT 637 FOR MEDICARE OP): Performed by: NURSE PRACTITIONER

## 2025-05-30 PROCEDURE — 99285 EMERGENCY DEPT VISIT HI MDM: CPT | Mod: 25 | Performed by: EMERGENCY MEDICINE

## 2025-05-30 PROCEDURE — 80175 DRUG SCREEN QUAN LAMOTRIGINE: CPT

## 2025-05-30 PROCEDURE — 36415 COLL VENOUS BLD VENIPUNCTURE: CPT | Performed by: EMERGENCY MEDICINE

## 2025-05-30 PROCEDURE — 81001 URINALYSIS AUTO W/SCOPE: CPT | Performed by: EMERGENCY MEDICINE

## 2025-05-30 PROCEDURE — 87086 URINE CULTURE/COLONY COUNT: CPT | Performed by: EMERGENCY MEDICINE

## 2025-05-30 PROCEDURE — 83605 ASSAY OF LACTIC ACID: CPT | Performed by: EMERGENCY MEDICINE

## 2025-05-30 PROCEDURE — G0378 HOSPITAL OBSERVATION PER HR: HCPCS

## 2025-05-30 PROCEDURE — 83735 ASSAY OF MAGNESIUM: CPT | Performed by: EMERGENCY MEDICINE

## 2025-05-30 PROCEDURE — 70498 CT ANGIOGRAPHY NECK: CPT | Performed by: STUDENT IN AN ORGANIZED HEALTH CARE EDUCATION/TRAINING PROGRAM

## 2025-05-30 PROCEDURE — 96361 HYDRATE IV INFUSION ADD-ON: CPT

## 2025-05-30 PROCEDURE — 85025 COMPLETE CBC W/AUTO DIFF WBC: CPT | Performed by: EMERGENCY MEDICINE

## 2025-05-30 PROCEDURE — 80053 COMPREHEN METABOLIC PANEL: CPT | Performed by: EMERGENCY MEDICINE

## 2025-05-30 PROCEDURE — 82947 ASSAY GLUCOSE BLOOD QUANT: CPT

## 2025-05-30 PROCEDURE — 96365 THER/PROPH/DIAG IV INF INIT: CPT | Mod: 59

## 2025-05-30 PROCEDURE — 99223 1ST HOSP IP/OBS HIGH 75: CPT | Performed by: EMERGENCY MEDICINE

## 2025-05-30 PROCEDURE — 2500000002 HC RX 250 W HCPCS SELF ADMINISTERED DRUGS (ALT 637 FOR MEDICARE OP, ALT 636 FOR OP/ED): Performed by: NURSE PRACTITIONER

## 2025-05-30 PROCEDURE — 99214 OFFICE O/P EST MOD 30 MIN: CPT | Performed by: NURSE PRACTITIONER

## 2025-05-30 PROCEDURE — 70498 CT ANGIOGRAPHY NECK: CPT

## 2025-05-30 PROCEDURE — 2550000001 HC RX 255 CONTRASTS: Performed by: EMERGENCY MEDICINE

## 2025-05-30 PROCEDURE — 2500000004 HC RX 250 GENERAL PHARMACY W/ HCPCS (ALT 636 FOR OP/ED)

## 2025-05-30 PROCEDURE — 70496 CT ANGIOGRAPHY HEAD: CPT | Performed by: STUDENT IN AN ORGANIZED HEALTH CARE EDUCATION/TRAINING PROGRAM

## 2025-05-30 RX ORDER — PANTOPRAZOLE SODIUM 40 MG/1
40 TABLET, DELAYED RELEASE ORAL DAILY
Status: DISCONTINUED | OUTPATIENT
Start: 2025-05-31 | End: 2025-05-31 | Stop reason: HOSPADM

## 2025-05-30 RX ORDER — ATORVASTATIN CALCIUM 20 MG/1
20 TABLET, FILM COATED ORAL DAILY
Status: DISCONTINUED | OUTPATIENT
Start: 2025-05-31 | End: 2025-05-31 | Stop reason: HOSPADM

## 2025-05-30 RX ORDER — SERTRALINE HYDROCHLORIDE 50 MG/1
50 TABLET, FILM COATED ORAL EVERY MORNING
Status: DISCONTINUED | OUTPATIENT
Start: 2025-05-31 | End: 2025-05-31 | Stop reason: HOSPADM

## 2025-05-30 RX ORDER — LAMOTRIGINE 100 MG/1
200 TABLET ORAL 2 TIMES DAILY
Status: DISCONTINUED | OUTPATIENT
Start: 2025-05-30 | End: 2025-05-31 | Stop reason: HOSPADM

## 2025-05-30 RX ORDER — LEVOTHYROXINE SODIUM 137 UG/1
137 TABLET ORAL EVERY MORNING
Status: DISCONTINUED | OUTPATIENT
Start: 2025-05-31 | End: 2025-05-31 | Stop reason: HOSPADM

## 2025-05-30 RX ORDER — CEFTRIAXONE 1 G/50ML
1 INJECTION, SOLUTION INTRAVENOUS ONCE
Status: COMPLETED | OUTPATIENT
Start: 2025-05-30 | End: 2025-05-30

## 2025-05-30 RX ORDER — SERTRALINE HYDROCHLORIDE 50 MG/1
100 TABLET, FILM COATED ORAL EVERY EVENING
Status: DISCONTINUED | OUTPATIENT
Start: 2025-05-30 | End: 2025-05-31 | Stop reason: HOSPADM

## 2025-05-30 RX ADMIN — SODIUM CHLORIDE, POTASSIUM CHLORIDE, SODIUM LACTATE AND CALCIUM CHLORIDE 1000 ML: 600; 310; 30; 20 INJECTION, SOLUTION INTRAVENOUS at 19:39

## 2025-05-30 RX ADMIN — SERTRALINE 100 MG: 50 TABLET, FILM COATED ORAL at 23:36

## 2025-05-30 RX ADMIN — LAMOTRIGINE 200 MG: 100 TABLET ORAL at 23:35

## 2025-05-30 RX ADMIN — CEFTRIAXONE SODIUM 1 G: 1 INJECTION, SOLUTION INTRAVENOUS at 22:31

## 2025-05-30 RX ADMIN — IOHEXOL 90 ML: 350 INJECTION, SOLUTION INTRAVENOUS at 21:47

## 2025-05-30 ASSESSMENT — LIFESTYLE VARIABLES
TOTAL SCORE: 0
HAVE PEOPLE ANNOYED YOU BY CRITICIZING YOUR DRINKING: NO
EVER FELT BAD OR GUILTY ABOUT YOUR DRINKING: NO
EVER HAD A DRINK FIRST THING IN THE MORNING TO STEADY YOUR NERVES TO GET RID OF A HANGOVER: NO
HAVE YOU EVER FELT YOU SHOULD CUT DOWN ON YOUR DRINKING: NO

## 2025-05-30 ASSESSMENT — COLUMBIA-SUICIDE SEVERITY RATING SCALE - C-SSRS
1. IN THE PAST MONTH, HAVE YOU WISHED YOU WERE DEAD OR WISHED YOU COULD GO TO SLEEP AND NOT WAKE UP?: NO
2. HAVE YOU ACTUALLY HAD ANY THOUGHTS OF KILLING YOURSELF?: NO
6. HAVE YOU EVER DONE ANYTHING, STARTED TO DO ANYTHING, OR PREPARED TO DO ANYTHING TO END YOUR LIFE?: NO

## 2025-05-30 NOTE — ED PROVIDER NOTES
History of Present Illness     History provided by: Patient and Significant Other  Limitations to History: aphasia  External Records Reviewed with Brief Summary: Outpatient visit from neurology clinic via telemedicine same day, demonstrates concern for possible UTI.    HPI:  Elana Cox is a 59 y.o. female With a past medical history of hypertension, previous large left MCA stroke with resultant epilepsy who presents today for altered mental status.  Per patient's , has noticed that she has had a sort of fluctuating mental status over the past few days, does occasionally appear less responsive, however, has had no changes from her stroke symptoms perspective.  He does endorse that she has aphasia at baseline, is able to answer yes or no questions, however, is unable to provide other one-word answers.  He denies any nausea or vomiting, she has had some urinary incontinence, however.  Patient was evaluated by a member of the neurology team, who felt that the patient may have an infection and recommended they come to the emergency department for evaluation.  She denies any new numbness weakness or tingling in her arms or legs, difficulty swallowing or speaking she denies any headaches, changes in her vision or hearing.  Denies any fevers at home, does endorse urinary frequency as well as urinary incontinence.  Denies any blood in her urine, no changes vaginal discharge.    Physical Exam   Triage vitals:  T 36.4 °C (97.5 °F)  HR 74  /62  RR 18  O2 99 % None (Room air)    Physical Exam  Vitals and nursing note reviewed.   Constitutional:       General: She is not in acute distress.     Appearance: Normal appearance. She is well-developed. She is not ill-appearing or diaphoretic.   HENT:      Head: Normocephalic and atraumatic.      Mouth/Throat:      Mouth: Mucous membranes are moist.      Pharynx: No oropharyngeal exudate or posterior oropharyngeal erythema.   Eyes:      General: No scleral  icterus.     Extraocular Movements: Extraocular movements intact.      Pupils: Pupils are equal, round, and reactive to light.   Cardiovascular:      Rate and Rhythm: Normal rate and regular rhythm.      Pulses: Normal pulses.      Heart sounds: Normal heart sounds. No murmur heard.     No gallop.   Pulmonary:      Effort: Pulmonary effort is normal. No respiratory distress.      Breath sounds: Normal breath sounds. No stridor. No wheezing, rhonchi or rales.   Abdominal:      General: Bowel sounds are normal. There is no distension.      Palpations: Abdomen is soft. There is no mass.      Tenderness: There is no abdominal tenderness.      Hernia: No hernia is present.   Musculoskeletal:         General: No swelling, deformity or signs of injury. Normal range of motion.      Cervical back: Normal range of motion and neck supple. No tenderness.   Skin:     General: Skin is warm.      Capillary Refill: Capillary refill takes less than 2 seconds.      Findings: No erythema, lesion or rash.   Neurological:      General: No focal deficit present.      Mental Status: She is alert. Mental status is at baseline.      GCS: GCS eye subscore is 4. GCS verbal subscore is 2. GCS motor subscore is 6.      Cranial Nerves: No cranial nerve deficit.      Comments: Aphasic at baseline, no changes in sensation or strength   Psychiatric:         Mood and Affect: Mood normal.         Behavior: Behavior normal.          Medical Decision Making & ED Course   Medical Decision Makin y.o. female with a past medical history of hypertension previous large left MCA stroke with resultant aphasia and complex seizure disorder who presents today for altered mental status.  Given the waxing and waning nature as well as the patient's urinary symptoms, I do believe the patient likely does have a UTI which is most likely cause of her change in mental status.  She does not appear to have any changes in her exam from a neurologic perspective.   Additionally, the patient has been having symptoms for days, so I do not believe that the patient requires activation as a bat, which was also Dr. Barillas's impression and triage.  Given this, we will get labs including a urinalysis.  Given the patient's aphasia and somewhat poor personal history, we will get a CT angio of the head neck to confirm patient does not have any evidence of new LVO or other pathology, however, patient would not be within a treatment window, so does not need done ASAP.  Patient's labs here demonstrate a glucose of 110, otherwise a normal CMP with no elevation in her lactate.  Patient does have a white count of 8.8.  We did get blood cultures, patient does appear to have a UTI with 500 leuk esterase as well as greater than 50 whites greater than 20 reds as well as 1+ bacteria.  Given this, we will treat with ceftriaxone, which she has previously tolerated.  Patient did receive her CT angio of the head and neck, does not appear to have any acute changes on my interpretation.  Patient be signed out to the oncoming team pending either medicine observation admission or CDU admission depending on patient's CT angio read.  ----      Differential diagnoses considered include but are not limited to: Delirium in the setting of infection, new stroke, seizure,     Social Determinants of Health which Significantly Impact Care: None identified     EKG Independent Interpretation: EKG not obtained    Independent Result Review and Interpretation: Relevant laboratory and radiographic results were reviewed and independently interpreted by myself.  As necessary, they are commented on in the ED Course.    Chronic conditions affecting the patient's care: As documented above in [facility]    The patient was discussed with the following consultants/services: CDU provider who wanted to wait until CT angio read prior to accepting patient    Care Considerations: As documented above in [facility]    ED Course:  Diagnoses as of  05/30/25 2216   Disorientation   Bacterial urinary infection     Disposition   Patient was signed out to Dr. Jerez at 2300 pending completion of their work-up.  Please see the next provider's transition of care note for the remainder of the patient's care.     Procedures   Procedures    Patient seen and discussed with ED attending physician.    Aaron Chris MD  Emergency Medicine       Aaron Chris MD  Resident  05/30/25 2803

## 2025-05-30 NOTE — ED TRIAGE NOTES
Pt presents to the ED for complaints of altered mental status that started a few hours ago. Pt was brought in by her  who states his wife was not responding to him and not answering questions appropriately. Pt is confused in triage and unable to say exactly why she is here in the hospital. Pt is unable to stay focused during exam but is able to stand without assistance.  states that her only known history was seizures but when asked the patient denies having a history of seizures. Pt was evaluated by  at bedside and it was determined a BAT did not need to be called

## 2025-05-30 NOTE — PROGRESS NOTES
Subjective     Virtual or Telephone Consent    While technically available, the patient was unable or unwilling to consent to connect via audio/video telehealth technology; therefore, I performed this visit using a real-time audio only connection between Elana Cox & COLT Morrison.  Verbal consent was requested and obtained from Elana Cox on this date, 05/30/25 for a telehealth visit and the patient's location was confirmed at the time of the visit.      Elana Cox is a 59 y.o. 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 Seizures prior to her surgery were daily dialeptic seizures with automatisms. Since surgery she has had progressive cognitive decline mainly affecting her language function. Mild postop aphasia can occur after dominant temporal lobectomy however is usually transient and improves.   She was first seen in our office in 05/2024 for an initial consultation mainly for progressive language function decline since her epilepsy surgery.Her language function has remained the same since then. She has been doing speech therapy as well but with very modest improvement.   She was referred her to the [facility] to rule out electrographic seizures as a cause for her progressive language decline. EEG showed left temporal slowing without seizures and she did not have any clinical episodes.      Interval History:  Son states for ~2 weeks her mental status has been declining. she is more confused, she is having events  of repeated movements or tasks, for example when asking to put her shoes one she will  her foot over and over without ever putting her shoe on. She is not able to feed herself and previously she was able to and she is having episodes of incontinent. Her states her urine has a very strong smell and dark appearance. She is staring off and he will have to yell to get her attention.       Current Outpatient Medications:     atorvastatin (Lipitor) 20 mg tablet, Take 1 tablet (20 mg) by mouth once daily., Disp: 90 tablet, Rfl: 3    clobetasol (Temovate) 0.05 % ointment, Apply topically 2 times a day., Disp: 30 g, Rfl: 1    lamoTRIgine (LaMICtal) 200 mg tablet, Take 1 tablet (200 mg) by mouth 2 times a day., Disp: 180 tablet, Rfl: 3    levETIRAcetam (Keppra) 500 mg tablet, Take 1 tablet (500 mg) by mouth 2 times a day. (Patient not taking: Reported on 4/8/2025), Disp: 180 tablet, Rfl: 3    levothyroxine (Synthroid, Levoxyl) 125 mcg tablet, Take 1 tablet (125 mcg) by mouth early in the morning.. Take on an empty stomach at the same time each day, either 30 to 60 minutes prior to breakfast, Disp: , Rfl:     pantoprazole (ProtoNix) 40 mg EC tablet, Take 1 tablet (40 mg) by mouth once daily., Disp: 90 tablet, Rfl: 1    sertraline (Zoloft) 100 mg tablet, Take 1.5 tablets (150 mg) by mouth once daily., Disp: 45 tablet, Rfl: 2    valACYclovir (Valtrex) 1 gram tablet, TAKE 1 TABLET (1,000 MG) BY MOUTH ONCE DAILY. (Patient not taking: Reported on 4/29/2025), Disp: 90 tablet, Rfl: 1  Allergies   Allergen Reactions    Penicillins Anaphylaxis     Other reaction(s): Anaphylactic Shock    Sulfamethoxazole-Trimethoprim Unknown       Review of Systems  As per HPI, otherwise all other systems have been reviewed are negative for complaint.      Objective   There were no vitals taken for this visit.    Neurological Exam  Physical Exam  Significant expressive>receptive aphasia, son provides history today     Assessment/Plan   Elana TAVERAS  "Kenny is a 59 y.o. 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 Son states she has had a decline over the last 2 weeks and are concerned for \"focal seizures\" based on the description of foul smelling, dark urine and incontinence I suspect she has a UTI, which could be causing altered mental status and possibly dialeptic seizures. I would like her to come to the ED for further evaluation and treatment, son is agreeable. Of note she had not had a seizure since Dec 2023.       4D CLASSIFICATION  Type: EPE  Semiology: Aura -> Automotor (D)       Lateralizing signs: none      Onset: 2020      Frequency:  multiple per day prior to surgery, last in Dec 2023  EZ: Left temporal  Etiology: Hippocampal sclerosis, FCD Type 1  Comorbidities: hypothyroidism  Current AEDs: -200              "

## 2025-05-31 ENCOUNTER — PHARMACY VISIT (OUTPATIENT)
Dept: PHARMACY | Facility: CLINIC | Age: 60
End: 2025-05-31
Payer: COMMERCIAL

## 2025-05-31 VITALS
BODY MASS INDEX: 35.49 KG/M2 | RESPIRATION RATE: 16 BRPM | DIASTOLIC BLOOD PRESSURE: 82 MMHG | WEIGHT: 213 LBS | OXYGEN SATURATION: 99 % | SYSTOLIC BLOOD PRESSURE: 136 MMHG | TEMPERATURE: 97.7 F | HEIGHT: 65 IN | HEART RATE: 55 BPM

## 2025-05-31 LAB — HOLD SPECIMEN: NORMAL

## 2025-05-31 PROCEDURE — 2500000002 HC RX 250 W HCPCS SELF ADMINISTERED DRUGS (ALT 637 FOR MEDICARE OP, ALT 636 FOR OP/ED): Performed by: NURSE PRACTITIONER

## 2025-05-31 PROCEDURE — G0378 HOSPITAL OBSERVATION PER HR: HCPCS

## 2025-05-31 PROCEDURE — RXMED WILLOW AMBULATORY MEDICATION CHARGE

## 2025-05-31 PROCEDURE — 96361 HYDRATE IV INFUSION ADD-ON: CPT

## 2025-05-31 PROCEDURE — 2500000001 HC RX 250 WO HCPCS SELF ADMINISTERED DRUGS (ALT 637 FOR MEDICARE OP): Performed by: NURSE PRACTITIONER

## 2025-05-31 PROCEDURE — 2500000004 HC RX 250 GENERAL PHARMACY W/ HCPCS (ALT 636 FOR OP/ED): Performed by: NURSE PRACTITIONER

## 2025-05-31 PROCEDURE — 99238 HOSP IP/OBS DSCHRG MGMT 30/<: CPT | Performed by: PHYSICIAN ASSISTANT

## 2025-05-31 RX ORDER — NITROFURANTOIN 25; 75 MG/1; MG/1
100 CAPSULE ORAL 2 TIMES DAILY
Qty: 10 CAPSULE | Refills: 0 | Status: SHIPPED | OUTPATIENT
Start: 2025-05-31 | End: 2025-06-05

## 2025-05-31 RX ORDER — ACETAMINOPHEN 160 MG/5ML
650 SOLUTION ORAL EVERY 4 HOURS PRN
Status: DISCONTINUED | OUTPATIENT
Start: 2025-05-31 | End: 2025-05-31 | Stop reason: HOSPADM

## 2025-05-31 RX ORDER — SODIUM CHLORIDE, SODIUM LACTATE, POTASSIUM CHLORIDE, CALCIUM CHLORIDE 600; 310; 30; 20 MG/100ML; MG/100ML; MG/100ML; MG/100ML
100 INJECTION, SOLUTION INTRAVENOUS CONTINUOUS
Status: DISCONTINUED | OUTPATIENT
Start: 2025-05-31 | End: 2025-05-31 | Stop reason: HOSPADM

## 2025-05-31 RX ORDER — ACETAMINOPHEN 325 MG/1
650 TABLET ORAL EVERY 4 HOURS PRN
Status: DISCONTINUED | OUTPATIENT
Start: 2025-05-31 | End: 2025-05-31 | Stop reason: HOSPADM

## 2025-05-31 RX ORDER — NITROFURANTOIN 25; 75 MG/1; MG/1
100 CAPSULE ORAL EVERY 12 HOURS SCHEDULED
Status: DISCONTINUED | OUTPATIENT
Start: 2025-05-31 | End: 2025-05-31 | Stop reason: HOSPADM

## 2025-05-31 RX ORDER — ACETAMINOPHEN 650 MG/1
650 SUPPOSITORY RECTAL EVERY 4 HOURS PRN
Status: DISCONTINUED | OUTPATIENT
Start: 2025-05-31 | End: 2025-05-31 | Stop reason: HOSPADM

## 2025-05-31 RX ORDER — POLYETHYLENE GLYCOL 3350 17 G/17G
17 POWDER, FOR SOLUTION ORAL DAILY PRN
Status: DISCONTINUED | OUTPATIENT
Start: 2025-05-31 | End: 2025-05-31 | Stop reason: HOSPADM

## 2025-05-31 RX ADMIN — ATORVASTATIN CALCIUM 20 MG: 20 TABLET, FILM COATED ORAL at 08:32

## 2025-05-31 RX ADMIN — SODIUM CHLORIDE, POTASSIUM CHLORIDE, SODIUM LACTATE AND CALCIUM CHLORIDE 100 ML/HR: 600; 310; 30; 20 INJECTION, SOLUTION INTRAVENOUS at 01:46

## 2025-05-31 RX ADMIN — PANTOPRAZOLE SODIUM 40 MG: 40 TABLET, DELAYED RELEASE ORAL at 08:32

## 2025-05-31 RX ADMIN — LEVOTHYROXINE SODIUM 137 MCG: 137 TABLET ORAL at 08:32

## 2025-05-31 RX ADMIN — SERTRALINE 50 MG: 50 TABLET, FILM COATED ORAL at 08:33

## 2025-05-31 RX ADMIN — LAMOTRIGINE 200 MG: 100 TABLET ORAL at 08:32

## 2025-05-31 NOTE — DISCHARGE SUMMARY
Disposition Note  [facility] CLINICAL DECISION  Patient: Elana Cox  MRN: [id number]  : 1965  Date of Evaluation: 2025  ED Provider: Phu Ravi PA-C      Limitations to history: None  Independent Historian: No,  is at the bedside  External Records Reviewed: Recent and relevant inpatient and outpatient notes in EMR      Subjective:    Elana Cox is a 59 y.o. female has undergone comprehensive diagnostic evaluation and therapeutic management in accordance with the CDU guidelines for urinary tract infection. Based on Mrs. Cox's clinical response and diagnostic information during this period of observation, it has been determined that the patient will be discharged.    The acute evaluation included:  Orders Placed This Encounter   Procedures    Blood Culture    Blood Culture    Urine Culture    CT angio head and neck w and wo IV contrast    CBC and Auto Differential    Comprehensive Metabolic Panel    Lactate    Urinalysis with Reflex Culture and Microscopic    Urinalysis with Reflex Culture and Microscopic    Extra Urine Gray Tube    Magnesium    Lamotrigine    Microscopic Only, Urine    Adult diet Regular    Vital Signs    Activity (specify) No Restrictions    Vital Signs    Apply sequential compression device    Full code    Pulse oximetry, continuous    POCT GLUCOSE    Electrocardiogram, 12-lead PRN ACS symptoms    Insert and maintain peripheral IV    Saline lock IV    Initiate Observation Send to CDU         Placed in observation at: 2305       Past History   Medical History[1]  Surgical History[2]  Social History[3]      Medications/Allergies     Current Discharge Medication List        CONTINUE these medications which have NOT CHANGED    Details   atorvastatin (Lipitor) 20 mg tablet Take 1 tablet (20 mg) by mouth once daily.  Qty: 90 tablet, Refills: 3    Associated Diagnoses: Hyperlipidemia, unspecified hyperlipidemia type      lamoTRIgine (LaMICtal) 200 mg  tablet Take 1 tablet (200 mg) by mouth 2 times a day.  Qty: 180 tablet, Refills: 3    Associated Diagnoses: Seizure (Multi)      levothyroxine (Synthroid, Levoxyl) 137 mcg tablet Take 1 tablet (137 mcg) by mouth once daily in the morning.      pantoprazole (ProtoNix) 40 mg EC tablet Take 1 tablet (40 mg) by mouth once daily.  Qty: 90 tablet, Refills: 1    Associated Diagnoses: Obesity, unspecified      sertraline (Zoloft) 100 mg tablet Take 1.5 tablets (150 mg) by mouth once daily.  Qty: 45 tablet, Refills: 2    Associated Diagnoses: Moderate episode of recurrent major depressive disorder      clobetasol (Temovate) 0.05 % ointment Apply topically 2 times a day.  Qty: 30 g, Refills: 1    Associated Diagnoses: Contact dermatitis, unspecified contact dermatitis type, unspecified trigger      levETIRAcetam (Keppra) 500 mg tablet Take 1 tablet (500 mg) by mouth 2 times a day.  Qty: 180 tablet, Refills: 3    Associated Diagnoses: Seizure (Multi)      valACYclovir (Valtrex) 1 gram tablet TAKE 1 TABLET (1,000 MG) BY MOUTH ONCE DAILY.  Qty: 90 tablet, Refills: 1    Associated Diagnoses: Cold sore           Allergies[4]      Review of Systems  All systems reviewed and otherwise negative, except as stated above in HPI.    Diagnostics reviewed by Phu Ravi PA-C     Labs:  Results for orders placed or performed during the hospital encounter of 05/30/25   POCT GLUCOSE    Collection Time: 05/30/25  4:54 PM   Result Value Ref Range    POCT Glucose 150 (H) 74 - 99 mg/dL   Blood Culture    Collection Time: 05/30/25  6:14 PM    Specimen: Peripheral Venipuncture; Blood culture   Result Value Ref Range    Blood Culture Loaded on Instrument - Culture in progress    Blood Culture    Collection Time: 05/30/25  6:14 PM    Specimen: Peripheral Venipuncture; Blood culture   Result Value Ref Range    Blood Culture Loaded on Instrument - Culture in progress    CBC and Auto Differential    Collection Time: 05/30/25  6:14 PM   Result Value  Ref Range    WBC 8.8 4.4 - 11.3 x10*3/uL    nRBC 0.0 0.0 - 0.0 /100 WBCs    RBC 4.87 4.00 - 5.20 x10*6/uL    Hemoglobin 13.1 12.0 - 16.0 g/dL    Hematocrit 39.9 36.0 - 46.0 %    MCV 82 80 - 100 fL    MCH 26.9 26.0 - 34.0 pg    MCHC 32.8 32.0 - 36.0 g/dL    RDW 13.6 11.5 - 14.5 %    Platelets 333 150 - 450 x10*3/uL    Neutrophils % 62.0 40.0 - 80.0 %    Immature Granulocytes %, Automated 0.2 0.0 - 0.9 %    Lymphocytes % 29.6 13.0 - 44.0 %    Monocytes % 5.9 2.0 - 10.0 %    Eosinophils % 1.4 0.0 - 6.0 %    Basophils % 0.9 0.0 - 2.0 %    Neutrophils Absolute 5.42 1.20 - 7.70 x10*3/uL    Immature Granulocytes Absolute, Automated 0.02 0.00 - 0.70 x10*3/uL    Lymphocytes Absolute 2.59 1.20 - 4.80 x10*3/uL    Monocytes Absolute 0.52 0.10 - 1.00 x10*3/uL    Eosinophils Absolute 0.12 0.00 - 0.70 x10*3/uL    Basophils Absolute 0.08 0.00 - 0.10 x10*3/uL   Comprehensive Metabolic Panel    Collection Time: 05/30/25  6:14 PM   Result Value Ref Range    Glucose 110 (H) 74 - 99 mg/dL    Sodium 140 136 - 145 mmol/L    Potassium 3.7 3.5 - 5.3 mmol/L    Chloride 105 98 - 107 mmol/L    Bicarbonate 24 21 - 32 mmol/L    Anion Gap 15 10 - 20 mmol/L    Urea Nitrogen 21 6 - 23 mg/dL    Creatinine 1.03 0.50 - 1.05 mg/dL    eGFR 62 >60 mL/min/1.73m*2    Calcium 9.7 8.6 - 10.6 mg/dL    Albumin 4.7 3.4 - 5.0 g/dL    Alkaline Phosphatase 98 33 - 136 U/L    Total Protein 7.8 6.4 - 8.2 g/dL    AST 11 9 - 39 U/L    Bilirubin, Total 0.4 0.0 - 1.2 mg/dL    ALT 10 7 - 45 U/L   Lactate    Collection Time: 05/30/25  6:14 PM   Result Value Ref Range    Lactate 1.2 0.4 - 2.0 mmol/L   Magnesium    Collection Time: 05/30/25  6:14 PM   Result Value Ref Range    Magnesium 2.38 1.60 - 2.40 mg/dL   Lamotrigine    Collection Time: 05/30/25  7:28 PM   Result Value Ref Range    Lamotrigine  11.3 2.5 - 15.0 ug/mL   Urinalysis with Reflex Culture and Microscopic    Collection Time: 05/30/25  8:30 PM   Result Value Ref Range    Color, Urine Light-Orange (N)  Light-Yellow, Yellow, Dark-Yellow    Appearance, Urine Ex.Turbid (N) Clear    Specific Gravity, Urine 1.032 1.005 - 1.035    pH, Urine 5.5 5.0, 5.5, 6.0, 6.5, 7.0, 7.5, 8.0    Protein, Urine 30 (1+) (A) NEGATIVE, 10 (TRACE), 20 (TRACE) mg/dL    Glucose, Urine Normal Normal mg/dL    Blood, Urine 0.1 (1+) (A) NEGATIVE mg/dL    Ketones, Urine NEGATIVE NEGATIVE mg/dL    Bilirubin, Urine NEGATIVE NEGATIVE mg/dL    Urobilinogen, Urine Normal Normal mg/dL    Nitrite, Urine NEGATIVE NEGATIVE    Leukocyte Esterase, Urine 500 Theresa/uL (A) NEGATIVE   Extra Urine Gray Tube    Collection Time: 05/30/25  8:30 PM   Result Value Ref Range    Extra Tube Hold for add-ons.    Microscopic Only, Urine    Collection Time: 05/30/25  8:30 PM   Result Value Ref Range    WBC, Urine >50 (A) 1-5, NONE /HPF    RBC, Urine >20 (A) NONE, 1-2, 3-5 /HPF    Squamous Epithelial Cells, Urine 10-25 (FEW) Reference range not established. /HPF    Bacteria, Urine 1+ (A) NONE SEEN /HPF    Mucus, Urine 2+ Reference range not established. /LPF     Radiographs:  CT angio head and neck w and wo IV contrast   Final Result        CT HEAD:        1. No acute intracranial abnormality. If there is clinical concern   for an ischemic stroke, consider further evaluation with MRI.   2. Postsurgical changes from left temporal craniotomy with stable   encephalomalacia/gliosis of the left temporal lobe.             CTA HEAD & NECK: Suboptimal contrast opacification.   1. Diminutive left M2/M3 vessels in keeping with a chronic left MCA   territory infarct.   2. Otherwise, no acute large vessel intracranial or extracranial   occlusion or significant stenosis.   3. Multilevel spondylotic changes of the cervical spine with varying   high-grade neural foraminal stenosis.             I personally reviewed the images/study and I agree with the findings   as stated by Mehreen Inman DO, PGY-3. This study was interpreted   at [facility],  "[place], [place].        MACRO:   None.        Signed by: Chaparro Aragon 5/31/2025 12:39 AM   Dictation workstation:   [id number]              Physical Exam     Visit Vitals  /82 (BP Location: Left arm, Patient Position: Sitting)   Pulse 55   Temp 36.5 °C (97.7 °F)   Resp 16   Ht 1.651 m (5' 5\")   Wt 96.6 kg (213 lb)   SpO2 99%   BMI 35.45 kg/m²   OB Status Hysterectomy   Smoking Status Former   BSA 2.1 m²       Physical exam  VS: As documented in the triage note and EMR flowsheet from this visit were reviewed.    General: Patient is AAOx3, normally developed and nourished, answers questions appropriately    HEENT: head normocephalic, atraumatic, PERRLA, EOMs intact, oropharynx without erythema or exudate, buccal mucosa intact without lesions, nose is patent bilateral    Neck: supple, full ROM, negative for lymphadenopathy, JVD, thyromegaly, tracheal deviation, nuchal rigidity    Pulmonary: Clear to auscultation bilaterally, No wheezing, rales, or rhonchi, no accessory muscle use, able to speak full clear sentences    Cardiac: Normal rate and rhythm, no murmurs, rubs or gallops    GI: soft, non-tender, non-distended, normoactive bowelsounds in all four quadrants, no masses or organomegaly, no guarding or CVA tenderness noted    Musculoskeletal: full weight bearing, FOY, no joint effusions, clubbing or edema noted    Skin: Warm, dry, intact, no lesions or rashes noted, turgor is good.    Neuro: patient follow commands, cranial nerves 2-12 grossly intact, aphasic at baseline. No acute focal deficits.    Psych: Appropriate mood and affect for situation      Consultants  1) N/A      Impression and Plan    In summary, Elana Cox has been cared for according to the standard [facility] Clinical Decision Unit observation protocol for Bacterial UTI. This extended period of observation was specifically required to determine the need for hospitalization. Prior to discharge from " observation, the final physical exam is documented above.     Significant events during the course of observation based on the goals of the clinical problem list include:   1) remained stable    Based on the patient's condition and test results, the patient will be discharged    Total length of observation was 21 hours. Dr. Beard is the CDU disposition attending.      Discharge Diagnosis  Bacterial UTI    Issues Requiring Follow-Up  See below    Discharge Meds     Your medication list        START taking these medications        Instructions Last Dose Given Next Dose Due   nitrofurantoin (macrocrystal-monohydrate) 100 mg capsule  Commonly known as: Macrobid      Take 1 capsule (100 mg) by mouth 2 times a day for 5 days.              ASK your doctor about these medications        Instructions Last Dose Given Next Dose Due   atorvastatin 20 mg tablet  Commonly known as: Lipitor      Take 1 tablet (20 mg) by mouth once daily.       clobetasol 0.05 % ointment  Commonly known as: Temovate      Apply topically 2 times a day.       lamoTRIgine 200 mg tablet  Commonly known as: LaMICtal      Take 1 tablet (200 mg) by mouth 2 times a day.       levETIRAcetam 500 mg tablet  Commonly known as: Keppra      Take 1 tablet (500 mg) by mouth 2 times a day.       levothyroxine 137 mcg tablet  Commonly known as: Synthroid, Levoxyl           pantoprazole 40 mg EC tablet  Commonly known as: ProtoNix      Take 1 tablet (40 mg) by mouth once daily.       sertraline 100 mg tablet  Commonly known as: Zoloft      Take 1.5 tablets (150 mg) by mouth once daily.       valACYclovir 1 gram tablet  Commonly known as: Valtrex      TAKE 1 TABLET (1,000 MG) BY MOUTH ONCE DAILY.                 Where to Get Your Medications        These medications were sent to [facility]  [address], [address], [facility] [address]      Hours: 8AM to 6PM Mon-Fri, 8AM to 4PM Sat, 9AM to 1PM Sun Phone: [phone]   nitrofurantoin  (macrocrystal-monohydrate) 100 mg capsule         Test Results Pending At Discharge  Pending Labs       Order Current Status    Urine Culture In process    Blood Culture Preliminary result    Blood Culture Preliminary result            Hospital Course   Mrs. Cox was admitted to the clinical decision unit for UTI.  Medical workup included laboratory studies and CT head.  Diagnostic information was obtained, reviewed and discussed with the patient and  at bedside. CBC was unremarkable. Comprehensive metabolic panel with a glucose of 110, otherwise unremarkable. Urinalysis with 500 leukocytes, greater than 50 white cells and 1+ bacteria. Urine culture was obtained, pending results. Patient received 1 dose of ceftriaxone 1 g IV for UTI. Patient was sent to the CDU for further observation.  While in the CDU patient's mental status improved.   states that she is back to baseline.  Urine culture is pending at this time.  Macrobid for UTI was ordered meds to bed.  She has otherwise remained stable during her CDU course.  Plan to discharge home with instructions to follow-up with her PCP in the next 2 weeks.  She will be instructed to return to the emergency department with new or worsening symptoms or for any other concerns.  All questions were answered patient and  are in agreement with medical plan of care and was discharged in stable condition.    Assessment/plan  1) Urinary tract infection  - Macrobid 100 mg p.o. twice daily x 5 days  - Follow-up with PCP in 2 weeks  - Return to the emergency department with new or worsening symptoms or for other concerns    Outpatient Follow-Up  Future Appointments   Date Time Provider Department Center   6/27/2025  9:00 AM Shira Haynes MD [id number] [place]   7/25/2025  9:00 AM Lilly Laughlin MD [id number] [facility]   9/10/2025  9:30 AM Donal Wiggins MD [id number] [facility]         Phu Ravi PA-C                [1]   Past Medical History:  Diagnosis  Date    Cognitive impairment     Delayed emergence from general anesthesia     GERD (gastroesophageal reflux disease)     Hyperlipidemia     Hypothyroidism     Major depressive disorder     Melanoma of right upper arm (Multi)     Memory impairment     Migraines     Seizures (Multi)     EPILEPSY   [2]   Past Surgical History:  Procedure Laterality Date    BRAIN SURGERY      COLONOSCOPY      HERNIA REPAIR      HYSTERECTOMY      SKIN BIOPSY     [3]   Social History  Socioeconomic History    Marital status:    Tobacco Use    Smoking status: Former     Current packs/day: 0.00     Types: Cigarettes     Quit date:      Years since quittin.4     Passive exposure: Past    Smokeless tobacco: Never   Vaping Use    Vaping status: Never Used   Substance and Sexual Activity    Alcohol use: Not Currently    Drug use: Never    Sexual activity: Defer     Birth control/protection: Post-menopausal     Comment: HYSTER     Social Drivers of Health     Financial Resource Strain: Low Risk  (2024)    Overall Financial Resource Strain (CARDIA)     Difficulty of Paying Living Expenses: Not very hard   Food Insecurity: No Food Insecurity (2024)    Received from [facility]    Hunger Vital Sign     Worried About Running Out of Food in the Last Year: Never true     Ran Out of Food in the Last Year: Never true   Transportation Needs: No Transportation Needs (2024)    Received from [facility]    PRAPARE - Transportation     Lack of Transportation (Medical): No     Lack of Transportation (Non-Medical): No   Housing Stability: Unknown (2024)    Received from [facility]    Housing Stability Vital Sign     Unable to Pay for Housing in the Last Year: No     Homeless in the Last Year: No   [4]   Allergies  Allergen Reactions    Penicillins Anaphylaxis     Other reaction(s): Anaphylactic Shock    Sulfamethoxazole-Trimethoprim Unknown

## 2025-05-31 NOTE — PROGRESS NOTES
Elana Cox is a 59 y.o. female on day 0 of admission presenting with Bacterial UTI.    SW met with patient and spouse at bedside to discuss home aide resources. Spouse stated they are not currently interested in home services. Briefly discussed info for Rent a Daughter and Passport Program. Due to patients detention plan they do not qualify for Passport with [facility]. Spouse said he is primary care taker for patient at this time.

## 2025-05-31 NOTE — H&P
History and Physical  [facility] CLINICAL DECISION  Patient: Elana Cox  MRN: [id number]  : 1965  Date of Evaluation: 2025  ED Provider: COLT Gray      Limitations to history: mental status  Independent Historian: patient,  not present at bedside on arrival to CDU  External Records Reviewed: outside records, inpatient notes, ed records      Patient History:  Elana Cox is a 59 y.o. female with a PMH of hypertension, previous large left MCA stroke with resultant epilepsy, COPD, GERD, melanoma, migraines, temporal lobectomy which cause decreased mental status and ability to care for herself who presented to the emergency room for confusion.  According to the EMR notes, patient had a fluctuating mental status over the last couple of days.  Patient has aphasia at baseline.  Denies any nausea, vomiting, fevers or chills.  Patient states that she had dysuria over the last couple of days.  While in the emergency room lab work was obtained.  CBC was unremarkable.  Comprehensive metabolic panel with a glucose of 110, otherwise unremarkable.  Urinalysis with 500 leukocytes, greater than 50 white cells and 1+ bacteria.  Urine culture was obtained, pending results.  Patient received 1 dose of ceftriaxone 1 g IV for UTI.  Patient was sent to the CDU for further observation.    PMH: Hypertension, left MCA stroke, epilepsy, COPD, GERD, melanoma, migraines  PSH: temporal lobectomy  Allergies: Reviewed per EMR  Social HX: Former smoker, denies alcohol or drug use.  Family HX: No family history pertinent to current presenting problem  Medications: Reviewed per EMR    The acute evaluation included:  Orders Placed This Encounter   Procedures    Blood Culture    Blood Culture    Urine Culture    CT angio head and neck w and wo IV contrast    CBC and Auto Differential    Comprehensive Metabolic Panel    Lactate    Urinalysis with Reflex Culture and Microscopic    Urinalysis with  Reflex Culture and Microscopic    Extra Urine Gray Tube    Magnesium    Lamotrigine    Microscopic Only, Urine    Adult diet Regular    Vital Signs    Activity (specify) No Restrictions    Vital Signs    Apply sequential compression device    Full code    Pulse oximetry, continuous    POCT GLUCOSE    Electrocardiogram, 12-lead PRN ACS symptoms    Insert and maintain peripheral IV    Saline lock IV    Initiate Observation Send to CDU       I reviewed the below labs and imaging as ordered by the ED provider:  CT angio head and neck w and wo IV contrast   Final Result        CT HEAD:        1. No acute intracranial abnormality. If there is clinical concern   for an ischemic stroke, consider further evaluation with MRI.   2. Postsurgical changes from left temporal craniotomy with stable   encephalomalacia/gliosis of the left temporal lobe.             CTA HEAD & NECK: Suboptimal contrast opacification.   1. Diminutive left M2/M3 vessels in keeping with a chronic left MCA   territory infarct.   2. Otherwise, no acute large vessel intracranial or extracranial   occlusion or significant stenosis.   3. Multilevel spondylotic changes of the cervical spine with varying   high-grade neural foraminal stenosis.             I personally reviewed the images/study and I agree with the findings   as stated by Mehreen Inman DO, PGY-3. This study was interpreted   at [facility], [place], [place].        MACRO:   None.        Signed by: Chaparro Aragon 5/31/2025 12:39 AM   Dictation workstation:   [id number]          Labs Reviewed   COMPREHENSIVE METABOLIC PANEL - Abnormal       Result Value    Glucose 110 (*)     Sodium 140      Potassium 3.7      Chloride 105      Bicarbonate 24      Anion Gap 15      Urea Nitrogen 21      Creatinine 1.03      eGFR 62      Calcium 9.7      Albumin 4.7      Alkaline Phosphatase 98      Total Protein 7.8      AST 11      Bilirubin, Total 0.4      ALT 10      URINALYSIS WITH REFLEX CULTURE AND MICROSCOPIC - Abnormal    Color, Urine Light-Orange (*)     Appearance, Urine Ex.Turbid (*)     Specific Gravity, Urine 1.032      pH, Urine 5.5      Protein, Urine 30 (1+) (*)     Glucose, Urine Normal      Blood, Urine 0.1 (1+) (*)     Ketones, Urine NEGATIVE      Bilirubin, Urine NEGATIVE      Urobilinogen, Urine Normal      Nitrite, Urine NEGATIVE      Leukocyte Esterase, Urine 500 Theresa/uL (*)    MICROSCOPIC ONLY, URINE - Abnormal    WBC, Urine >50 (*)     RBC, Urine >20 (*)     Squamous Epithelial Cells, Urine 10-25 (FEW)      Bacteria, Urine 1+ (*)     Mucus, Urine 2+     POCT GLUCOSE - Abnormal    POCT Glucose 150 (*)    BLOOD CULTURE - Normal    Blood Culture Loaded on Instrument - Culture in progress     BLOOD CULTURE - Normal    Blood Culture Loaded on Instrument - Culture in progress     LACTATE - Normal    Lactate 1.2      Narrative:     Venipuncture immediately after or during the administration of Metamizole may lead to falsely low results. Testing should be performed immediately prior to Metamizole dosing.   MAGNESIUM - Normal    Magnesium 2.38     LAMOTRIGINE - Normal    Lamotrigine  11.3     URINE CULTURE   CBC WITH AUTO DIFFERENTIAL    WBC 8.8      nRBC 0.0      RBC 4.87      Hemoglobin 13.1      Hematocrit 39.9      MCV 82      MCH 26.9      MCHC 32.8      RDW 13.6      Platelets 333      Neutrophils % 62.0      Immature Granulocytes %, Automated 0.2      Lymphocytes % 29.6      Monocytes % 5.9      Eosinophils % 1.4      Basophils % 0.9      Neutrophils Absolute 5.42      Immature Granulocytes Absolute, Automated 0.02      Lymphocytes Absolute 2.59      Monocytes Absolute 0.52      Eosinophils Absolute 0.12      Basophils Absolute 0.08     URINALYSIS WITH REFLEX CULTURE AND MICROSCOPIC    Narrative:     The following orders were created for panel order Urinalysis with Reflex Culture and Microscopic.  Procedure                               Abnormality          "Status                     ---------                               -----------         ------                     Urinalysis with Reflex C...[[id number]]  Abnormal            Final result               Extra Urine Gray Tube[[id number]]                            In process                   Please view results for these tests on the individual orders.   EXTRA URINE GRAY TUBE           After discussion with the ED provider, a decision was made to admit the patient to the Clinical Decision Unit.    Upon admission to the Clinical Decision Unit, the patient's vital signs were temperature 36.2, blood pressure 143/89, heart rate 52, respiratory rate 14 and pulse ox 99%.    Past History   Medical History[1]  Surgical History[2]  Social History[3]      Medications/Allergies     Previous Medications    ATORVASTATIN (LIPITOR) 20 MG TABLET    Take 1 tablet (20 mg) by mouth once daily.    CLOBETASOL (TEMOVATE) 0.05 % OINTMENT    Apply topically 2 times a day.    LAMOTRIGINE (LAMICTAL) 200 MG TABLET    Take 1 tablet (200 mg) by mouth 2 times a day.    LEVETIRACETAM (KEPPRA) 500 MG TABLET    Take 1 tablet (500 mg) by mouth 2 times a day.    LEVOTHYROXINE (SYNTHROID, LEVOXYL) 137 MCG TABLET    Take 1 tablet (137 mcg) by mouth once daily in the morning.    PANTOPRAZOLE (PROTONIX) 40 MG EC TABLET    Take 1 tablet (40 mg) by mouth once daily.    SERTRALINE (ZOLOFT) 100 MG TABLET    Take 1.5 tablets (150 mg) by mouth once daily.    VALACYCLOVIR (VALTREX) 1 GRAM TABLET    TAKE 1 TABLET (1,000 MG) BY MOUTH ONCE DAILY.     Allergies[4]      Review of Systems  All systems reviewed and otherwise negative, except as stated above in HPI.      Physical Exam     Visit Vitals  /89 (BP Location: Right arm, Patient Position: Lying)   Pulse 52   Temp 36.2 °C (97.2 °F) (Temporal)   Resp 14   Ht 1.651 m (5' 5\")   Wt 96.6 kg (213 lb)   SpO2 99%   BMI 35.45 kg/m²   OB Status Hysterectomy   Smoking Status Former   BSA 2.1 m²         Physical " Exam:    Appearance: Alert,  cooperative,  in no acute distress.     Skin: Intact,  dry skin, no lesions, rash, petechiae or purpura.     Eyes: PERRLA, EOMs intact.    ENT: Hearing grossly intact. External auditory canals patent, tympanic membranes intact with visible landmarks. Nares patent, mucus membranes moist. Dentition without lesions. Pharynx clear, uvula midline.     Neck: Supple, without meningismus.     Pulmonary: Clear bilaterally with good chest wall excursion. No rales, rhonchi or wheezing. No accessory muscle use or stridor.    Cardiac: Normal S1, S2 without murmur, rub, gallop or extrasystole. No JVD, Carotids without bruits.    Abdomen: Soft, nontender, active bowel sounds.  No palpable organomegaly.  No rebound or guarding.  No CVA tenderness.    Musculoskeletal: Full range of motion. No deformity. Pulses full and equal. No cyanosis, clubbing, or edema.    Psychiatric: Appropriate mood and affect.         Impression and Plan  In summary, Elana Cox is admitted to the [facility] Clinical Decision Unit for UTI, confusion. Dr. Clark is the CDU admission attending.    This patient has been risk-stratified based on available history, physical exam, and related study findings. Admission to the observation status for further diagnosis/treatment/monitoring of UTI/confusion is warranted clinically. This extended period of observation is specifically required to determine the need for hospitalization.     The goals of this admission based on the patient’s clinical problem list are:  1) Symptomatic improvement of symptoms  2) Stable vital signs    UTI:  - Patient received Ceftriaxone 1 gram IV in the ED  - Urine culture obtained, pending results  - Tylenol prn for pain  - LR @ 100ml/hour    Confusion:  -Aphasia is baseline per reports  - CT angio of the head showed no acute intracranial abnormalities  - Will continue to monitor    When met, appropriate disposition will be  arranged.  Jamia Aparicio APRN CNP            [1]   Past Medical History:  Diagnosis Date    Cognitive impairment     Delayed emergence from general anesthesia     GERD (gastroesophageal reflux disease)     Hyperlipidemia     Hypothyroidism     Major depressive disorder     Melanoma of right upper arm (Multi)     Memory impairment     Migraines     Seizures (Multi)     EPILEPSY   [2]   Past Surgical History:  Procedure Laterality Date    BRAIN SURGERY      COLONOSCOPY      HERNIA REPAIR      HYSTERECTOMY      SKIN BIOPSY     [3]   Social History  Socioeconomic History    Marital status:    Tobacco Use    Smoking status: Former     Current packs/day: 0.00     Types: Cigarettes     Quit date:      Years since quittin.4     Passive exposure: Past    Smokeless tobacco: Never   Vaping Use    Vaping status: Never Used   Substance and Sexual Activity    Alcohol use: Not Currently    Drug use: Never    Sexual activity: Defer     Birth control/protection: Post-menopausal     Comment: HYSTER     Social Drivers of Health     Financial Resource Strain: Low Risk  (2024)    Overall Financial Resource Strain (CARDIA)     Difficulty of Paying Living Expenses: Not very hard   Food Insecurity: No Food Insecurity (2024)    Received from [facility]    Hunger Vital Sign     Worried About Running Out of Food in the Last Year: Never true     Ran Out of Food in the Last Year: Never true   Transportation Needs: No Transportation Needs (2024)    Received from [facility]    PRAPARE - Transportation     Lack of Transportation (Medical): No     Lack of Transportation (Non-Medical): No   Housing Stability: Unknown (2024)    Received from [facility]    Housing Stability Vital Sign     Unable to Pay for Housing in the Last Year: No     Homeless in the Last Year: No   [4]   Allergies  Allergen Reactions    Penicillins Anaphylaxis     Other reaction(s): Anaphylactic Shock     Sulfamethoxazole-Trimethoprim Unknown

## 2025-05-31 NOTE — PROGRESS NOTES
Pharmacy Medication History Review    Elana Cox is a 59 y.o. female admitted for Bacterial UTI. Pharmacy reviewed the patient's [id number] medications and allergies for accuracy.    The list below reflects the updated PTA list.   Prior to Admission Medications   Prescriptions Last Dose  / Chart Reported?   atorvastatin (Lipitor) 20 mg tablet 5/30/2025 Morning Yes   Sig: Take 1 tablet (20 mg) by mouth once daily.  In AM    clobetasol (Temovate) 0.05 % ointment Unknown Yes   Sig: Apply topically 2 times a day as needed.     lamoTRIgine (LaMICtal) 200 mg tablet 5/30/2025 Morning Yes   Sig: Take 1 tablet (200 mg) by mouth 2 times a day.   levETIRAcetam (Keppra) 500 mg tablet Unknown Yes   Sig: Take 1 tablet (500 mg) by mouth 2 times a day.  No Longer Taking.   Patient not taking: Reported on 4/8/2025   levothyroxine (Synthroid, Levoxyl) 137 mcg tablet 5/30/2025 Morning Yes   Sig: Take 1 tablet (137 mcg) by mouth   once daily in the morning.   pantoprazole (ProtoNix) 40 mg EC tablet 5/30/2025 Morning Yes   Sig: Take 1 tablet (40 mg) by mouth once daily.  In AM   sertraline (Zoloft) 100 mg tablet 5/30/2025 Morning (50 mg) Yes   Sig: Take 1.5 tablets (150 mg) by mouth once daily.  Per , taken as:  --> 1/2 (half) tablet in morning  --> 1 tablet in evening.    valACYclovir (Valtrex) 1 gram tablet Unknown Yes   Sig: TAKE 1 TABLET (1,000 MG) BY MOUTH ONCE DAILY.  Patient not taking: Reported on 4/8/2025              The list below reflects the updated allergy list. Please review each documented allergy for additional clarification and justification.  Allergies  Reviewed by Sam Aaron IV, RN on 5/30/2025        Severity Reactions Comments    Penicillins High Anaphylaxis Other reaction(s): Anaphylactic Shock    Sulfamethoxazole-trimethoprim Not Specified Unknown           Patient accepts M2B at discharge.   Local Pharmacy if Needed:  [facility]/pharmacy #[id number] [place], [place] - [address] AT  [address]  P: [phone]    Sources used to complete the med history include:  Interview ( at bedside provides history; good historian;  he has bag of home meds at bedside; we go thru each.)  Current [facility] Epic PTA Medication List  Epic Dispense Report (Pharmacy Fill Activity)  Chart Review (, [facility]); Recent/Past Encounters  OARRS (nothing recent; last 4/14/25 tramadol #5/5day)    Below are additional concerns with the patient's PTA list:  See additional comments/notes in PTA Table above.    CHANGED in PTA List:  Clobetasol Ointment to PRN use.  Levothyroxine 125 mcg --> 137 mcg (most recent dose)  Sertraline 150 mg daily -->  reported by  as 50 mg AM, 100 mg bedtime  Specified times of day taken in sigs    ADDED to PTA List:  None    REMOVED/Marked Not Taking in PTA List:  Keppra previously marked not taking  Valtrex previously marked not taking     ----------------------------------  Jose Hickman, PharmD, [facility]  Transitions of Care Pharmacist  Medication reconciliation complete  Please reach out via Epic Secure Chat ([id number]) for questions,   or if no response call [phone].

## 2025-06-01 LAB
BACTERIA BLD CULT: NORMAL
BACTERIA BLD CULT: NORMAL
BACTERIA UR CULT: NORMAL

## 2025-06-02 ENCOUNTER — PATIENT OUTREACH (OUTPATIENT)
Dept: PRIMARY CARE | Facility: CLINIC | Age: 60
End: 2025-06-02
Payer: MEDICARE

## 2025-06-02 NOTE — PROGRESS NOTES
Discharge Facility:  [facility]    Discharge Diagnosis:  Disorientation  Bacterial urinary infection    Admission Date:  5/30/25 observation   Discharge Date:   5/31/25    PCP Appointment Date:  TBD-I am unable to make an appt due to no openings . Message sent to office staff requesting assistance. As well as encourged patient's  to call today to schedule on voicemail.     Specialist Appointment Date:   Appointment with Shira Haynes MD (06/27/2025)   Appointment with Lilly Laughlin MD (07/25/2025)   Appointment with Donal Wiggins MD (09/10/2025)   Hospital Encounter and Summary Linked: Yes    Two attempts were made to reach patient within two business days after discharge. I left voicemail to introduce myself and the TCM program to Elana Cox. I encouraged patient to contact office or myself for follow up appt. I gave my contact information to return my call so we can go over discharge instructions and see if I can assist in anyway.

## 2025-06-03 LAB
BACTERIA BLD CULT: NORMAL
BACTERIA BLD CULT: NORMAL

## 2025-06-17 ENCOUNTER — PATIENT OUTREACH (OUTPATIENT)
Dept: PRIMARY CARE | Facility: CLINIC | Age: 60
End: 2025-06-17
Payer: MEDICARE

## 2025-06-17 DIAGNOSIS — E78.5 HYPERLIPIDEMIA, UNSPECIFIED HYPERLIPIDEMIA TYPE: ICD-10-CM

## 2025-06-17 RX ORDER — ATORVASTATIN CALCIUM 20 MG/1
20 TABLET, FILM COATED ORAL DAILY
Qty: 90 TABLET | Refills: 0 | Status: SHIPPED | OUTPATIENT
Start: 2025-06-17

## 2025-06-17 NOTE — PROGRESS NOTES
TCM outreach - family member answered the phone and said everything is going fine but she is not there right now.

## 2025-06-25 DIAGNOSIS — Z12.31 ENCOUNTER FOR SCREENING MAMMOGRAM FOR BREAST CANCER: ICD-10-CM

## 2025-06-27 ENCOUNTER — APPOINTMENT (OUTPATIENT)
Dept: DERMATOLOGY | Facility: CLINIC | Age: 60
End: 2025-06-27
Payer: MEDICARE

## 2025-06-27 DIAGNOSIS — D18.01 HEMANGIOMA OF SKIN: ICD-10-CM

## 2025-06-27 DIAGNOSIS — L57.9 SKIN CHANGES DUE TO CHRONIC EXPOSURE TO NONIONIZING RADIATION: ICD-10-CM

## 2025-06-27 DIAGNOSIS — L81.4 LENTIGO: ICD-10-CM

## 2025-06-27 DIAGNOSIS — L82.1 SEBORRHEIC KERATOSIS: ICD-10-CM

## 2025-06-27 DIAGNOSIS — D22.9 MELANOCYTIC NEVUS, UNSPECIFIED LOCATION: ICD-10-CM

## 2025-06-27 DIAGNOSIS — Z85.820 PERSONAL HISTORY OF MALIGNANT MELANOMA OF SKIN: Primary | ICD-10-CM

## 2025-06-27 DIAGNOSIS — Z12.83 SCREENING EXAM FOR SKIN CANCER: ICD-10-CM

## 2025-06-27 PROCEDURE — G2211 COMPLEX E/M VISIT ADD ON: HCPCS | Performed by: STUDENT IN AN ORGANIZED HEALTH CARE EDUCATION/TRAINING PROGRAM

## 2025-06-27 PROCEDURE — 99213 OFFICE O/P EST LOW 20 MIN: CPT | Performed by: STUDENT IN AN ORGANIZED HEALTH CARE EDUCATION/TRAINING PROGRAM

## 2025-06-27 NOTE — Clinical Note
There is no evidence of recurrence on clinical examination today, reassurance was provided to the patient. Discussed that hx of skin cancer increases risk of developing future skin cancer and total body skin exams are warranted every 3 months

## 2025-06-27 NOTE — PROGRESS NOTES
Subjective     Elana Cox is a 60 y.o. female who presents for the following: Skin Check (FBSE. Hx of malignant melanoma on right upper arm-posterior. No concerns today. Patient is accompanied by her son.).          Review of Systems:  No other skin or systemic complaints other than what is documented elsewhere in the note.    The following portions of the chart were reviewed this encounter and updated as appropriate:         Skin Cancer History  Biopsy Log Book  Biopsied Type Location Status   3/27/25 Melanoma Right Upper Arm - Posterior Patient/Caregiver Informed       Additional History      Specialty Problems          Dermatology Problems    Melanoma of right upper arm (Multi)        Objective   Well appearing patient in no apparent distress; mood and affect are within normal limits.    A full examination was performed including scalp, head, eyes, ears, nose, lips, neck, chest, axillae, abdomen, back, buttocks, bilateral upper extremities, bilateral lower extremities, hands, feet, fingers, toes, fingernails, and toenails. All findings within normal limits unless otherwise noted below.    Assessment/Plan   Skin Exam  1. PERSONAL HISTORY OF MALIGNANT MELANOMA OF SKIN  Generalized  Scar with no evidence of recurrence.    Hx Stage:  cIIA ([id number]) s/p WLE with 2 cm margins and SLNB, with pathology showing no residual neoplasm, with margins clear. SLNB examined one right axillary sentinel node showing benign-appearing melanocytes staining Melan-A and SOX-10 with benign lymph node favored (0/1). Adequately surgically treated.     Lymph Nodes  Lymph Nodes-Clinical Exam:   Regional - Negative  Distant - Negative  There is no evidence of recurrence on clinical examination today, reassurance was provided to the patient. Discussed that hx of skin cancer increases risk of developing future skin cancer and total body skin exams are warranted every 3 months  2. SKIN CHANGES DUE TO CHRONIC EXPOSURE TO NONIONIZING  RADIATION  Generalized  Mottled pigmentation, telangiectasias and brown reticular macules in sun exposed areas on the face, extremities, trunk  The risk of chronic, cumulative sun damage and risk of development of skin cancer was reviewed with the patient today. Discussed important of sun protection with sun protective clothing and/or broad spectrum sunscreen spf 30 or above.  Warning signs of skin cancer were reviewed. Patient to contact office should they notice any new or changing pre-existing skin lesion.  Related Procedures  Follow Up In Dermatology - Established Patient  3. MELANOCYTIC NEVUS, UNSPECIFIED LOCATION  Generalized  Benign to slightly atypical appearing brown pigmented macules and papules  Clinically benign appearing nevi, reassurance provided to the patient today.  4. LENTIGO  Generalized  Scattered tan macules in sun-exposed areas.  Benign nature of these skin lesions reviewed and relation to sun exposure discussed. Reassurance provided. Reviewed warning signs of skin cancer.  5. SCREENING EXAM FOR SKIN CANCER  Generalized  6. SEBORRHEIC KERATOSIS  Generalized  Stuck on verrucous, tan-brown papules and plaques.    The benign nature of these skin lesions were reviewed with the patient today and reassurance was provided. Patient advised to return for f/u if the lesions change in size, shape, color, become painful, tender, itch or bleed.  7. HEMANGIOMA OF SKIN  Generalized  Bright red papules  Discussed benign nature of condition, reassured. Reviewed warning signs of skin cancer with patient.    Patient has history of malignant melanoma. Follow up in 3 months for long-term monitoring for skin cancer recurrence and metastasis.

## 2025-06-27 NOTE — Clinical Note
Scar with no evidence of recurrence.    Hx Stage:  cIIA ([id number]) s/p WLE with 2 cm margins and SLNB, with pathology showing no residual neoplasm, with margins clear. SLNB examined one right axillary sentinel node showing benign-appearing melanocytes staining Melan-A and SOX-10 with benign lymph node favored (0/1). Adequately surgically treated.     Lymph Nodes  Lymph Nodes-Clinical Exam:   Regional - Negative  Distant - Negative

## 2025-07-03 ENCOUNTER — OFFICE VISIT (OUTPATIENT)
Dept: NEUROLOGY | Facility: HOSPITAL | Age: 60
End: 2025-07-03
Payer: MEDICARE

## 2025-07-03 VITALS
TEMPERATURE: 96.6 F | HEIGHT: 65 IN | RESPIRATION RATE: 18 BRPM | SYSTOLIC BLOOD PRESSURE: 128 MMHG | HEART RATE: 71 BPM | BODY MASS INDEX: 34.66 KG/M2 | DIASTOLIC BLOOD PRESSURE: 72 MMHG | WEIGHT: 208 LBS

## 2025-07-03 DIAGNOSIS — R47.01 APHASIA: ICD-10-CM

## 2025-07-03 DIAGNOSIS — G40.109 TEMPORAL LOBE EPILEPSY (MULTI): Primary | ICD-10-CM

## 2025-07-03 DIAGNOSIS — R56.9 SEIZURE (MULTI): ICD-10-CM

## 2025-07-03 PROCEDURE — 1036F TOBACCO NON-USER: CPT | Performed by: NURSE PRACTITIONER

## 2025-07-03 PROCEDURE — 99214 OFFICE O/P EST MOD 30 MIN: CPT | Performed by: NURSE PRACTITIONER

## 2025-07-03 PROCEDURE — 3008F BODY MASS INDEX DOCD: CPT | Performed by: NURSE PRACTITIONER

## 2025-07-03 RX ORDER — LEVETIRACETAM 500 MG/1
500 TABLET ORAL 2 TIMES DAILY
Qty: 180 TABLET | Refills: 3 | Status: SHIPPED | OUTPATIENT
Start: 2025-07-03 | End: 2026-07-03

## 2025-07-03 ASSESSMENT — PAIN SCALES - GENERAL: PAINLEVEL_OUTOF10: 0-NO PAIN

## 2025-07-03 NOTE — PROGRESS NOTES
Elana Cox is a 60 y.o. 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 Seizures prior to her surgery were daily dialeptic seizures with automatisms. Since surgery she has had progressive cognitive decline mainly affecting her language function. Mild postop aphasia can occur after dominant temporal lobectomy however is usually transient and improves.   She was first seen in our office in 05/2024 for an initial consultation mainly for progressive language function decline since her epilepsy surgery.Her language function has remained the same since then. She has been doing speech therapy as well but with very modest improvement.   She was referred her to the [facility] to rule out electrographic seizures as a cause for her progressive language decline. EEG showed left temporal slowing without seizures and she did not have any clinical episodes.      5/30/25  Son states for ~2 weeks her mental status has been declining. she is more confused, she is having events of repeated movements or tasks, for example when asking to put her shoes one she will  her foot over and over without ever putting her shoe on. She is not able to feed herself and previously she was able to and she is having episodes of incontinent. Her states her urine has a very strong smell and dark appearance. She is staring off and he will have to yell to get her attention. Recommend to go to ED, which they did she was postive for UTI treated with ceftriaxone and macrobid.      Interval History:  Elana presents with her  today. He thinks since the UTI in May she has still been having seizures. He sees her tapping her feet and staring off. He will say her name and she will not respond and stare off. Her  is visibly upset today, as they are having financial issues with Elana's disability and her guardianship. He states he is being told by the court that he can not spend [employer] money on bills and threatening to take away guardianship. She was weaned off LEV in March due to emotional lability -  states he has noticed no change in mood since stopping LEV.       Current Outpatient Medications:     atorvastatin (Lipitor) 20 mg tablet, TAKE 1 TABLET BY MOUTH EVERY DAY, Disp: 90 tablet, Rfl: 0    clobetasol (Temovate) 0.05 % ointment, Apply topically 2 times a day. (Patient taking differently: Apply topically 2 times a day as needed.), Disp: 30 g, Rfl: 1    lamoTRIgine (LaMICtal) 200 mg tablet, Take 1 tablet (200 mg) by mouth 2 times a day., Disp: 180 tablet, Rfl: 3    levETIRAcetam (Keppra) 500 mg tablet, Take 1 tablet (500 mg) by mouth 2 times a day. (Patient not taking: Reported on 4/8/2025), Disp: 180 tablet, Rfl: 3    levothyroxine (Synthroid, Levoxyl) 137 mcg tablet, Take 1 tablet (137 mcg) by mouth once daily in the morning., Disp: , Rfl:     pantoprazole (ProtoNix) 40 mg EC tablet, Take 1 tablet (40 mg) by mouth once daily., Disp: 90 tablet, Rfl: 1    sertraline (Zoloft) 100 mg tablet, Take 1.5 tablets (150 mg) by mouth once daily. (Patient taking differently: Take 1.5 tablets (150 mg) by mouth once daily. Taken as 1/2 (half) tablet in AM, and 1 tablet in evening.), Disp: 45 tablet, Rfl: 2    valACYclovir (Valtrex) 1 gram tablet, TAKE 1 TABLET (1,000 MG) BY MOUTH ONCE DAILY. (Patient not taking: Reported on 4/14/2025), Disp: 90 tablet, Rfl: 1  Allergies   Allergen Reactions    Penicillins Anaphylaxis     Other reaction(s): Anaphylactic Shock     "Sulfamethoxazole-Trimethoprim Unknown       Review of Systems  As per HPI, otherwise all other systems have been reviewed are negative for complaint.      Objective   /72   Pulse 71   Temp 35.9 °C (96.6 °F)   Resp 18   Ht 1.651 m (5' 5\")   Wt 94.3 kg (208 lb)   BMI 34.61 kg/m²     Neurological Exam  Physical Exam  Significant expressive>receptive aphasia, son provides history today     Assessment/Plan   Elana Cox is a 59 y.o. 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       4D CLASSIFICATION  Type: EPE  Semiology: Aura -> Automotor (D)       Lateralizing signs: none      Onset: 2020      Frequency:  multiple per day prior to surgery, last in Dec 2023  EZ: Left temporal  Etiology: Hippocampal sclerosis, FCD Type 1  Comorbidities: hypothyroidism  Current AEDs: -200     is concerned for recurrence of her staring seizures. Plan will be to resume -500 as she was previously well controlled on this combo with lamotrigine. If seizures continue we will plan for EMU to characterize the staring events he is witnessing. If LEV is effective will continue . Placed referral for community health worker to discuss legal assistance with guardianship     Resume -500   EMU if seizures do not improve with re-starting LEV   RTC 3 months          "

## 2025-07-03 NOTE — PATIENT INSTRUCTIONS
"The [facility]  <[email or web address]  phone number: [phone].     Thank you for coming to the [facility]psy Clinic today.  -If you have any sudden new, concerning or worsening symptoms, call 911 and go to the Emergency Room. Otherwise, it was good seeing you today-    -Please follow seizure precautions:   Please do not drive, operate any heavy machinery, swim unsupervised, please shower without collection of water instead of bathe. Be cautious around hot, heavy, or sharp objects. Do not cook with an open flame and do not perform any activities at heights such as on a ladder. These precautions should stay in place until 6 months seizure free and cleared by a provider.    -HOW TO CONTACT CHANTELLE COLEY EPILEPSY NURSE PRACTITIONER ([phone]).   Instructed to call in the event of seizure, medication refills, or any questions  *Please allow 24-48 hours for non-urgent responses*.  For emergency concerns, please dial 911 or present to the nearest emergency room.  For concerns after business hours (8am-4:30pm) or on weekends please call [phone]  To call and schedule a follow up appointment please call [phone]  -Paperwork may take up to 3 business days to complete-    Every attempt is made to run on time for your appointment, if you are 15 minutes or later for your appoinement you may be asked to reschedule    -Compliance education: It is important to continue to try and achieve seizure control because of the potential for injury and illness due to seizures. In a very small minority of patients with generalized tonic clonic seizures (\"grand mal\"), breathing or heart function can stop during a seizure and result in demise (sudden unexpected death in epilepsy or SUDEP). [place] from seizures prevents this kind of outcome-     Please start taking the medication levetiracetam (Keppra) 500mg twice a day. People can experience drowsiness when first starting, but those symptoms " should go away in a week or two. At higher doses than you'll be on, some people experience irritableness. Not something that I'd expect on your dose, but let me know if this is something you experience.

## 2025-07-09 ENCOUNTER — DOCUMENTATION (OUTPATIENT)
Dept: CARE COORDINATION | Facility: CLINIC | Age: 60
End: 2025-07-09
Payer: MEDICARE

## 2025-07-09 NOTE — PROGRESS NOTES
7/9/2025     I attempted to contact the patient to provide information about organizations related to the services she requested. However, she did not answer my call. I was able to speak with her spouse, who informed me that they have already received the legal services they needed.      Signed  [facility]AD MCCLURE [facility]   HIV/HCV FOCUS Program  Certified Community Health Worker - Research  Please contact me via email or MyChart with questions

## 2025-07-09 NOTE — PROGRESS NOTES
Community Resource Name: Legal assistance  Phone Number: [phone]  Staff Member:  Sarmad Mcclure    Discussed the following topics on behalf of the patient:  []  Behavioral Health Assistance     []  Case Management  []   Assistance  []  Digital Equity Assistance  []  Dental Health Assistance  []  Education Assistance  []  Employment Assistance  []  Financial Strain Relief Assistance  []  Food Insecurity Assistance  []  Healthcare Coverage Assistance  []  Housing Stability Assistance  []  IP Violence Relief Assistance  [x]  Legal Assistance  []  Physical Activity Assistance  []  Social Connection Assistance  []  Stress Relief Assistance   []  Substance Abuse Assistance  []  Transportation Assistance  []  Utility Assistance  []  Other: [insert comment here]    Next Steps:     I attempted to contact the patient to provide information about organizations related to the services she requested. However, she did not answer my call. I was able to speak with her spouse, who informed me that they have already received the legal services they needed.    [facility]AD MCCLURE [facility]
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
## 2025-07-25 ENCOUNTER — APPOINTMENT (OUTPATIENT)
Dept: BEHAVIORAL HEALTH | Facility: CLINIC | Age: 60
End: 2025-07-25
Payer: MEDICARE

## 2025-07-25 VITALS
WEIGHT: 204.8 LBS | RESPIRATION RATE: 16 BRPM | BODY MASS INDEX: 34.12 KG/M2 | DIASTOLIC BLOOD PRESSURE: 74 MMHG | HEART RATE: 62 BPM | SYSTOLIC BLOOD PRESSURE: 128 MMHG | HEIGHT: 65 IN | TEMPERATURE: 97.6 F

## 2025-07-25 DIAGNOSIS — F33.1 MODERATE EPISODE OF RECURRENT MAJOR DEPRESSIVE DISORDER: ICD-10-CM

## 2025-07-25 DIAGNOSIS — F06.1 CATATONIA: Primary | ICD-10-CM

## 2025-07-25 PROCEDURE — 99215 OFFICE O/P EST HI 40 MIN: CPT | Performed by: STUDENT IN AN ORGANIZED HEALTH CARE EDUCATION/TRAINING PROGRAM

## 2025-07-25 PROCEDURE — 3008F BODY MASS INDEX DOCD: CPT | Performed by: STUDENT IN AN ORGANIZED HEALTH CARE EDUCATION/TRAINING PROGRAM

## 2025-07-25 RX ORDER — LORAZEPAM 1 MG/1
2 TABLET ORAL 2 TIMES DAILY
Qty: 120 TABLET | Refills: 0 | Status: SHIPPED | OUTPATIENT
Start: 2025-07-25 | End: 2025-08-24

## 2025-07-25 ASSESSMENT — COLUMBIA-SUICIDE SEVERITY RATING SCALE - C-SSRS
1. HAVE YOU WISHED YOU WERE DEAD OR WISHED YOU COULD GO TO SLEEP AND NOT WAKE UP?: NO
6. HAVE YOU EVER DONE ANYTHING, STARTED TO DO ANYTHING, OR PREPARED TO DO ANYTHING TO END YOUR LIFE?: NO
TOTAL  NUMBER OF ABORTED OR SELF INTERRUPTED ATTEMPTS LIFETIME: NO
2. HAVE YOU ACTUALLY HAD ANY THOUGHTS OF KILLING YOURSELF?: NO
ATTEMPT LIFETIME: NO
TOTAL  NUMBER OF INTERRUPTED ATTEMPTS LIFETIME: NO

## 2025-07-25 NOTE — ASSESSMENT & PLAN NOTE
- Initiate lorazepam titration:     - 1 mg PO daily (Day 1)     - 1 mg PO BID (Day 2)     - 2 mg PO QAM and 1 mg PO at bedtime (Day 3)     - 2 mg PO BID (Day 4 and beyond)     - Step back one step if too sedated     - Will repeat BFCRS at next visit, rely on collateral from Tod

## 2025-07-25 NOTE — ASSESSMENT & PLAN NOTE
- Continue sertraline 150 mg PO QHS   Received request via: Patient    Was the patient seen in the last year in this department? Yes    Does the patient have an active prescription (recently filled or refills available) for medication(s) requested? No    Does the patient have shelter Plus and need 100 day supply (blood pressure, diabetes and cholesterol meds only)? Indications of Use: Edema

## 2025-07-28 ENCOUNTER — HOSPITAL ENCOUNTER (EMERGENCY)
Facility: HOSPITAL | Age: 60
Discharge: HOME | End: 2025-07-28
Attending: EMERGENCY MEDICINE
Payer: MEDICARE

## 2025-07-28 ENCOUNTER — CLINICAL SUPPORT (OUTPATIENT)
Dept: EMERGENCY MEDICINE | Facility: HOSPITAL | Age: 60
End: 2025-07-28
Payer: MEDICARE

## 2025-07-28 ENCOUNTER — DOCUMENTATION (OUTPATIENT)
Dept: BEHAVIORAL HEALTH | Facility: CLINIC | Age: 60
End: 2025-07-28
Payer: MEDICARE

## 2025-07-28 VITALS
DIASTOLIC BLOOD PRESSURE: 85 MMHG | HEART RATE: 50 BPM | HEIGHT: 66 IN | SYSTOLIC BLOOD PRESSURE: 155 MMHG | TEMPERATURE: 97.5 F | WEIGHT: 205 LBS | RESPIRATION RATE: 16 BRPM | BODY MASS INDEX: 32.95 KG/M2 | OXYGEN SATURATION: 97 %

## 2025-07-28 DIAGNOSIS — G40.909 SEIZURE DISORDER (MULTI): Primary | ICD-10-CM

## 2025-07-28 LAB
ALBUMIN SERPL BCP-MCNC: 4.6 G/DL (ref 3.4–5)
ALP SERPL-CCNC: 89 U/L (ref 33–136)
ALT SERPL W P-5'-P-CCNC: 11 U/L (ref 7–45)
AMPHETAMINES UR QL SCN: NORMAL
ANION GAP SERPL CALC-SCNC: 16 MMOL/L (ref 10–20)
APAP SERPL-MCNC: <10 UG/ML (ref ?–30)
APPEARANCE UR: CLEAR
AST SERPL W P-5'-P-CCNC: 11 U/L (ref 9–39)
ATRIAL RATE: 58 BPM
BARBITURATES UR QL SCN: NORMAL
BASOPHILS # BLD AUTO: 0.07 X10*3/UL (ref 0–0.1)
BASOPHILS NFR BLD AUTO: 1 %
BENZODIAZ UR QL SCN: NORMAL
BILIRUB SERPL-MCNC: 0.5 MG/DL (ref 0–1.2)
BILIRUB UR STRIP.AUTO-MCNC: NEGATIVE MG/DL
BUN SERPL-MCNC: 18 MG/DL (ref 6–23)
BZE UR QL SCN: NORMAL
CALCIUM SERPL-MCNC: 9.8 MG/DL (ref 8.6–10.6)
CANNABINOIDS UR QL SCN: NORMAL
CHLORIDE SERPL-SCNC: 103 MMOL/L (ref 98–107)
CO2 SERPL-SCNC: 24 MMOL/L (ref 21–32)
COLOR UR: NORMAL
CREAT SERPL-MCNC: 1.04 MG/DL (ref 0.5–1.05)
EGFRCR SERPLBLD CKD-EPI 2021: 61 ML/MIN/1.73M*2
EOSINOPHIL # BLD AUTO: 0.11 X10*3/UL (ref 0–0.7)
EOSINOPHIL NFR BLD AUTO: 1.6 %
ERYTHROCYTE [DISTWIDTH] IN BLOOD BY AUTOMATED COUNT: 13.5 % (ref 11.5–14.5)
ETHANOL SERPL-MCNC: <10 MG/DL
FENTANYL+NORFENTANYL UR QL SCN: NORMAL
GLUCOSE SERPL-MCNC: 98 MG/DL (ref 74–99)
GLUCOSE UR STRIP.AUTO-MCNC: NORMAL MG/DL
HCT VFR BLD AUTO: 41.1 % (ref 36–46)
HGB BLD-MCNC: 13.6 G/DL (ref 12–16)
IMM GRANULOCYTES # BLD AUTO: 0.02 X10*3/UL (ref 0–0.7)
IMM GRANULOCYTES NFR BLD AUTO: 0.3 % (ref 0–0.9)
KETONES UR STRIP.AUTO-MCNC: NEGATIVE MG/DL
LEUKOCYTE ESTERASE UR QL STRIP.AUTO: NEGATIVE
LEVETIRACETAM SERPL-MCNC: 16 UG/ML (ref 10–40)
LYMPHOCYTES # BLD AUTO: 1.6 X10*3/UL (ref 1.2–4.8)
LYMPHOCYTES NFR BLD AUTO: 23.6 %
MCH RBC QN AUTO: 27 PG (ref 26–34)
MCHC RBC AUTO-ENTMCNC: 33.1 G/DL (ref 32–36)
MCV RBC AUTO: 82 FL (ref 80–100)
METHADONE UR QL SCN: NORMAL
MONOCYTES # BLD AUTO: 0.34 X10*3/UL (ref 0.1–1)
MONOCYTES NFR BLD AUTO: 5 %
NEUTROPHILS # BLD AUTO: 4.63 X10*3/UL (ref 1.2–7.7)
NEUTROPHILS NFR BLD AUTO: 68.5 %
NITRITE UR QL STRIP.AUTO: NEGATIVE
NRBC BLD-RTO: 0 /100 WBCS (ref 0–0)
OPIATES UR QL SCN: NORMAL
OXYCODONE+OXYMORPHONE UR QL SCN: NORMAL
P AXIS: 59 DEGREES
P OFFSET: 175 MS
P ONSET: 122 MS
PCP UR QL SCN: NORMAL
PH UR STRIP.AUTO: 7.5 [PH]
PLATELET # BLD AUTO: 309 X10*3/UL (ref 150–450)
POTASSIUM SERPL-SCNC: 4.9 MMOL/L (ref 3.5–5.3)
PR INTERVAL: 198 MS
PROT SERPL-MCNC: 7.3 G/DL (ref 6.4–8.2)
PROT UR STRIP.AUTO-MCNC: NEGATIVE MG/DL
Q ONSET: 221 MS
QRS COUNT: 10 BEATS
QRS DURATION: 96 MS
QT INTERVAL: 432 MS
QTC CALCULATION(BAZETT): 424 MS
QTC FREDERICIA: 426 MS
R AXIS: 8 DEGREES
RBC # BLD AUTO: 5.04 X10*6/UL (ref 4–5.2)
RBC # UR STRIP.AUTO: NEGATIVE MG/DL
SALICYLATES SERPL-MCNC: <3 MG/DL (ref ?–20)
SODIUM SERPL-SCNC: 138 MMOL/L (ref 136–145)
SP GR UR STRIP.AUTO: 1.02
T AXIS: 78 DEGREES
T OFFSET: 437 MS
UROBILINOGEN UR STRIP.AUTO-MCNC: NORMAL MG/DL
VENTRICULAR RATE: 58 BPM
WBC # BLD AUTO: 6.8 X10*3/UL (ref 4.4–11.3)

## 2025-07-28 PROCEDURE — 80053 COMPREHEN METABOLIC PANEL: CPT

## 2025-07-28 PROCEDURE — 36415 COLL VENOUS BLD VENIPUNCTURE: CPT

## 2025-07-28 PROCEDURE — 80177 DRUG SCRN QUAN LEVETIRACETAM: CPT

## 2025-07-28 PROCEDURE — 80179 DRUG ASSAY SALICYLATE: CPT

## 2025-07-28 PROCEDURE — 99223 1ST HOSP IP/OBS HIGH 75: CPT

## 2025-07-28 PROCEDURE — 80307 DRUG TEST PRSMV CHEM ANLYZR: CPT

## 2025-07-28 PROCEDURE — 85025 COMPLETE CBC W/AUTO DIFF WBC: CPT

## 2025-07-28 PROCEDURE — 81003 URINALYSIS AUTO W/O SCOPE: CPT

## 2025-07-28 PROCEDURE — 99284 EMERGENCY DEPT VISIT MOD MDM: CPT | Performed by: EMERGENCY MEDICINE

## 2025-07-28 PROCEDURE — 93005 ELECTROCARDIOGRAM TRACING: CPT

## 2025-07-28 ASSESSMENT — PAIN - FUNCTIONAL ASSESSMENT: PAIN_FUNCTIONAL_ASSESSMENT: 0-10

## 2025-07-28 ASSESSMENT — PAIN SCALES - GENERAL: PAINLEVEL_OUTOF10: 0 - NO PAIN

## 2025-07-28 NOTE — PROGRESS NOTES
I received Elana Cox in signout from outgoing provider.  Please see the previous note for all HPI, PE and MDM up to the time of signout at 1500.    In brief Elana Cox is an 60 y.o. female presenting for reported altered mental status and possible seizure-like activity  Chief Complaint   Patient presents with    Altered Mental Status   .  At the time of signout we were awaiting:  Recommendations from Neurology team.  I examined the patient at time of assumption of care:  Constitutional: Resting comfortably and in no acute distress  HEENT: EOMs are intact, no scleral icterus. Normal phonation  Respiratory: No respiratory distress  Perfusion: Skin appears well-perfused  Neuro: Cranial nerves II through XII grossly intact, moving all 4 extremities with no obvious deficit, seen to ambulate normally      During my care, the patient was reassessed by neurology team, who report that the patient has had a history of staring spells for awhile. Neurology team found the patient to be at her baseline and they felt that the patient can be discharged home with outpatient follow-up. Neurology instructed the patient to follow-up with her regular scheduled appointment with her outpatient Neurologist including an EMU stay in October. Neurology recommended that the patient continue taking her Keppra and lamotrigine. Plan was discussed with patient and  who both felt comfortable with the patient being discharged home. The patient was instructed of supportive measures and to follow-up with Neurology and her primary care physician. Strict precautions were provided, for which the patient and  expressed understanding. The patient was discharged home in stable condition. They should feel free to return to the Emergency Department at any time should their condition worsen or should they have any questions or concerns.       Patient seen and discussed with attending of record.    Ryan De La Cruz MD

## 2025-07-28 NOTE — DISCHARGE INSTRUCTIONS
You have been evaluated in the Emergency Department today for seizure like. Your evaluation, including evaluation by neurology, suggests that your symptoms are due to your baseline seizure disorder.    Please follow up with your primary care physician within two days. If you do not have a primary care doctor you may call [phone] to make an appointment.    Return to the Emergency Department if you experience seizurelike activity. Return to the Emergency Department if you develop any new or worsening symptoms.   Thank you for choosing us for your care.

## 2025-07-28 NOTE — Clinical Note
Family of MERVIN Cox accompanied Elana Cox to the emergency department on 7/28/2025. They may return to work on 07/30/2025.      If you have any questions or concerns, please don't hesitate to call.      Ryan De La Cruz MD

## 2025-07-28 NOTE — CONSULTS
EPAT Initial Psychiatry Consult    ASSESSMENT  On initial assessment, patient is minimally unresponsive verbally and displays mild cogwheel rigidity on exam. Given patient's symptoms worsened after starting Ativan, suspicion for catatonia is low. Patient's presentation is less likely psychiatric and more likely neurologic iso re-starting Keppra approximately 2 months ago. Will recommend Neurology consult for further workup.     IMPRESSION  Seizures      RECOMMENDATIONS  Safety:  - Patient does not currently meet criteria for inpatient psychiatric admission.   - Patient does not require a 1:1 sitter from a psychiatric perspective at this time.       Medications:  -N/A    Work-up:  -N/A    Follow-Up:  -Consult Neurology for possible seizures iso altered mental status     Recommendations discussed with Neurology resident, Dr. Dylan Dunn.   Patient seen and discussed with Dr. Roth, who agrees with above plan.    Aspen Aaron MD    ---------------------------------------------------------------------------------------------------------------  HISTORY OF PRESENT ILLNESS  Elana Cox is a 60 y.o. female with a past psychiatric history of Major Depressive Disorder and a past medical history of L temporal epilepsy s/p temporal lobectomy, cognitive impairment, hypothyroidism, HLD, migraines, and GERD who presented to the ED via son who is primary caregiver for altered mental status after starting ativan titration secondary to possible catatonia. Patient was medically cleared and EPAT was consulted to complete an evaluation.    On chart review, patient was recently seen for psychiatric follow up due to concerns of catatonia. Per patient's family, patient was restarted on Keppra for possible seizure-like activity approximately 2 months ago. Since then, family has noticed worsening change in behaviors (crying, staring for periods of time, odd facial expressions; BF=14). Patient was started on Ativan titration for  "catatonia 3 days ago. Since starting Ativan, family reports increased episodes of crying, not responding when spoken to, episodes of bowel incontinence, and not eating. Patient's daughter called emergency psychiatry line this morning for \"strange behvior\" and was advised to hold Ativan and bring patient into ED. In the ED, patient vitally stable, CBC and BMP unremarkable, therapeutic Keppra 16, EtOH <10.     On interview, patient follows some commands and responds \"yes\" or \"no\" to questions, but does not respond in full sentences.     Psychiatric History  Prior diagnoses: MDD (on Sertraline 150mg daily)  Prior hospitalizations: None  History of suicide attempts: None  History of self-harm: None  History of violence: None    Current psychiatrist: Lilly Laughlin MD    Current psychiatric medications: Sertraline 150mg daily  Past psychiatric medications: None  Past psychiatric treatments: None      Social History  Current living situation: Lives at home with        Past Medical History  Medical History[1]     Past Surgical History  Surgical History[2]     Family History  Family History[3]     Allergies  Penicillins and Sulfamethoxazole-trimethoprim    PDMP Review  Reviewed: Yes  Comments: No irregularities noted.     OBJECTIVE  Vitals  Blood pressure 135/64, pulse 58, temperature 36.2 °C (97.2 °F), resp. rate 18, height 1.676 m (5' 6\"), weight 93 kg (205 lb), SpO2 98%.    Mental Status Exam  General/Appearance: no distress, appears stated age, well kept  Attitude/Behavior: Superficially cooperative. and Fair eye contact.  Motor Activity: cogwheel rigidity, gait: not tested  Mood: unable to assess   Affect: Restricted range  Speech: unable to assess  Thought Process: unable to assess  Thought Content: unable to assess, delusions:unable to assess   Thought Perception: unable to assess  Cognition: impaired due to history of cognitive impairment s/p temporal lobectomy  Insight: limited  Judgment: limited    Home " Medications  Medication Documentation Review Audit       Reviewed by Carmen Suarez RN (Registered Nurse) on 25 at 0841      Medication Order Taking? Sig Documenting Provider Last Dose Status   atorvastatin (Lipitor) 20 mg tablet [id number]  TAKE 1 TABLET BY MOUTH EVERY DAY Clemmaya James, DO  Active   clobetasol (Temovate) 0.05 % ointment [id number] No Apply topically 2 times a day.   Patient taking differently: Apply topically 2 times a day as needed.    Keith Wright MD Unknown Active   lamoTRIgine (LaMICtal) 200 mg tablet [id number] Yes Take 1 tablet (200 mg) by mouth 2 times a day. Donal Wiggins MD 2025 Morning Active   levETIRAcetam (Keppra) 500 mg tablet [id number]  Take 1 tablet (500 mg) by mouth 2 times a day. Michaela Swan, APRN-CNP  Active   levothyroxine (Synthroid, Levoxyl) 137 mcg tablet [id number] No Take 1 tablet (137 mcg) by mouth once daily in the morning. Lonnie Elizabeth MD 2025 Morning Active   pantoprazole (ProtoNix) 40 mg EC tablet [id number] No Take 1 tablet (40 mg) by mouth once daily. Keith Wright MD 2025 Morning Active   sertraline (Zoloft) 100 mg tablet [id number] Yes Take 1.5 tablets (150 mg) by mouth once daily. Lilly Laughlin MD 2025 Morning Active   valACYclovir (Valtrex) 1 gram tablet [id number] No TAKE 1 TABLET (1,000 MG) BY MOUTH ONCE DAILY.   Patient not taking: Reported on 2025    Keith Wright MD Unknown  25 [id number]                     Current Medications  Scheduled Medications[4]  Continuous Medications[5]  PRN Medications[6]     Labs  Results for orders placed or performed during the hospital encounter of 25 (from the past 24 hours)   CBC and Auto Differential   Result Value Ref Range    WBC 6.8 4.4 - 11.3 x10*3/uL    nRBC 0.0 0.0 - 0.0 /100 WBCs    RBC 5.04 4.00 - 5.20 x10*6/uL    Hemoglobin 13.6 12.0 - 16.0 g/dL    Hematocrit 41.1 36.0 - 46.0 %    MCV 82 80 - 100 fL    MCH 27.0 26.0 - 34.0 pg    MCHC 33.1 32.0 -  36.0 g/dL    RDW 13.5 11.5 - 14.5 %    Platelets 309 150 - 450 x10*3/uL    Neutrophils % 68.5 40.0 - 80.0 %    Immature Granulocytes %, Automated 0.3 0.0 - 0.9 %    Lymphocytes % 23.6 13.0 - 44.0 %    Monocytes % 5.0 2.0 - 10.0 %    Eosinophils % 1.6 0.0 - 6.0 %    Basophils % 1.0 0.0 - 2.0 %    Neutrophils Absolute 4.63 1.20 - 7.70 x10*3/uL    Immature Granulocytes Absolute, Automated 0.02 0.00 - 0.70 x10*3/uL    Lymphocytes Absolute 1.60 1.20 - 4.80 x10*3/uL    Monocytes Absolute 0.34 0.10 - 1.00 x10*3/uL    Eosinophils Absolute 0.11 0.00 - 0.70 x10*3/uL    Basophils Absolute 0.07 0.00 - 0.10 x10*3/uL   Comprehensive metabolic panel   Result Value Ref Range    Glucose 98 74 - 99 mg/dL    Sodium 138 136 - 145 mmol/L    Potassium 4.9 3.5 - 5.3 mmol/L    Chloride 103 98 - 107 mmol/L    Bicarbonate 24 21 - 32 mmol/L    Anion Gap 16 10 - 20 mmol/L    Urea Nitrogen 18 6 - 23 mg/dL    Creatinine 1.04 0.50 - 1.05 mg/dL    eGFR 61 >60 mL/min/1.73m*2    Calcium 9.8 8.6 - 10.6 mg/dL    Albumin 4.6 3.4 - 5.0 g/dL    Alkaline Phosphatase 89 33 - 136 U/L    Total Protein 7.3 6.4 - 8.2 g/dL    AST 11 9 - 39 U/L    Bilirubin, Total 0.5 0.0 - 1.2 mg/dL    ALT 11 7 - 45 U/L   Levetiracetam   Result Value Ref Range    Levetiracetam (Keppra) 16 10 - 40 ug/mL   Acute Toxicology Panel, Blood   Result Value Ref Range    Acetaminophen <10.0 10.0 - 30.0 ug/mL    Salicylate  <3 4 - 20 mg/dL    Alcohol <10 <=10 mg/dL        Imaging  No CT head results found for the past 14 days   [unfilled]     Encounter Date: 04/14/25   Electrocardiogram, 12-lead   Result Value    Ventricular Rate 57    Atrial Rate 57    AL Interval 208    QRS Duration 98    QT Interval 452    QTC Calculation(Bazett) 439    P Axis 43    R Axis -23    T Axis 55    QRS Count 9    Q Onset 212    P Onset 108    P Offset 164    T Offset 438    QTC Fredericia 444    Narrative    Sinus bradycardia  Otherwise normal ECG  When compared with ECG of 06-JUN-2024 09:47,  No  significant change was found  Confirmed by Tod Rick ([id number]) on 4/16/2025 1:13:57 PM     Medication Consent  Medication Consent: n/a; consult service          [1]   Past Medical History:  Diagnosis Date    Cognitive impairment     Delayed emergence from general anesthesia     GERD (gastroesophageal reflux disease)     Hyperlipidemia     Hypothyroidism     Major depressive disorder     Melanoma of right upper arm (Multi)     Memory impairment     Migraines     Seizures (Multi)     EPILEPSY   [2]   Past Surgical History:  Procedure Laterality Date    BRAIN SURGERY      COLONOSCOPY      HERNIA REPAIR      HYSTERECTOMY      SKIN BIOPSY     [3]   Family History  Problem Relation Name Age of Onset    Diabetes Mother      Heart failure Father      Diabetes Father      Skin cancer Father     [4] [5] [6]

## 2025-07-28 NOTE — ED TRIAGE NOTES
"Newly prescribed to Ativan for seizures on Friday on having confusion and \"constant sleeping\" x2 days. Had episode of incontinence today. A&Ox1 at baseline. Family states at baseline she does not speak. States she is more confused than normal   "

## 2025-07-28 NOTE — ED PROVIDER NOTES
EMERGENCY DEPARTMENT ENCOUNTER      Pt Name: Elana Cox  MRN: [id number]  Birthdate 1965  Date of evaluation: 7/28/2025  Provider: Porter Kirby DO    CHIEF COMPLAINT       Chief Complaint   Patient presents with    Altered Mental Status         HISTORY OF PRESENT ILLNESS    60-year-old female presenting today with a chief complaint of altered mental status.  Has a history of left temporal epilepsy status post anterior temporal lobectomy, progressive cognitive decline after that surgery.  Recently evaluated by psychiatry, was having episodes of catatonia, was started on oral lorazepam titration.  Patient is brought in today by her son who is her primary caregiver, states that the patient has been more altered over the past few days.  Patient had 1 episode of incontinence today, had a bowel movement on himself.  No injuries to the back of trauma.  Patient [facility], ER for evaluation by psychiatry team.          Nursing Notes were reviewed.    PAST MEDICAL HISTORY   Medical History[1]      SURGICAL HISTORY     Surgical History[2]      CURRENT MEDICATIONS       Discharge Medication List as of 7/28/2025  4:08 PM        CONTINUE these medications which have NOT CHANGED    Details   atorvastatin (Lipitor) 20 mg tablet TAKE 1 TABLET BY MOUTH EVERY DAY, Starting Tue 6/17/2025, Normal      clobetasol (Temovate) 0.05 % ointment Apply topically 2 times a day., Starting Thu 2/27/2025, Until Sat 10/25/2025, Normal      lamoTRIgine (LaMICtal) 200 mg tablet Take 1 tablet (200 mg) by mouth 2 times a day., Starting Wed 9/25/2024, Until Thu 9/25/2025, Normal      levETIRAcetam (Keppra) 500 mg tablet Take 1 tablet (500 mg) by mouth 2 times a day., Starting Thu 7/3/2025, Until Fri 7/3/2026, Normal      levothyroxine (Synthroid, Levoxyl) 137 mcg tablet Take 1 tablet (137 mcg) by mouth once daily in the morning., Historical Med      LORazepam (Ativan) 1 mg tablet Take 2 tablets (2 mg) by mouth 2 times a day., Starting Fri 7/25/2025,  Until Sun 8/24/2025, Normal      pantoprazole (ProtoNix) 40 mg EC tablet Take 1 tablet (40 mg) by mouth once daily., Starting Thu 2/27/2025, Until Fri 2/27/2026, Normal      sertraline (Zoloft) 100 mg tablet Take 1.5 tablets (150 mg) by mouth once daily., Starting Fri 5/16/2025, Until Thu 8/14/2025, Normal             ALLERGIES     Penicillins and Sulfamethoxazole-trimethoprim    FAMILY HISTORY     Family History[3]       SOCIAL HISTORY     Social History[4]    SCREENINGS                        PHYSICAL EXAM    (up to 7 for level 4, 8 or more for level 5)     ED Triage Vitals [07/28/25 0838]   Temperature Heart Rate Respirations BP   36.2 °C (97.2 °F) 63 16 148/82      Pulse Ox Temp src Heart Rate Source Patient Position   96 % -- -- --      BP Location FiO2 (%)     -- --       Physical Exam     DIAGNOSTIC RESULTS     LABS:  Labs Reviewed   COMPREHENSIVE METABOLIC PANEL - Normal       Result Value    Glucose 98      Sodium 138      Potassium 4.9      Chloride 103      Bicarbonate 24      Anion Gap 16      Urea Nitrogen 18      Creatinine 1.04      eGFR 61      Calcium 9.8      Albumin 4.6      Alkaline Phosphatase 89      Total Protein 7.3      AST 11      Bilirubin, Total 0.5      ALT 11     LEVETIRACETAM - Normal    Levetiracetam (Keppra) 16      Narrative:     Brivaracetam may falsely increase the amount of Levetiracetam measured by this method. Serum levels should be confirmed by a valid chromatographic method  for patients with these drugs co-present in circulation.   URINALYSIS WITH REFLEX CULTURE AND MICROSCOPIC - Normal    Color, Urine Light-Yellow      Appearance, Urine Clear      Specific Gravity, Urine 1.018      pH, Urine 7.5      Protein, Urine NEGATIVE      Glucose, Urine Normal      Blood, Urine NEGATIVE      Ketones, Urine NEGATIVE      Bilirubin, Urine NEGATIVE      Urobilinogen, Urine Normal      Nitrite, Urine NEGATIVE      Leukocyte Esterase, Urine NEGATIVE     DRUG SCREEN,URINE - Normal     Amphetamine Screen, Urine Presumptive Negative      Barbiturate Screen, Urine Presumptive Negative      Benzodiazepines Screen, Urine Presumptive Negative      Cannabinoid Screen, Urine Presumptive Negative      Cocaine Metabolite Screen, Urine Presumptive Negative      Fentanyl Screen, Urine Presumptive Negative      Opiate Screen, Urine Presumptive Negative      Oxycodone Screen, Urine Presumptive Negative      PCP Screen, Urine Presumptive Negative      Methadone Screen, Urine Presumptive Negative      Narrative:     Drug screen results are presumptive and should not be used to assess   compliance with prescribed medication. Contact the performing [facility] laboratory   to add-on definitive confirmatory testing if clinically indicated.    Toxicology screening results are reported qualitatively. The concentration must   be greater than or equal to the cutoff to be reported as positive. The concentration   at which the screening test can detect an individual drug or metabolite varies.   The absence of expected drug(s) and/or drug metabolite(s) may indicate non-compliance,   inappropriate timing of specimen collection relative to drug administration, poor drug   absorption, diluted/adulterated urine, or limitations of testing. For medical purposes   only; not valid for forensic use.    Interpretive questions should be directed to the laboratory medical directors.   ACUTE TOXICOLOGY PANEL, BLOOD - Normal    Acetaminophen <10.0      Salicylate  <3      Alcohol <10     CBC WITH AUTO DIFFERENTIAL    WBC 6.8      nRBC 0.0      RBC 5.04      Hemoglobin 13.6      Hematocrit 41.1      MCV 82      MCH 27.0      MCHC 33.1      RDW 13.5      Platelets 309      Neutrophils % 68.5      Immature Granulocytes %, Automated 0.3      Lymphocytes % 23.6      Monocytes % 5.0      Eosinophils % 1.6      Basophils % 1.0      Neutrophils Absolute 4.63      Immature Granulocytes Absolute, Automated 0.02      Lymphocytes Absolute 1.60       Monocytes Absolute 0.34      Eosinophils Absolute 0.11      Basophils Absolute 0.07     URINALYSIS WITH REFLEX CULTURE AND MICROSCOPIC    Narrative:     The following orders were created for panel order Urinalysis with Reflex Culture and Microscopic.  Procedure                               Abnormality         Status                     ---------                               -----------         ------                     Urinalysis with Reflex C...[[id number]]  Normal              Final result               Extra Urine Gray Tube[[id number]]                            In process                   Please view results for these tests on the individual orders.   EXTRA URINE GRAY TUBE       All other labs were within normal range or not returned as of this dictation.    Imaging  No orders to display        Procedures  Procedures     EMERGENCY DEPARTMENT COURSE/MDM:   Medical Decision Making  60-year-old female presenting today with a chief complaint of altered mental status.  Has a history of left temporal epilepsy status post anterior temporal lobectomy, progressive cognitive decline after that surgery.  Recently evaluated by psychiatry, was having episodes of catatonia, was started on oral lorazepam titration.  Patient is brought in today by her son who is her primary caregiver, states that the patient has been more altered over the past few days.  Patient had 1 episode of incontinence today, had a bowel movement on himself.  No injuries to the back of trauma.  Patient [facility], ER for evaluation by psychiatry team.    Vital signs stable on arrival.  Patient resting comfortably in bed.  No significant abnormality noted with physical exam.  Patient was with her psychiatrist, Dr. Laughlin, who recommends workup and consultation with psychiatry and neurology teams.     We will obtain usual psychiatric evaluation including CBC, BMP, urinalysis, urine drug screen, acute tox panel as well as an EKG. Keppra level obtained.  EPAT  consulted.  Neurology will be consulted as well.         Please see ED course for additional MDM.    ED Course as of 07/29/25 0733   Mon Jul 28, 2025   0946 EKG taken at 0934 on 7/28/2025 demonstrates sinus bradycardia, rate of 58, WA interval 198, QRS 96, QTc 424.  Normal axis.  No acute ischemic changes noted. [CL]   0951 Keppra level within therapeutic range [CL]   1230 Patient signed out to oncoming resident pending final neurology recommendations. [CL]      ED Course User Index  [CL] Porter Kirby DO         Diagnoses as of 07/29/25 0733   Seizure disorder (Multi)        No orders to display       Patient and or family in agreement and understanding of treatment plan.  All questions answered.      I reviewed the case with the attending ED physician. The attending ED physician agrees with the plan. Patient and/or patient´s representative was counseled regarding labs, imaging, likely diagnosis, and plan. All questions were answered.    ED Medications administered this visit:  Medications - No data to display    New Prescriptions from this visit:    Discharge Medication List as of 7/28/2025  4:08 PM          Follow-up:  No follow-up provider specified.      Final Impression:   1. Seizure disorder (Multi)          (Please note that portions of this note were completed with a voice recognition program.  Efforts were made to edit the dictations but occasionally words are mis-transcribed.)       [1]   Past Medical History:  Diagnosis Date    Cognitive impairment     Delayed emergence from general anesthesia     GERD (gastroesophageal reflux disease)     Hyperlipidemia     Hypothyroidism     Major depressive disorder     Melanoma of right upper arm (Multi)     Memory impairment     Migraines     Seizures (Multi)     EPILEPSY   [2]   Past Surgical History:  Procedure Laterality Date    BRAIN SURGERY      COLONOSCOPY      HERNIA REPAIR      HYSTERECTOMY      SKIN BIOPSY     [3]   Family History  Problem Relation Name Age  of Onset    Diabetes Mother      Heart failure Father      Diabetes Father      Skin cancer Father     [4]   Social History  Socioeconomic History    Marital status:    Tobacco Use    Smoking status: Former     Current packs/day: 0.00     Types: Cigarettes     Quit date:      Years since quittin.5     Passive exposure: Past    Smokeless tobacco: Never   Vaping Use    Vaping status: Never Used   Substance and Sexual Activity    Alcohol use: Not Currently    Drug use: Never    Sexual activity: Defer     Birth control/protection: Post-menopausal     Comment: HYSTER     Social Drivers of Health     Financial Resource Strain: Low Risk  (2024)    Overall Financial Resource Strain (CARDIA)     Difficulty of Paying Living Expenses: Not very hard   Food Insecurity: No Food Insecurity (2024)    Received from [facility]    Hunger Vital Sign     Within the past 12 months, you worried that your food would run out before you got the money to buy more.: Never true     Within the past 12 months, the food you bought just didn't last and you didn't have money to get more.: Never true   Transportation Needs: No Transportation Needs (2024)    Received from [facility]    PRAPARE - Transportation     Lack of Transportation (Medical): No     Lack of Transportation (Non-Medical): No   Housing Stability: Unknown (2024)    Received from [facility]    Housing Stability Vital Sign     In the last 12 months, was there a time when you were not able to pay the mortgage or rent on time?: No     At any time in the past 12 months, were you homeless or living in a shelter (including now)?: No        Porter Kirby DO  Resident  25 0715

## 2025-07-28 NOTE — CONSULTS
GENERAL CONSULT NOTE    Consult question: AMS, acute-on-chronic language difficulties/aimless behavior/difficulty following commands.    History of Present Illness: Elana Cox is a 60 y.o. female with a PMHx of dominant left temporal epilepsy s/p anterior temporal lobectomy (2020), melanoma s/p resection, and hypothyroidism and PPHx depression presents to the ED for concerns of AMS in setting of acute-on-chronic language difficulties/aimless behavior/difficulty following commands.    Epilepsy History  Per chart review patient established care with Dr. Wiggins and Michaela Swan in May 2024 after presenting with post operative language and cognitive decline. She had and [facility]U admission that ruled out electrographic seizures. At one year follow up she continued to have further cognitive decline and began having repetitive movements, task repetition, inability to feed herself, incontinence episodes found to be iso of urinary tract infection.  She was most recently seen 3 weeks ago and was having staring spells, and tapping of her feet.   Type: EPE  Semiology: Aura -> Automotor (D)       Lateralizing signs: none      Onset: 2020      Frequency:  multiple per day prior to surgery, last in Dec 2023  EZ: Left temporal  Etiology: Hippocampal sclerosis, FCD Type 1  Comorbidities: hypothyroidism  Current AEDs: -200    Psychiatric History  Patient recently seen by Dr. Lilly Laughlin from psychiatry with signs and symptoms concerning for catatonia, noted to be worsening  since restarting levetiracetam. Patient's spouse Tod reports that Evangelina has been [place] at the time was 13. Initiated lorazepam titration and planned to reassessed in 5 days with close follow-up:      - 1 mg PO daily (Day 1)     - 1 mg PO BID (Day 2)     - 2 mg PO QAM and 1 mg PO at bedtime (Day 3)     - 2 mg PO BID (Day 4 and beyond)    Patient/Family Interview:   On interview today,  and son at bedside reports that she had an  "operation in 2020 which \"wiped her out,\" requiring 2 days to wake her up.  Reported that she seemed fine at the time.  After a month postop, recovered perfectly and went back to work.  However after an unspecified amount of time, the patient suddenly forgot how to work and \"what blank.\"  Return to [facility] for her EEG, spent 4 days there.  Determined that she most likely had a seizure when she \"went blank,\" but they could not see anything on EEG.  Patient lost the ability to work after seizure.  Was recommended physical and speech therapy, which she followed for 1.5 years without improvement.     reports that her baseline is that she is able to independently ambulate but not to care for self.  Cannot feed herself: Will  food but not eating and just stares at it.  Will walk around the house aimlessly but once she arrives at her destination cannot carry out the action that she was hoping to do.  Multiple times has walked to the bathroom and sat down to have a bowel movement but will not take her pants off, necessitating  to take off her clothes and clean her.  Is mostly nonverbal but cannot occasionally answer and 1-2 word phrases.  Most of the time cannot follow simple commands.   and son brought her home medications, listed below:    - Keppra 500 twice daily  - Sertraline 150 mg daily  - Levothyroxine 137 mcg daily  - Atorvastatin 20 mg daily  - Pantoprazole 40 mg daily  - Lamotrigine 200 mg twice daily    Patient unable to meaningfully participating exam.  Responds quickly to her name, and seems to be able to deny symptoms of pain, discomfort, headaches.  However further examination raises concern for echolalia.  Unable to state her full name, location, time, situation.    ROS: All systems reviewed and were negative except as above    Home Medications:    Current Outpatient Medications   Medication Instructions    atorvastatin (LIPITOR) 20 mg, oral, Daily    clobetasol (Temovate) 0.05 % " ointment Topical, 2 times daily    lamoTRIgine (LAMICTAL) 200 mg, oral, 2 times daily    levETIRAcetam (KEPPRA) 500 mg, oral, 2 times daily    levothyroxine (SYNTHROID, LEVOXYL) 137 mcg, Every morning    LORazepam (ATIVAN) 2 mg, oral, 2 times daily    pantoprazole (PROTONIX) 40 mg, oral, Daily    sertraline (ZOLOFT) 150 mg, oral, Daily     Past Medical History:    has a past medical history of Cognitive impairment, Delayed emergence from general anesthesia, GERD (gastroesophageal reflux disease), Hyperlipidemia, Hypothyroidism, Major depressive disorder, Melanoma of right upper arm (Multi), Memory impairment, Migraines, and Seizures (Multi).    Past Surgical History:    has a past surgical history that includes Colonoscopy; Hernia repair; Brain surgery; Hysterectomy; and Skin biopsy.    Allergies:   RX Allergies[1]    Family History:   Family History[2]    Past Social History:   Social History[3]    Vitals:   Temperature:  [36.2 °C (97.2 °F)] 36.2 °C (97.2 °F)  Heart Rate:  [63] 63  Respirations:  [16] 16  BP: (148)/(82) 148/82    Physical Exam:   General Appearance:  No distress, alert, minimally interactive and cooperative.      Mental State: Will respond to name, states first name but not last name but unable to say full name, place, time, or situation when prompted.    Echolalia   Follows simple commands   Perseverates yes, yes, yes but does not answer multiple choice questions     Cranial Nerves:   *Difficult to assess due to inability to follow simple commands  CN 2:  No gaze preference  CN 3, 4, 6: Tracking appropriately, pupils 3mm and briskly reactive   CN 5: Untestable  CN 7: Normal and symmetric facial strength. Nasolabial folds symmetric.   CN 8: Hearing intact to voice  CN 9,10,12: No evidence of gross dysphagia     Motor:   Muscle bulk reduced   Paratonia present without clear cogwheel rigidity   Moving bilateral upper and lower extremities 5/5 proximally and distally     Reflexes: Right/ Left:  Biceps  "2/2, brachioradialis 2/2, triceps 2/2, patellar 3/3, ankle unable to test d/t cooperation.  Unable to test plantar stimulation     Sensory: Sensation to sharp stimuli intact x4    Coordination: Could not follow commands to perform finger-to-nose or hell-to-shin testing.     Gait: Not tested.     Labs:   Results from last 72 hours   Lab Units 07/28/25  0902   WBC AUTO x10*3/uL 6.8   HEMOGLOBIN g/dL 13.6   HEMATOCRIT % 41.1   PLATELETS AUTO x10*3/uL 309      Results from last 72 hours   Lab Units 07/28/25  0902   SODIUM mmol/L 138   POTASSIUM mmol/L 4.9   CHLORIDE mmol/L 103   CO2 mmol/L 24   BUN mg/dL 18   CREATININE mg/dL 1.04   GLUCOSE mg/dL 98      Results from last 72 hours   Lab Units 07/28/25  0902   ALK PHOS U/L 89   AST U/L 11   ALT U/L 11   BILIRUBIN TOTAL mg/dL 0.5               No results found for: \"BNP\"    Lab Results   Component Value Date    GLUCOSEU Normal 05/30/2025    BLOODU 0.1 (1+) (A) 05/30/2025    PROTUR 30 (1+) (A) 05/30/2025    NITRITEU NEGATIVE 05/30/2025    LEUKOCYTESU 500 Theresa/uL (A) 05/30/2025    WBCU >50 (A) 05/30/2025    RBCU >20 (A) 05/30/2025         Imaging:  No MRI brain results found for the past 14 days   No CT head results found for the past 14 days    Assessment/Recommendations  Elana Cox is a 60 y.o. 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 Neurology consulted for concerns of AMS in setting of acute-on-chronic language difficulties/difficulty following commands. Neurological exam is remarkable for expressive and receptive aphasia and paratonia without rigidity or other motor deficits. She is awake and per chart review at baseline. Suspect deficits are likely chronic post surgical changes. Her original encephalopathy was likely related to benzodiazepine sedation effect now resolved.      - c/w Lamotrigine 200 mg BID  - c/w Keppra 500 mg BID "   - Outpatient neurology follow up - Epilepsy    No further neurological workup required, general neurology will sign off.       Dylan Dunn MD  Neurology, PGY-2    I personally saw, examined, and discussed the patient above. I have reviewed and agree with the note above. All edits or corrections have been made directly into the note, including the physical exam and assessment/plan. Patient was seen, examined, and discussed with the attending.    Sandy Phan MD  Department of Neurology, PGY-4  General [id number]                      [1]   Allergies  Allergen Reactions    Penicillins Anaphylaxis     Other reaction(s): Anaphylactic Shock    Sulfamethoxazole-Trimethoprim Unknown   [2]   Family History  Problem Relation Name Age of Onset    Diabetes Mother      Heart failure Father      Diabetes Father      Skin cancer Father     [3]   Social History  Tobacco Use    Smoking status: Former     Current packs/day: 0.00     Types: Cigarettes     Quit date:      Years since quittin.5     Passive exposure: Past    Smokeless tobacco: Never   Vaping Use    Vaping status: Never Used   Substance Use Topics    Alcohol use: Not Currently    Drug use: Never

## 2025-07-28 NOTE — PROGRESS NOTES
"Patient's daughter called into the emergency line reporting \"strange behavior\" in her mother Elana. Patient has a Hx of dementia. She has been taking Zoloft 150 mg for anxiety but recently (Friday) started taking Ativan 1mg in the morning and 2mg in the evening.   Since starting Ativan, Patient has been crying, not responding, defecating on self and not eating.   Daughter was advised to hold the Ativan and bring the patient into the ER. Patient's doctor, Dr. Laughlin was informed.  "

## 2025-07-29 LAB — HOLD SPECIMEN: NORMAL

## 2025-07-30 ENCOUNTER — APPOINTMENT (OUTPATIENT)
Dept: BEHAVIORAL HEALTH | Facility: CLINIC | Age: 60
End: 2025-07-30
Payer: MEDICARE

## 2025-07-30 VITALS
SYSTOLIC BLOOD PRESSURE: 124 MMHG | WEIGHT: 205 LBS | TEMPERATURE: 97.2 F | DIASTOLIC BLOOD PRESSURE: 78 MMHG | HEIGHT: 66 IN | BODY MASS INDEX: 32.95 KG/M2 | HEART RATE: 80 BPM

## 2025-07-30 DIAGNOSIS — R41.9: Primary | ICD-10-CM

## 2025-07-30 DIAGNOSIS — F33.1 MODERATE EPISODE OF RECURRENT MAJOR DEPRESSIVE DISORDER: ICD-10-CM

## 2025-07-30 DIAGNOSIS — R41.89 COGNITIVE IMPAIRMENT: ICD-10-CM

## 2025-07-30 DIAGNOSIS — B00.1 COLD SORE: ICD-10-CM

## 2025-07-30 PROCEDURE — 3008F BODY MASS INDEX DOCD: CPT | Performed by: STUDENT IN AN ORGANIZED HEALTH CARE EDUCATION/TRAINING PROGRAM

## 2025-07-30 PROCEDURE — 99214 OFFICE O/P EST MOD 30 MIN: CPT | Performed by: STUDENT IN AN ORGANIZED HEALTH CARE EDUCATION/TRAINING PROGRAM

## 2025-07-30 RX ORDER — VALACYCLOVIR HYDROCHLORIDE 1 G/1
1000 TABLET, FILM COATED ORAL DAILY
Qty: 90 TABLET | Refills: 1 | Status: SHIPPED | OUTPATIENT
Start: 2025-07-30

## 2025-07-30 RX ORDER — VALACYCLOVIR HYDROCHLORIDE 1 G/1
1 TABLET, FILM COATED ORAL
COMMUNITY
Start: 2025-05-31

## 2025-07-30 RX ORDER — SERTRALINE HYDROCHLORIDE 100 MG/1
200 TABLET, FILM COATED ORAL DAILY
Qty: 180 TABLET | Refills: 3 | Status: SHIPPED | OUTPATIENT
Start: 2025-07-30 | End: 2026-07-25

## 2025-07-30 NOTE — ASSESSMENT & PLAN NOTE
- Check for treatable causes of dementia (B12, folate, syphilis, HIV; TSH WNL in 2/2025)  - Will discuss next steps in work-up with neuropsych colleagues
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
## 2025-07-31 DIAGNOSIS — E78.5 HYPERLIPIDEMIA, UNSPECIFIED HYPERLIPIDEMIA TYPE: ICD-10-CM

## 2025-07-31 RX ORDER — ATORVASTATIN CALCIUM 20 MG/1
20 TABLET, FILM COATED ORAL DAILY
Qty: 90 TABLET | Refills: 0 | Status: SHIPPED | OUTPATIENT
Start: 2025-07-31

## 2025-08-10 DIAGNOSIS — F33.1 MODERATE EPISODE OF RECURRENT MAJOR DEPRESSIVE DISORDER: ICD-10-CM

## 2025-08-11 RX ORDER — SERTRALINE HYDROCHLORIDE 100 MG/1
100 TABLET, FILM COATED ORAL DAILY
Qty: 90 TABLET | Refills: 1 | Status: SHIPPED | OUTPATIENT
Start: 2025-08-11

## 2025-08-29 ENCOUNTER — APPOINTMENT (OUTPATIENT)
Dept: BEHAVIORAL HEALTH | Facility: CLINIC | Age: 60
End: 2025-08-29
Payer: MEDICARE

## 2025-09-04 DIAGNOSIS — Z00.00 WELL ADULT EXAM: ICD-10-CM

## 2025-09-10 ENCOUNTER — APPOINTMENT (OUTPATIENT)
Dept: NEUROLOGY | Facility: HOSPITAL | Age: 60
End: 2025-09-10
Payer: MEDICARE

## 2025-09-26 ENCOUNTER — APPOINTMENT (OUTPATIENT)
Dept: BEHAVIORAL HEALTH | Facility: CLINIC | Age: 60
End: 2025-09-26
Payer: MEDICARE

## 2025-10-03 ENCOUNTER — APPOINTMENT (OUTPATIENT)
Dept: DERMATOLOGY | Facility: CLINIC | Age: 60
End: 2025-10-03
Payer: MEDICARE

## (undated) DEVICE — BANDAGE, COFLEX, 4 X 5 YDS, TAN, STERILE, LF

## (undated) DEVICE — DRAPE, SHEET, LAPAROTOMY, W/ISO-BAC, W/ARMBOARD COVERS, 98 X 122 IN, DISPOSABLE, LF, STERILE

## (undated) DEVICE — SUTURE, ETHILON, 2-0, FSLX 30, BLACK

## (undated) DEVICE — APPLICATOR, CHLORAPREP, W/ORANGE TINT, 26ML

## (undated) DEVICE — COVER, TRANSDUCER, NEO GUARD, 4 X 30CM, LF

## (undated) DEVICE — NEEDLE, SAFETY, 25 GA X 1.5 IN

## (undated) DEVICE — ELECTRODE, ELECTROSURGICAL, BLADE, INSULATED, ENT/IMA, STERILE

## (undated) DEVICE — Device

## (undated) DEVICE — DRAPE, INCISE, ANTIMICROBIAL, IOBAN 2, LARGE, 17 X 23 IN, DISPOSABLE, STERILE

## (undated) DEVICE — CUP, MEDICINE, GRADUATED, 2 OZ, PLASTIC, DISP, LF

## (undated) DEVICE — APPLIER,  LIGACLIP MULTI CLIP, 30 MED 11 1/2

## (undated) DEVICE — ADHESIVE, SKIN, DERMABOND ADVANCED, 15CM, PEN-STYLE

## (undated) DEVICE — MARKER, SKIN, W/ RULER, LF, STERILE

## (undated) DEVICE — SUTURE, VICRYL PLUS 3-0, SH, 27IN

## (undated) DEVICE — STRAP, VELCRO, BODY, 4 X 60IN, NS

## (undated) DEVICE — GLOVE, SURGICAL, PROTEXIS PI BLUE W/NEUTHERA, 8.0, PF, LF

## (undated) DEVICE — TOWEL PACK, STERILE, 16X24, XRAY DETECTABLE, BLUE, 4/PK

## (undated) DEVICE — STRAP, ARM BOARD, 32 X 1.5

## (undated) DEVICE — SYRINGE, 10 CC, LUER LOCK

## (undated) DEVICE — CAUTERY, PENCIL, PUSH BUTTON, SMOKE EVAC, 70MM

## (undated) DEVICE — SUTURE, VICRYL, 2-0, 27 IN, X-1, UNDYED

## (undated) DEVICE — GLOVE, SURGICAL, PROTEXIS PI , 7.5, PF, LF

## (undated) DEVICE — SUTURE, MONOCRYL, 4-0, 27 IN, PS-2, UNDYED

## (undated) DEVICE — DRESSING, NON-ADHERENT, TELFA, OUCHLESS, 3 X 8 IN, STERILE